# Patient Record
Sex: MALE | Race: BLACK OR AFRICAN AMERICAN | NOT HISPANIC OR LATINO | Employment: OTHER | URBAN - METROPOLITAN AREA
[De-identification: names, ages, dates, MRNs, and addresses within clinical notes are randomized per-mention and may not be internally consistent; named-entity substitution may affect disease eponyms.]

---

## 2019-07-26 ENCOUNTER — HOSPITAL ENCOUNTER (INPATIENT)
Facility: HOSPITAL | Age: 54
LOS: 5 days | Discharge: HOME | DRG: 392 | End: 2019-07-31
Attending: EMERGENCY MEDICINE | Admitting: EMERGENCY MEDICINE

## 2019-07-26 DIAGNOSIS — R60.0 BILATERAL LOWER EXTREMITY EDEMA: ICD-10-CM

## 2019-07-26 DIAGNOSIS — I87.1 VENOUS OBSTRUCTION: ICD-10-CM

## 2019-07-26 DIAGNOSIS — R19.07 GENERALIZED ABDOMINAL MASS: Primary | ICD-10-CM

## 2019-07-26 DIAGNOSIS — R63.5 WEIGHT GAIN: ICD-10-CM

## 2019-07-26 DIAGNOSIS — M79.89 LOCALIZED SWELLING OF LOWER EXTREMITY: ICD-10-CM

## 2019-07-26 DIAGNOSIS — R07.9 CHEST PAIN: ICD-10-CM

## 2019-07-26 DIAGNOSIS — N50.89 SCROTAL EDEMA: ICD-10-CM

## 2019-07-26 DIAGNOSIS — N50.89 SCROTAL SWELLING: ICD-10-CM

## 2019-07-26 LAB
ABO + RH BLD: NORMAL
ALBUMIN SERPL BCP-MCNC: 1.9 G/DL (ref 3.5–5.2)
ALP SERPL-CCNC: 918 U/L (ref 55–135)
ALT SERPL W/O P-5'-P-CCNC: 159 U/L (ref 10–44)
ANION GAP SERPL CALC-SCNC: 9 MMOL/L (ref 8–16)
AST SERPL-CCNC: 180 U/L (ref 10–40)
BASOPHILS # BLD AUTO: 0.02 K/UL (ref 0–0.2)
BASOPHILS NFR BLD: 0.2 % (ref 0–1.9)
BILIRUB SERPL-MCNC: 2.9 MG/DL (ref 0.1–1)
BLD GP AB SCN CELLS X3 SERPL QL: NORMAL
BUN SERPL-MCNC: 9 MG/DL (ref 6–20)
CALCIUM SERPL-MCNC: 8.1 MG/DL (ref 8.7–10.5)
CHLORIDE SERPL-SCNC: 104 MMOL/L (ref 95–110)
CO2 SERPL-SCNC: 28 MMOL/L (ref 23–29)
CREAT SERPL-MCNC: 0.7 MG/DL (ref 0.5–1.4)
DIFFERENTIAL METHOD: ABNORMAL
EOSINOPHIL # BLD AUTO: 0.1 K/UL (ref 0–0.5)
EOSINOPHIL NFR BLD: 1.1 % (ref 0–8)
ERYTHROCYTE [DISTWIDTH] IN BLOOD BY AUTOMATED COUNT: 19.3 % (ref 11.5–14.5)
EST. GFR  (AFRICAN AMERICAN): >60 ML/MIN/1.73 M^2
EST. GFR  (NON AFRICAN AMERICAN): >60 ML/MIN/1.73 M^2
ESTIMATED AVG GLUCOSE: 94 MG/DL (ref 68–131)
GLUCOSE SERPL-MCNC: 77 MG/DL (ref 70–110)
HBA1C MFR BLD HPLC: 4.9 % (ref 4–5.6)
HCT VFR BLD AUTO: 33.6 % (ref 40–54)
HGB BLD-MCNC: 10.8 G/DL (ref 14–18)
IMM GRANULOCYTES # BLD AUTO: 0.03 K/UL (ref 0–0.04)
IMM GRANULOCYTES NFR BLD AUTO: 0.4 % (ref 0–0.5)
INR PPP: 1 (ref 0.8–1.2)
LIPASE SERPL-CCNC: 74 U/L (ref 4–60)
LYMPHOCYTES # BLD AUTO: 1.4 K/UL (ref 1–4.8)
LYMPHOCYTES NFR BLD: 17.1 % (ref 18–48)
MAGNESIUM SERPL-MCNC: 1.9 MG/DL (ref 1.6–2.6)
MCH RBC QN AUTO: 26.9 PG (ref 27–31)
MCHC RBC AUTO-ENTMCNC: 32.1 G/DL (ref 32–36)
MCV RBC AUTO: 84 FL (ref 82–98)
MONOCYTES # BLD AUTO: 1 K/UL (ref 0.3–1)
MONOCYTES NFR BLD: 11.7 % (ref 4–15)
NEUTROPHILS # BLD AUTO: 5.8 K/UL (ref 1.8–7.7)
NEUTROPHILS NFR BLD: 69.5 % (ref 38–73)
NRBC BLD-RTO: 0 /100 WBC
PLATELET # BLD AUTO: 523 K/UL (ref 150–350)
PMV BLD AUTO: 9.8 FL (ref 9.2–12.9)
POTASSIUM SERPL-SCNC: 3.3 MMOL/L (ref 3.5–5.1)
PROT SERPL-MCNC: 6.1 G/DL (ref 6–8.4)
PROTHROMBIN TIME: 10.6 SEC (ref 9–12.5)
RBC # BLD AUTO: 4.01 M/UL (ref 4.6–6.2)
SODIUM SERPL-SCNC: 141 MMOL/L (ref 136–145)
TROPONIN I SERPL DL<=0.01 NG/ML-MCNC: <0.006 NG/ML (ref 0–0.03)
WBC # BLD AUTO: 8.38 K/UL (ref 3.9–12.7)

## 2019-07-26 PROCEDURE — 83735 ASSAY OF MAGNESIUM: CPT

## 2019-07-26 PROCEDURE — 85610 PROTHROMBIN TIME: CPT

## 2019-07-26 PROCEDURE — 99285 EMERGENCY DEPT VISIT HI MDM: CPT | Mod: 25

## 2019-07-26 PROCEDURE — 99223 PR INITIAL HOSPITAL CARE,LEVL III: ICD-10-PCS | Mod: ,,, | Performed by: HOSPITALIST

## 2019-07-26 PROCEDURE — 83036 HEMOGLOBIN GLYCOSYLATED A1C: CPT

## 2019-07-26 PROCEDURE — 84484 ASSAY OF TROPONIN QUANT: CPT

## 2019-07-26 PROCEDURE — 80053 COMPREHEN METABOLIC PANEL: CPT

## 2019-07-26 PROCEDURE — 93010 ELECTROCARDIOGRAM REPORT: CPT | Mod: ,,, | Performed by: INTERNAL MEDICINE

## 2019-07-26 PROCEDURE — 11000001 HC ACUTE MED/SURG PRIVATE ROOM

## 2019-07-26 PROCEDURE — 99285 EMERGENCY DEPT VISIT HI MDM: CPT | Mod: ,,, | Performed by: EMERGENCY MEDICINE

## 2019-07-26 PROCEDURE — 25000003 PHARM REV CODE 250: Performed by: STUDENT IN AN ORGANIZED HEALTH CARE EDUCATION/TRAINING PROGRAM

## 2019-07-26 PROCEDURE — 86901 BLOOD TYPING SEROLOGIC RH(D): CPT

## 2019-07-26 PROCEDURE — 99223 1ST HOSP IP/OBS HIGH 75: CPT | Mod: ,,, | Performed by: HOSPITALIST

## 2019-07-26 PROCEDURE — 93005 ELECTROCARDIOGRAM TRACING: CPT

## 2019-07-26 PROCEDURE — 99285 PR EMERGENCY DEPT VISIT,LEVEL V: ICD-10-PCS | Mod: ,,, | Performed by: EMERGENCY MEDICINE

## 2019-07-26 PROCEDURE — 85025 COMPLETE CBC W/AUTO DIFF WBC: CPT

## 2019-07-26 PROCEDURE — 93010 EKG 12-LEAD: ICD-10-PCS | Mod: ,,, | Performed by: INTERNAL MEDICINE

## 2019-07-26 PROCEDURE — 80074 ACUTE HEPATITIS PANEL: CPT

## 2019-07-26 PROCEDURE — 83690 ASSAY OF LIPASE: CPT

## 2019-07-26 PROCEDURE — 94761 N-INVAS EAR/PLS OXIMETRY MLT: CPT

## 2019-07-26 RX ORDER — IBUPROFEN 200 MG
16 TABLET ORAL
Status: DISCONTINUED | OUTPATIENT
Start: 2019-07-26 | End: 2019-07-31 | Stop reason: HOSPADM

## 2019-07-26 RX ORDER — GLUCAGON 1 MG
1 KIT INJECTION
Status: DISCONTINUED | OUTPATIENT
Start: 2019-07-26 | End: 2019-07-31 | Stop reason: HOSPADM

## 2019-07-26 RX ORDER — IBUPROFEN 200 MG
24 TABLET ORAL
Status: DISCONTINUED | OUTPATIENT
Start: 2019-07-26 | End: 2019-07-31 | Stop reason: HOSPADM

## 2019-07-26 RX ORDER — POLYETHYLENE GLYCOL 3350 17 G/17G
17 POWDER, FOR SOLUTION ORAL 2 TIMES DAILY PRN
Status: DISCONTINUED | OUTPATIENT
Start: 2019-07-26 | End: 2019-07-31 | Stop reason: HOSPADM

## 2019-07-26 RX ORDER — POTASSIUM CHLORIDE 20 MEQ/1
40 TABLET, EXTENDED RELEASE ORAL ONCE
Status: COMPLETED | OUTPATIENT
Start: 2019-07-26 | End: 2019-07-26

## 2019-07-26 RX ORDER — SODIUM CHLORIDE 0.9 % (FLUSH) 0.9 %
10 SYRINGE (ML) INJECTION
Status: DISCONTINUED | OUTPATIENT
Start: 2019-07-26 | End: 2019-07-30

## 2019-07-26 RX ORDER — ACETAMINOPHEN 325 MG/1
650 TABLET ORAL EVERY 4 HOURS PRN
Status: DISCONTINUED | OUTPATIENT
Start: 2019-07-26 | End: 2019-07-31 | Stop reason: HOSPADM

## 2019-07-26 RX ORDER — ACETAMINOPHEN 500 MG
1000 TABLET ORAL EVERY 8 HOURS PRN
Status: DISCONTINUED | OUTPATIENT
Start: 2019-07-26 | End: 2019-07-31 | Stop reason: HOSPADM

## 2019-07-26 RX ORDER — PROMETHAZINE HYDROCHLORIDE 25 MG/1
25 TABLET ORAL EVERY 6 HOURS PRN
Status: DISCONTINUED | OUTPATIENT
Start: 2019-07-26 | End: 2019-07-31 | Stop reason: HOSPADM

## 2019-07-26 RX ADMIN — POTASSIUM CHLORIDE 40 MEQ: 20 TABLET, EXTENDED RELEASE ORAL at 08:07

## 2019-07-26 NOTE — ED PROVIDER NOTES
Encounter Date: 7/26/2019       History     Chief Complaint   Patient presents with    Abnormal Ct Scan    Groin Swelling     size of grapefruit     This is a 52 y/o male who presents to ED with abdominal distension. Patient does not provide a good history as to how long this has been going on, but it sounds like at least several years. He presented to [facility] ED on 7/15/19 and underwent CT scan without contrast showing 26 x 25 x 20 cm intraabdominal cyst of unclear origin. He was given outpatient follow-up with GENERAL SURGERY, but has not seen them yet. Family was concerned because he has been having difficulty ambulating today due to his enlarged scrotum so they brought him to ED. His only complaint is chest discomfort that is worse when laying flat and improved when sitting up. He has significant leg edema - patient and family are unsure how long this has been going on. He denies N/V, abdominal pain. Having normal BMs - no hematochezia/melena. No scleral icterus/jaundice. Was having some urinary incontinence today - no hematuria/dysuria.    Denies any medical problems or previous surgeries. Does not regularly see a PCP.    He works as a  and has been able to work without issue. No smoking, quit ETOH 6 years ago.        Review of patient's allergies indicates:  No Known Allergies  No past medical history on file.  No past surgical history on file.  No family history on file.  Social History     Tobacco Use    Smoking status: Never Smoker    Smokeless tobacco: Never Used   Substance Use Topics    Alcohol use: Not Currently     Comment: quick drinking 6 years ago    Drug use: Never     Review of Systems   Constitutional: Positive for unexpected weight change (weight gain - unsure amt). Negative for chills, fatigue and fever.   HENT: Negative for trouble swallowing.    Respiratory: Negative for cough, shortness of breath and wheezing.    Cardiovascular: Positive for chest pain and leg swelling.  Negative for palpitations.   Gastrointestinal: Positive for abdominal distention. Negative for abdominal pain, constipation, diarrhea, nausea and vomiting.   Genitourinary: Negative for difficulty urinating, dysuria and hematuria.   Musculoskeletal: Negative for back pain and gait problem.   Skin: Negative for rash and wound.   Allergic/Immunologic: Negative for environmental allergies, food allergies and immunocompromised state.   Neurological: Negative for seizures and syncope.       Physical Exam     Initial Vitals [07/26/19 1640]   BP Pulse Resp Temp SpO2   110/70 95 18 98.5 °F (36.9 °C) 100 %      MAP       --         Physical Exam    Constitutional: He appears well-developed. No distress.   Appears thin   HENT:   Head: Normocephalic and atraumatic.   Mouth/Throat: Oropharynx is clear and moist.   Neck: No JVD present.   Cardiovascular: Normal rate and regular rhythm. Exam reveals no gallop and no friction rub.    No murmur heard.  Pulmonary/Chest: Breath sounds normal. No respiratory distress. He has no wheezes. He has no rhonchi. He has no rales.   Abdominal: Bowel sounds are normal. He exhibits distension and mass. There is no tenderness. There is no rebound.   Large firm mass occupying R abdomen, dull to percussion   Genitourinary:   Genitourinary Comments: Significant penile/scrotal edema with skin tearing   Musculoskeletal:   Significant bilateral lower extremity edema - entire legs with some skin tearing  Palpable B femoral pulses  Able to move toes   Skin: Skin is warm and dry.         ED Course   Procedures  Labs Reviewed   CBC W/ AUTO DIFFERENTIAL - Abnormal; Notable for the following components:       Result Value    RBC 4.01 (*)     Hemoglobin 10.8 (*)     Hematocrit 33.6 (*)     Mean Corpuscular Hemoglobin 26.9 (*)     RDW 19.3 (*)     Platelets 523 (*)     Lymph% 17.1 (*)     All other components within normal limits   COMPREHENSIVE METABOLIC PANEL - Abnormal; Notable for the following components:     Potassium 3.3 (*)     Calcium 8.1 (*)     Albumin 1.9 (*)     Total Bilirubin 2.9 (*)     Alkaline Phosphatase 918 (*)      (*)      (*)     All other components within normal limits   MAGNESIUM   PROTIME-INR   TROPONIN I   URINALYSIS, REFLEX TO URINE CULTURE   TYPE & SCREEN          Imaging Results          X-Ray Chest PA And Lateral (Final result)  Result time 07/26/19 17:51:24    Final result by Brandie Jackson MD (07/26/19 17:51:24)                 Impression:      Limited exam.  Lungs appear grossly clear.      Electronically signed by: Brandie Jackson MD  Date:    07/26/2019  Time:    17:51             Narrative:    EXAMINATION:  XR CHEST PA AND LATERAL    TECHNIQUE:  PA and lateral views of the chest were performed.    COMPARISON:  Prior dated 07/15/2019    FINDINGS:  The patient is rotated and partially recumbent, limiting evaluation.  The cardiac silhouette is not enlarged.  There is no evidence of acute pulmonary disease, pleural disease, lymph node enlargement, or cardiac decompensation.  No osseous abnormalities are identified.                                 Medical Decision Making:   History:   Old Medical Records: I decided to obtain old medical records.  Old Records Summarized: records from another hospital.  Initial Assessment:   54 y/o male with no known medical problems who presents to ED with several years of worsening abdominal distension and more recently leg/scrotal edema. Seen at [facility] ED 7/15/19 and had CT scan without contrast showing 26 x 25 x 20 cm intraabdominal cyst of unclear origin. Instructed to follow-up with GENERAL SURGERY. Brought in today for difficulty ambulating 2/2 scrotal edema. Some chest discomfort likely 2/2 pressure from abdominal mass.    On exam, vitals normal. Abdomen massively distended without TTP. Significant scrotal/penile and BLE edema with skin tearing.    Will check labs (CBC, CMP, PT/INR). EKG/troponin to rule out ACS.  Independently  Interpreted Test(s):   I have ordered and independently interpreted EKG Reading(s) - see prior notes       <> Summary of EKG Reading(s): NSR, no ST elevation  Clinical Tests:   Lab Tests: Ordered and Reviewed  The following lab test(s) were unremarkable: CBC, CMP, Troponin and PT  Radiological Study: Ordered and Reviewed  ED Management:  6:50 PM  Patient with large intraabdominal simple cyst of unclear origin (possibly hepatic) with resultant biliary and caval compression. Discussed with GENERAL SURGERY - no acute plans for surgical intervention. Will admit to MEDICINE for possible IR drainage of cyst.  Other:   I have discussed this case with another health care provider.                      Clinical Impression:       ICD-10-CM ICD-9-CM   1. Generalized abdominal mass R19.07 789.37   2. Chest pain R07.9 786.50   3. Localized swelling of lower extremity M79.89 729.81   4. Scrotal swelling N50.89 608.86                                Pratik Ennis MD  Resident  07/26/19 1852       Pratik Ennis MD  Resident  07/26/19 2713

## 2019-07-26 NOTE — ED NOTES
LOC: The patient is awake, alert and aware of environment with an appropriate affect, the patient is oriented x 3 and speaking appropriately.  SKIN: The skin is warm and dry, color consistent with ethnicity with jaundice noted to scelra of eyes  MUSCULOSKELETAL: Patient moving all extremities spontaneously,moderate swelling noted to bilateral lower extremities  RESPIRATORY: Airway is open and patent, respirations are spontaneous, patient has a normal effort and rate, no accessory muscle use noted  ABDOMEN: hard and tender to palpation, moderate distention noted, and moderate swelling noted to groin and penis area  NEUROLOGIC:  facial expression is symmetrical, patient moving all extremities spontaneously, normal sensation in all extremities when touched with a finger.  Follows all commands appropriately.    Patient here for eval after noting that patient has a large mass in his abd, patient states he has noticed over the years that his abd has been growing, patient stopped drinking alcohol about 6 years ago, patient has moderate swelling to bilateral lower extremities, patient is jaundice in the scelra of his eyes, patients has severe swelling to groin and penis, patient has also beginning to urinate on self over the last two days, patient complains of sob when laying flat, patient states he has to lay on his side to sleep he is unable to lay due to pressure in his chest area, vss, cardiac monitoring in progress, will continue to monitor

## 2019-07-26 NOTE — EKG INTERPRETATIONS - EMERGENCY DEPT.
Independently interpreted by MD:  Rate 94, NSR, no stemi, no ectopy, flat T waves in V5-6, long QT

## 2019-07-27 LAB
ALBUMIN SERPL BCP-MCNC: 1.7 G/DL (ref 3.5–5.2)
ALP SERPL-CCNC: 863 U/L (ref 55–135)
ALT SERPL W/O P-5'-P-CCNC: 144 U/L (ref 10–44)
ANION GAP SERPL CALC-SCNC: 5 MMOL/L (ref 8–16)
AST SERPL-CCNC: 156 U/L (ref 10–40)
BASOPHILS # BLD AUTO: 0.04 K/UL (ref 0–0.2)
BASOPHILS NFR BLD: 0.4 % (ref 0–1.9)
BILIRUB SERPL-MCNC: 3.2 MG/DL (ref 0.1–1)
BILIRUB UR QL STRIP: ABNORMAL
BUN SERPL-MCNC: 10 MG/DL (ref 6–20)
CALCIUM SERPL-MCNC: 8.2 MG/DL (ref 8.7–10.5)
CANCER AG125 SERPL-ACNC: 57 U/ML (ref 0–30)
CANCER AG19-9 SERPL-ACNC: <2 U/ML (ref 2–40)
CEA SERPL-MCNC: 6.5 NG/ML (ref 0–5)
CHLORIDE SERPL-SCNC: 103 MMOL/L (ref 95–110)
CLARITY UR REFRACT.AUTO: CLEAR
CO2 SERPL-SCNC: 27 MMOL/L (ref 23–29)
COLOR UR AUTO: ABNORMAL
CREAT SERPL-MCNC: 0.6 MG/DL (ref 0.5–1.4)
DIFFERENTIAL METHOD: ABNORMAL
EOSINOPHIL # BLD AUTO: 0.1 K/UL (ref 0–0.5)
EOSINOPHIL NFR BLD: 1.2 % (ref 0–8)
ERYTHROCYTE [DISTWIDTH] IN BLOOD BY AUTOMATED COUNT: 19.7 % (ref 11.5–14.5)
EST. GFR  (AFRICAN AMERICAN): >60 ML/MIN/1.73 M^2
EST. GFR  (NON AFRICAN AMERICAN): >60 ML/MIN/1.73 M^2
GLUCOSE SERPL-MCNC: 97 MG/DL (ref 70–110)
GLUCOSE UR QL STRIP: NEGATIVE
HCT VFR BLD AUTO: 32 % (ref 40–54)
HGB BLD-MCNC: 10.4 G/DL (ref 14–18)
HGB UR QL STRIP: NEGATIVE
IMM GRANULOCYTES # BLD AUTO: 0.04 K/UL (ref 0–0.04)
IMM GRANULOCYTES NFR BLD AUTO: 0.4 % (ref 0–0.5)
KETONES UR QL STRIP: NEGATIVE
LEUKOCYTE ESTERASE UR QL STRIP: NEGATIVE
LYMPHOCYTES # BLD AUTO: 1.6 K/UL (ref 1–4.8)
LYMPHOCYTES NFR BLD: 16.9 % (ref 18–48)
MAGNESIUM SERPL-MCNC: 1.8 MG/DL (ref 1.6–2.6)
MCH RBC QN AUTO: 26.7 PG (ref 27–31)
MCHC RBC AUTO-ENTMCNC: 32.5 G/DL (ref 32–36)
MCV RBC AUTO: 82 FL (ref 82–98)
MONOCYTES # BLD AUTO: 0.9 K/UL (ref 0.3–1)
MONOCYTES NFR BLD: 9.4 % (ref 4–15)
NEUTROPHILS # BLD AUTO: 6.8 K/UL (ref 1.8–7.7)
NEUTROPHILS NFR BLD: 71.7 % (ref 38–73)
NITRITE UR QL STRIP: NEGATIVE
NRBC BLD-RTO: 0 /100 WBC
PH UR STRIP: 6 [PH] (ref 5–8)
PHOSPHATE SERPL-MCNC: 2.4 MG/DL (ref 2.7–4.5)
PLATELET # BLD AUTO: 477 K/UL (ref 150–350)
PMV BLD AUTO: 10.2 FL (ref 9.2–12.9)
POTASSIUM SERPL-SCNC: 4 MMOL/L (ref 3.5–5.1)
PROT SERPL-MCNC: 5.8 G/DL (ref 6–8.4)
PROT UR QL STRIP: NEGATIVE
RBC # BLD AUTO: 3.9 M/UL (ref 4.6–6.2)
SODIUM SERPL-SCNC: 135 MMOL/L (ref 136–145)
SP GR UR STRIP: >=1.03 (ref 1–1.03)
URN SPEC COLLECT METH UR: ABNORMAL
WBC # BLD AUTO: 9.43 K/UL (ref 3.9–12.7)

## 2019-07-27 PROCEDURE — 25000003 PHARM REV CODE 250: Performed by: STUDENT IN AN ORGANIZED HEALTH CARE EDUCATION/TRAINING PROGRAM

## 2019-07-27 PROCEDURE — 86301 IMMUNOASSAY TUMOR CA 19-9: CPT

## 2019-07-27 PROCEDURE — 83735 ASSAY OF MAGNESIUM: CPT

## 2019-07-27 PROCEDURE — 81003 URINALYSIS AUTO W/O SCOPE: CPT

## 2019-07-27 PROCEDURE — 99233 PR SUBSEQUENT HOSPITAL CARE,LEVL III: ICD-10-PCS | Mod: ,,, | Performed by: HOSPITALIST

## 2019-07-27 PROCEDURE — 85025 COMPLETE CBC W/AUTO DIFF WBC: CPT

## 2019-07-27 PROCEDURE — 86304 IMMUNOASSAY TUMOR CA 125: CPT

## 2019-07-27 PROCEDURE — 11000001 HC ACUTE MED/SURG PRIVATE ROOM

## 2019-07-27 PROCEDURE — 99233 SBSQ HOSP IP/OBS HIGH 50: CPT | Mod: ,,, | Performed by: HOSPITALIST

## 2019-07-27 PROCEDURE — 80053 COMPREHEN METABOLIC PANEL: CPT

## 2019-07-27 PROCEDURE — 82378 CARCINOEMBRYONIC ANTIGEN: CPT

## 2019-07-27 PROCEDURE — 86682 HELMINTH ANTIBODY: CPT

## 2019-07-27 PROCEDURE — 36415 COLL VENOUS BLD VENIPUNCTURE: CPT

## 2019-07-27 PROCEDURE — 84100 ASSAY OF PHOSPHORUS: CPT

## 2019-07-27 RX ORDER — SODIUM CHLORIDE 0.9 % (FLUSH) 0.9 %
10 SYRINGE (ML) INJECTION
Status: DISCONTINUED | OUTPATIENT
Start: 2019-07-27 | End: 2019-07-31 | Stop reason: HOSPADM

## 2019-07-27 RX ADMIN — ACETAMINOPHEN 1000 MG: 500 TABLET ORAL at 12:07

## 2019-07-27 NOTE — ASSESSMENT & PLAN NOTE
-CT ABD/Pelvis showing a large 26 x 25 x 20 cm simple appearing cystic mass which cannot be clearly defined as extrahepatic, compressing common bile duct in displacing all intra-abdominal organs without evidence of GI or  obstruction    -Consult to IR for drainage, med management per medicine team  -NPO Midnight  - and CEA  -Lipase  -Acute Hepatitis Panel  -stool analysis for parasites, ova, cysts  -stool WBC  -FOBT

## 2019-07-27 NOTE — SUBJECTIVE & OBJECTIVE
Review of Systems   Constitutional: Negative for chills and fever.   HENT: Negative for trouble swallowing.    Eyes: Negative for visual disturbance.   Respiratory: Negative for shortness of breath.    Cardiovascular: Positive for chest pain and leg swelling. Negative for palpitations.   Gastrointestinal: Positive for abdominal distention and abdominal pain. Negative for blood in stool, constipation, diarrhea, nausea and vomiting.   Genitourinary: Positive for penile swelling and scrotal swelling.        Urinary incontinence   Musculoskeletal: Negative for back pain.        Bilateral LE swelling   Skin: Negative for rash.   Neurological: Negative for dizziness and light-headedness.   Psychiatric/Behavioral: Negative for confusion.     Objective:     Vital Signs (Most Recent):  Temp: 97.8 °F (36.6 °C) (07/27/19 1119)  Pulse: 62 (07/27/19 0808)  Resp: 13 (07/27/19 0808)  BP: 114/73 (07/27/19 0808)  SpO2: 95 % (07/27/19 0808) Vital Signs (24h Range):  Temp:  [97.4 °F (36.3 °C)-98.6 °F (37 °C)] 97.8 °F (36.6 °C)  Pulse:  [62-95] 62  Resp:  [13-21] 13  SpO2:  [94 %-100 %] 95 %  BP: (106-153)/(70-89) 114/73     Weight: 79.8 kg (175 lb 14.8 oz)  Body mass index is 29.28 kg/m².    Physical Exam   Constitutional: He is oriented to person, place, and time. He appears well-developed and well-nourished. No distress.   HENT:   Head: Normocephalic and atraumatic.   Eyes: EOM are normal. No scleral icterus.   Neck: Normal range of motion. No JVD present.   Cardiovascular: Normal rate and normal heart sounds.   Pulmonary/Chest: Effort normal and breath sounds normal. No respiratory distress.   Abdominal: Bowel sounds are normal. He exhibits distension and mass. There is tenderness. There is no rebound and no guarding.   Enlarged and distended abdomen.    Genitourinary:   Genitourinary Comments: Enlarged scrotum more on the L side than R   Musculoskeletal: Normal range of motion. He exhibits edema.   Bilateral lower extremity edema     Neurological: He is alert and oriented to person, place, and time.   Psychiatric: His behavior is normal.         CRANIAL NERVES     CN III, IV, VI   Extraocular motions are normal.        Significant Labs: All pertinent labs within the past 24 hours have been reviewed.    Significant Imaging: I have reviewed and interpreted all pertinent imaging results/findings within the past 24 hours.

## 2019-07-27 NOTE — ASSESSMENT & PLAN NOTE
-increased scrotal swelling over the last 2 weeks  -endorses urinary incontinence over this period as well  -UA pending

## 2019-07-27 NOTE — ASSESSMENT & PLAN NOTE
-CT ABD/Pelvis showing a large 26 x 25 x 20 cm simple appearing cystic mass which cannot be clearly defined as extrahepatic, compressing common bile duct in displacing all intra-abdominal organs without evidence of GI or  obstruction    -Consult to IR for drainage, med management per medicine team  -, CEA, and Lipase mildy elevated at 57, 6.5, and 74 respectively  -Acute Hepatitis Panel pending  -stool analysis for parasites, ova, cysts pending  -stool WBC pending  -FOBT pending  -Gen Surg consulted on 07/26 and recommended drainage by IR  -IR to drain on Monday, 07/29  - ordered

## 2019-07-27 NOTE — ASSESSMENT & PLAN NOTE
-weight gain over the last few years, unsure time-course or amount gained  -CT ABD with large addominal cystic mass

## 2019-07-27 NOTE — ASSESSMENT & PLAN NOTE
-progressive history of bilateral lower extremity swelling over the last few years with skin tearing present on exam  -likely 2/2 IVC compression via abdominal cystic mass

## 2019-07-27 NOTE — ASSESSMENT & PLAN NOTE
-progressive history of bilateral lower extremity swelling over the last few years with skin tearing present on exam  -likely 2/2 IVC compression via abdominal cystic mass  -IR to drain abdominal mass on Monday 07/29

## 2019-07-27 NOTE — H&P
[facility] Hospital Medicine  History & Physical    Patient Name: Kishan Sánchez  MRN: [id number]  Admission Date: 7/26/2019  Attending Physician: Janie Hampton MD   Primary Care Provider: Primary Doctor [facility] Medicine Team: [facility] 2 Hipolito Velazquez MD     Patient information was obtained from patient, relative(s) and ER records.     Subjective:     Principal Problem:Generalized abdominal mass    Chief Complaint:   Chief Complaint   Patient presents with    Abnormal Ct Scan    Groin Swelling     size of grapefruit        HPI: Patient is a 54 yo M with no PMH, has not seen a doctor in his life, presents with an increasing abdomen and scrotum. It is unknown exactly when this all started as the patient is a poor historian (most of the history was obtained via his sister and niece), however, it seems like this has been ongoing for the last few years. He has had progressive distension of his abdomen for the last few years, without any pain. Two weeks ago he started to have some chest pain so he decided to go to [facility], from which he was discharged with a follow up CT Abd appointment. Today he went to his CT Abd appointment, and when he got home, his niece noticed that he had an enlarged scrotum while he was using the bathroom, so insisted that he come to [facility] ED in [place]. He also has had urinary incontinence starting 2 weeks ago which has progressively gotten worse, now wearing diapers. Also, over these past few years of progressive abdominal distension, he has had progressive leg swelling bilaterally. He endorses weight gain but does not know how much. He denies fevers, chills, chest pain, SOB. He denies any changes in his bowel movements and states that his last one was today which was normal. He denies N/V.    Denies ever seeing a doctor. Denies any PMH and PSH.    He works as a  and lives with his sister. Denies tobacco and IV drug use as well as  drugs not prescribed to him. He denies any history of blood transfusions. Endorses remote history of drinking 2 beers daily, but stopped in 1989. His mother passed away from ovarian cancer and his father from lung cancer. Otherwise, family history is unknown.    CT ABD/Pelvis showing a large 26 x 25 x 20 cm simple appearing cystic mass which cannot be clearly defined as extrahepatic, compressing common bile duct in displacing all intra-abdominal organs without evidence of GI or  obstruction.    In the ED, VSS, WBC 8.4, Alk P 918, , , troponin 0.006.     No past medical history on file.    No past surgical history on file.    Review of patient's allergies indicates:  No Known Allergies    Current Facility-Administered Medications on File Prior to Encounter   Medication    [COMPLETED] iohexol (OMNIPAQUE 350) injection 80 mL    iohexol (OMNIPAQUE) oral solution 30 mL     No current outpatient medications on file prior to encounter.     Family History     None        Tobacco Use    Smoking status: Never Smoker    Smokeless tobacco: Never Used   Substance and Sexual Activity    Alcohol use: Not Currently     Comment: quick drinking 6 years ago    Drug use: Never    Sexual activity: Not Currently     Partners: Female     Review of Systems   Constitutional: Negative for chills and fever.   HENT: Negative for trouble swallowing.    Eyes: Negative for visual disturbance.   Respiratory: Negative for shortness of breath.    Cardiovascular: Positive for chest pain and leg swelling. Negative for palpitations.   Gastrointestinal: Positive for abdominal distention and abdominal pain. Negative for blood in stool, constipation, diarrhea, nausea and vomiting.   Genitourinary: Positive for penile swelling and scrotal swelling.        Urinary incontinence   Musculoskeletal: Negative for back pain.        Bilateral LE swelling   Skin: Negative for rash.   Neurological: Negative for dizziness and light-headedness.    Psychiatric/Behavioral: Negative for confusion.     Objective:     Vital Signs (Most Recent):  Temp: 98.5 °F (36.9 °C) (07/26/19 1640)  Pulse: 79 (07/26/19 1846)  Resp: 18 (07/26/19 1846)  BP: 111/79 (07/26/19 1846)  SpO2: 100 % (07/26/19 1846) Vital Signs (24h Range):  Temp:  [98.5 °F (36.9 °C)] 98.5 °F (36.9 °C)  Pulse:  [79-95] 79  Resp:  [18-20] 18  SpO2:  [100 %] 100 %  BP: (106-131)/(70-89) 111/79     Weight: 79.8 kg (176 lb)  Body mass index is 29.29 kg/m².    Physical Exam   Constitutional: He is oriented to person, place, and time. He appears well-developed and well-nourished. No distress.   HENT:   Head: Normocephalic and atraumatic.   Eyes: EOM are normal. No scleral icterus.   Neck: Normal range of motion. No JVD present.   Cardiovascular: Normal rate and normal heart sounds.   Pulmonary/Chest: Effort normal and breath sounds normal. No respiratory distress.   Abdominal: Bowel sounds are normal. He exhibits distension and mass. There is tenderness. There is no rebound and no guarding.   Enlarged and distended abdomen.    Genitourinary:   Genitourinary Comments: Enlarged scrotum more on the L side than R   Musculoskeletal: Normal range of motion. He exhibits edema.   Bilateral lower extremity edema    Neurological: He is alert and oriented to person, place, and time.   Psychiatric: His behavior is normal.         CRANIAL NERVES     CN III, IV, VI   Extraocular motions are normal.        Significant Labs: All pertinent labs within the past 24 hours have been reviewed.    Significant Imaging: I have reviewed and interpreted all pertinent imaging results/findings within the past 24 hours.    Assessment/Plan:     * Generalized abdominal mass  -CT ABD/Pelvis showing a large 26 x 25 x 20 cm simple appearing cystic mass which cannot be clearly defined as extrahepatic, compressing common bile duct in displacing all intra-abdominal organs without evidence of GI or  obstruction    -Consult to IR for drainage,  med management per medicine team  -NPO Midnight  - and CEA  -Lipase  -Acute Hepatitis Panel  -stool analysis for parasites, ova, cysts  -stool WBC  -FOBT          Scrotal edema  -increased scrotal swelling over the last 2 weeks  -endorses urinary incontinence over this period as well  -UA pending      Bilateral lower extremity edema  -progressive history of bilateral lower extremity swelling over the last few years with skin tearing present on exam  -likely 2/2 IVC compression via abdominal cystic mass      Weight gain  -weight gain over the last few years, unsure time-course or amount gained  -CT ABD with large addominal cystic mass        VTE Risk Mitigation (From admission, onward)        Ordered     Place sequential compression device  Until discontinued      07/26/19 1946     IP VTE LOW RISK PATIENT  Once      07/26/19 1946             Hipolito Velazquez MD  Department of Hospital Medicine   [facility]

## 2019-07-27 NOTE — CONSULTS
Radiology Consult    Kishan Sánchez is a 53 y.o. male with a large cystic fluid collection within the liver, for which IR is consulted to drain.     History reviewed. No pertinent past medical history.  History reviewed. No pertinent surgical history.    Discussed with primary team, Dr. Hampton     Imaging reviewed with Radiology staff, Dr. Guy.     Procedure: Hepatic Fluid Collection Drainage    Scheduled Meds:   Continuous Infusions:   PRN Meds:acetaminophen, acetaminophen, Dextrose 10% Bolus, Dextrose 10% Bolus, glucagon (human recombinant), glucose, glucose, polyethylene glycol, promethazine, sodium chloride 0.9%, sodium chloride 0.9%    Allergies: Review of patient's allergies indicates:  No Known Allergies    Labs:  Recent Labs   Lab 07/26/19  1719   INR 1.0       Recent Labs   Lab 07/27/19  0432   WBC 9.43   HGB 10.4*   HCT 32.0*   MCV 82   *      Recent Labs   Lab 07/27/19  0432   GLU 97   *   K 4.0      CO2 27   BUN 10   CREATININE 0.6   CALCIUM 8.2*   MG 1.8   *   *   ALBUMIN 1.7*   BILITOT 3.2*         Vitals (Most Recent):  Temp: 97.7 °F (36.5 °C) (07/27/19 1454)  Pulse: 62 (07/27/19 0808)  Resp: 13 (07/27/19 0808)  BP: 114/73 (07/27/19 0808)  SpO2: 95 % (07/27/19 0808)    Plan:   1. Recommend obtaining an MRCP for further characterization and rule out other etiologies.     2. Recommend hepatology consult.     3.  Will plan for possible intervention on Monday pending the above findings/recommendations.  Patient would need to be NPO Sunday night and have anticoagulation held the night before.     Please call with any questions.       Meghann Alfaro MD   Department of Radiology  PGY III Resident  Pager: [phone]

## 2019-07-27 NOTE — HPI
Patient is a 54 yo M with no PMH, has not seen a doctor in his life, presents with an increasing abdomen and scrotum. It is unknown exactly when this all started as the patient is a poor historian (most of the history was obtained via his sister and niece), however, it seems like this has been ongoing for the last few years. He has had progressive distension of his abdomen for the last few years, without any pain. Two weeks ago he started to have some chest pain so he decided to go to [facility], from which he was discharged with a follow up CT Abd appointment. Today he went to his CT Abd appointment, and when he got home, his niece noticed that he had an enlarged scrotum while he was using the bathroom, so insisted that he come to [facility] ED in [place]. He also has had urinary incontinence starting 2 weeks ago which has progressively gotten worse, now wearing diapers. Also, over these past few years of progressive abdominal distension, he has had progressive leg swelling bilaterally. He endorses weight gain but does not know how much. He denies fevers, chills, chest pain, SOB. He denies any changes in his bowel movements and states that his last one was today which was normal. He denies N/V.    Denies ever seeing a doctor. Denies any PMH and PSH.    He works as a  and lives with his sister. Denies tobacco and IV drug use as well as drugs not prescribed to him. He denies any history of blood transfusions. Endorses remote history of drinking 2 beers daily, but stopped in 1989. His mother passed away from ovarian cancer and his father from lung cancer. Otherwise, family history is unknown.    CT ABD/Pelvis showing a large 26 x 25 x 20 cm simple appearing cystic mass which cannot be clearly defined as extrahepatic, compressing common bile duct in displacing all intra-abdominal organs without evidence of GI or  obstruction.    In the ED, VSS, WBC 8.4, Alk P 918, , , troponin 0.006.

## 2019-07-27 NOTE — ASSESSMENT & PLAN NOTE
-weight gain over the last few years, unsure time-course or amount gained  -CT ABD with large abdominal cystic mass  -IR to drain on Monday 07/29

## 2019-07-27 NOTE — PROGRESS NOTES
[facility] Hospital Medicine  Progress Note    Patient Name: Kishan Sánchez  MRN: [id number]  Patient Class: IP- Inpatient   Admission Date: 7/26/2019  Length of Stay: 1 days  Attending Physician: Janie Hampton MD  Primary Care Provider: Primary [facility] Medicine Team: [facility] HOSP MED 2 Hipolito Velazquez MD    Subjective:     Principal Problem:Generalized abdominal mass      HPI:  Patient is a 54 yo M with no PMH, has not seen a doctor in his life, presents with an increasing abdomen and scrotum. It is unknown exactly when this all started as the patient is a poor historian (most of the history was obtained via his sister and niece), however, it seems like this has been ongoing for the last few years. He has had progressive distension of his abdomen for the last few years, without any pain. Two weeks ago he started to have some chest pain so he decided to go to [facility], from which he was discharged with a follow up CT Abd appointment. Today he went to his CT Abd appointment, and when he got home, his niece noticed that he had an enlarged scrotum while he was using the bathroom, so insisted that he come to [facility] in [place]. He also has had urinary incontinence starting 2 weeks ago which has progressively gotten worse, now wearing diapers. Also, over these past few years of progressive abdominal distension, he has had progressive leg swelling bilaterally. He endorses weight gain but does not know how much. He denies fevers, chills, chest pain, SOB. He denies any changes in his bowel movements and states that his last one was today which was normal. He denies N/V.    Denies ever seeing a doctor. Denies any PMH and PSH.    He works as a  and lives with his sister. Denies tobacco and IV drug use as well as drugs not prescribed to him. He denies any history of blood transfusions. Endorses remote history of drinking 2 beers daily, but stopped in 1989. His mother  passed away from ovarian cancer and his father from lung cancer. Otherwise, family history is unknown.    CT ABD/Pelvis showing a large 26 x 25 x 20 cm simple appearing cystic mass which cannot be clearly defined as extrahepatic, compressing common bile duct in displacing all intra-abdominal organs without evidence of GI or  obstruction.    In the ED, VSS, WBC 8.4, Alk P 918, , , troponin 0.006.     Overview/Hospital Course:  Patient was admitted to hospital medicine on 07/26 for abdominal distension and pain 2/2 cystic abdominal mass. A CEA was ordered and came back elevated at 6.5 and a  was ordered and was elevated at 57. Lipase was ordered and was mildly elevated at 74. UA showed 2+ bilirubin but was otherwise normal and non-concerning for UTI. On 07/27, General Surgery was consulted and recommended that IR place a drain. IR was consulted who scheduled him for drainage on Monday, 07/29. A Ca 19-9 was ordered. He was given tylenol 1g PRN for pain and placed on a regular diet.    Interval History:  NAEO. Complaints of mild abdominal pain. Denies bowel/bladder issues. Denies fever/chills/N/V.    Review of Systems   Constitutional: Negative for chills and fever.   HENT: Negative for trouble swallowing.    Eyes: Negative for visual disturbance.   Respiratory: Negative for shortness of breath.    Cardiovascular: Positive for chest pain and leg swelling. Negative for palpitations.   Gastrointestinal: Positive for abdominal distention and abdominal pain. Negative for blood in stool, constipation, diarrhea, nausea and vomiting.   Genitourinary: Positive for penile swelling and scrotal swelling.        Urinary incontinence   Musculoskeletal: Negative for back pain.        Bilateral LE swelling   Skin: Negative for rash.   Neurological: Negative for dizziness and light-headedness.   Psychiatric/Behavioral: Negative for confusion.     Objective:     Vital Signs (Most Recent):  Temp: 97.8 °F (36.6 °C)  (07/27/19 1119)  Pulse: 62 (07/27/19 0808)  Resp: 13 (07/27/19 0808)  BP: 114/73 (07/27/19 0808)  SpO2: 95 % (07/27/19 0808) Vital Signs (24h Range):  Temp:  [97.4 °F (36.3 °C)-98.6 °F (37 °C)] 97.8 °F (36.6 °C)  Pulse:  [62-95] 62  Resp:  [13-21] 13  SpO2:  [94 %-100 %] 95 %  BP: (106-153)/(70-89) 114/73     Weight: 79.8 kg (175 lb 14.8 oz)  Body mass index is 29.28 kg/m².    Physical Exam   Constitutional: He is oriented to person, place, and time. He appears well-developed and well-nourished. No distress.   HENT:   Head: Normocephalic and atraumatic.   Eyes: EOM are normal. No scleral icterus.   Neck: Normal range of motion. No JVD present.   Cardiovascular: Normal rate and normal heart sounds.   Pulmonary/Chest: Effort normal and breath sounds normal. No respiratory distress.   Abdominal: Bowel sounds are normal. He exhibits distension and mass. There is tenderness. There is no rebound and no guarding.   Enlarged and distended abdomen.    Genitourinary:   Genitourinary Comments: Enlarged scrotum more on the L side than R   Musculoskeletal: Normal range of motion. He exhibits edema.   Bilateral lower extremity edema    Neurological: He is alert and oriented to person, place, and time.   Psychiatric: His behavior is normal.         CRANIAL NERVES     CN III, IV, VI   Extraocular motions are normal.        Significant Labs: All pertinent labs within the past 24 hours have been reviewed.    Significant Imaging: I have reviewed and interpreted all pertinent imaging results/findings within the past 24 hours.      Assessment/Plan:      * Generalized abdominal mass  -CT ABD/Pelvis showing a large 26 x 25 x 20 cm simple appearing cystic mass which cannot be clearly defined as extrahepatic, compressing common bile duct in displacing all intra-abdominal organs without evidence of GI or  obstruction    -Consult to IR for drainage, med management per medicine team  -, CEA, and Lipase mildy elevated at 57, 6.5, and 74  respectively  -Acute Hepatitis Panel pending  -stool analysis for parasites, ova, cysts pending  -stool WBC pending  -FOBT pending  -Gen Surg consulted on 07/26 and recommended drainage by IR  -IR to drain on Monday, 07/29  - ordered          Scrotal edema  -increased scrotal swelling over the last 2 weeks  -endorses urinary incontinence over this period as well  -UA with 2+ bilirubin, otherwise normal  -likely 2/2 IVC compression by abdominal mass      Bilateral lower extremity edema  -progressive history of bilateral lower extremity swelling over the last few years with skin tearing present on exam  -likely 2/2 IVC compression via abdominal cystic mass  -IR to drain abdominal mass on Monday 07/29      Weight gain  -weight gain over the last few years, unsure time-course or amount gained  -CT ABD with large abdominal cystic mass  -IR to drain on Monday 07/29        VTE Risk Mitigation (From admission, onward)        Ordered     IP VTE LOW RISK PATIENT  Once      07/27/19 1012     Place sequential compression device  Until discontinued      07/26/19 1946                Hipolito Velazquez MD  Department of Hospital Medicine   [facility]

## 2019-07-27 NOTE — NURSING
Pt arrived to floor without report from emergency department. Visi applied. VSS. Will SOS. Will continue to monitor.
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
## 2019-07-27 NOTE — PHARMACY MED REC
"Admission Medication Reconciliation - Pharmacy Consult Note    The home medication history was taken by Traci Ricci, Pharmacy Tech.     You may go to "Admission" then "Reconcile Home Medications" tabs to review and/or act upon these items.      No issues noted with the medication reconciliation.      Yandy Sandra, PharmD, BCPS  [phone]                .    .          "

## 2019-07-27 NOTE — HOSPITAL COURSE
Patient was admitted to hospital medicine on 07/26 for abdominal distension and pain 2/2 cystic abdominal mass. A CEA was ordered and came back elevated at 6.5 and a  was ordered and was elevated at 57. Lipase was ordered and was mildly elevated at 74. UA showed 2+ bilirubin but was otherwise normal and non-concerning for UTI. On 07/27, General Surgery was consulted and recommended that IR place a drain urgently, due to concerns for LE edema. Neurovascular checks were scheduled q4 and IR was consulted who scheduled him for drainage on Monday, 07/29. A Ca 19-9 was ordered which resulted as being low. On 07/28, IR recommended a MRCP and hepatology consult. MRCP was revealing of cystic mass arising from the liver. Hepatology was consulted. He was started on a regular diet, as he was not complaining of any difficulties swallowing or any trouble eating/passing food.     IR drainage of abscess 7/29; 7L fluid removed. Aspirate studies pending.   7/30: Awaiting cytology; discussed with hepatology that he may follow up with results outpatient   7/31:  D/C with no home medications and F/U with hepatology

## 2019-07-28 LAB
AFP SERPL-MCNC: 2.7 NG/ML (ref 0–8.4)
ALBUMIN SERPL BCP-MCNC: 1.7 G/DL (ref 3.5–5.2)
ALP SERPL-CCNC: 918 U/L (ref 55–135)
ALT SERPL W/O P-5'-P-CCNC: 150 U/L (ref 10–44)
ANION GAP SERPL CALC-SCNC: 6 MMOL/L (ref 8–16)
AST SERPL-CCNC: 167 U/L (ref 10–40)
BASOPHILS # BLD AUTO: 0.04 K/UL (ref 0–0.2)
BASOPHILS NFR BLD: 0.5 % (ref 0–1.9)
BILIRUB SERPL-MCNC: 2.8 MG/DL (ref 0.1–1)
BUN SERPL-MCNC: 13 MG/DL (ref 6–20)
CALCIUM SERPL-MCNC: 8.1 MG/DL (ref 8.7–10.5)
CHLORIDE SERPL-SCNC: 104 MMOL/L (ref 95–110)
CO2 SERPL-SCNC: 28 MMOL/L (ref 23–29)
CREAT SERPL-MCNC: 0.6 MG/DL (ref 0.5–1.4)
DIFFERENTIAL METHOD: ABNORMAL
EOSINOPHIL # BLD AUTO: 0.1 K/UL (ref 0–0.5)
EOSINOPHIL NFR BLD: 1.1 % (ref 0–8)
ERYTHROCYTE [DISTWIDTH] IN BLOOD BY AUTOMATED COUNT: 19.6 % (ref 11.5–14.5)
EST. GFR  (AFRICAN AMERICAN): >60 ML/MIN/1.73 M^2
EST. GFR  (NON AFRICAN AMERICAN): >60 ML/MIN/1.73 M^2
GLUCOSE SERPL-MCNC: 91 MG/DL (ref 70–110)
HCT VFR BLD AUTO: 35.4 % (ref 40–54)
HGB BLD-MCNC: 11.3 G/DL (ref 14–18)
IMM GRANULOCYTES # BLD AUTO: 0.04 K/UL (ref 0–0.04)
IMM GRANULOCYTES NFR BLD AUTO: 0.5 % (ref 0–0.5)
LYMPHOCYTES # BLD AUTO: 1.1 K/UL (ref 1–4.8)
LYMPHOCYTES NFR BLD: 12.9 % (ref 18–48)
MAGNESIUM SERPL-MCNC: 1.8 MG/DL (ref 1.6–2.6)
MCH RBC QN AUTO: 26.2 PG (ref 27–31)
MCHC RBC AUTO-ENTMCNC: 31.9 G/DL (ref 32–36)
MCV RBC AUTO: 82 FL (ref 82–98)
MONOCYTES # BLD AUTO: 0.9 K/UL (ref 0.3–1)
MONOCYTES NFR BLD: 10 % (ref 4–15)
NEUTROPHILS # BLD AUTO: 6.5 K/UL (ref 1.8–7.7)
NEUTROPHILS NFR BLD: 75 % (ref 38–73)
NRBC BLD-RTO: 0 /100 WBC
PHOSPHATE SERPL-MCNC: 2.8 MG/DL (ref 2.7–4.5)
PLATELET # BLD AUTO: 482 K/UL (ref 150–350)
PMV BLD AUTO: 10.4 FL (ref 9.2–12.9)
POTASSIUM SERPL-SCNC: 4 MMOL/L (ref 3.5–5.1)
PROT SERPL-MCNC: 6.1 G/DL (ref 6–8.4)
RBC # BLD AUTO: 4.31 M/UL (ref 4.6–6.2)
SODIUM SERPL-SCNC: 138 MMOL/L (ref 136–145)
WBC # BLD AUTO: 8.71 K/UL (ref 3.9–12.7)

## 2019-07-28 PROCEDURE — 82105 ALPHA-FETOPROTEIN SERUM: CPT

## 2019-07-28 PROCEDURE — 80053 COMPREHEN METABOLIC PANEL: CPT

## 2019-07-28 PROCEDURE — 84100 ASSAY OF PHOSPHORUS: CPT

## 2019-07-28 PROCEDURE — 99232 PR SUBSEQUENT HOSPITAL CARE,LEVL II: ICD-10-PCS | Mod: ,,, | Performed by: HOSPITALIST

## 2019-07-28 PROCEDURE — 25500020 PHARM REV CODE 255: Performed by: HOSPITALIST

## 2019-07-28 PROCEDURE — 25000003 PHARM REV CODE 250: Performed by: STUDENT IN AN ORGANIZED HEALTH CARE EDUCATION/TRAINING PROGRAM

## 2019-07-28 PROCEDURE — 83735 ASSAY OF MAGNESIUM: CPT

## 2019-07-28 PROCEDURE — 11000001 HC ACUTE MED/SURG PRIVATE ROOM

## 2019-07-28 PROCEDURE — A9585 GADOBUTROL INJECTION: HCPCS | Performed by: HOSPITALIST

## 2019-07-28 PROCEDURE — 85025 COMPLETE CBC W/AUTO DIFF WBC: CPT

## 2019-07-28 PROCEDURE — 99232 SBSQ HOSP IP/OBS MODERATE 35: CPT | Mod: ,,, | Performed by: HOSPITALIST

## 2019-07-28 PROCEDURE — 36415 COLL VENOUS BLD VENIPUNCTURE: CPT

## 2019-07-28 RX ORDER — IBUPROFEN 200 MG
400 TABLET ORAL ONCE
Status: COMPLETED | OUTPATIENT
Start: 2019-07-28 | End: 2019-07-28

## 2019-07-28 RX ORDER — GADOBUTROL 604.72 MG/ML
10 INJECTION INTRAVENOUS
Status: COMPLETED | OUTPATIENT
Start: 2019-07-28 | End: 2019-07-28

## 2019-07-28 RX ADMIN — IBUPROFEN 400 MG: 200 TABLET, FILM COATED ORAL at 11:07

## 2019-07-28 RX ADMIN — ACETAMINOPHEN 1000 MG: 500 TABLET ORAL at 04:07

## 2019-07-28 RX ADMIN — GADOBUTROL 10 ML: 604.72 INJECTION INTRAVENOUS at 01:07
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
## 2019-07-28 NOTE — ASSESSMENT & PLAN NOTE
-increased scrotal swelling over the last 2 weeks  -endorses urinary incontinence over this period as well  -UA with 2+ bilirubin, otherwise normal  -likely 2/2 IVC compression by abdominal mass

## 2019-07-28 NOTE — ASSESSMENT & PLAN NOTE
-CT ABD/Pelvis showing a large 26 x 25 x 20 cm simple appearing cystic mass which cannot be clearly defined as extrahepatic, compressing common bile duct in displacing all intra-abdominal organs without evidence of GI or  obstruction    -Consult to IR for drainage, med management per medicine team  -, CEA, and Lipase mildy elevated at 57, 6.5, and 74 respectively  - WNL  -Acute Hepatitis Panel pending  -stool analysis for parasites, ova, cysts pending  -stool WBC pending  -FOBT pending  -Gen Surg consulted on 07/26 and recommended urgent drainage by IR with q4 neurovascular checks  -IR to drain on Monday, 07/29  -MRCP performed, cystic mass arising from liver  -hepatology consulted  -AFP ordered, f/u results

## 2019-07-28 NOTE — SUBJECTIVE & OBJECTIVE
Review of Systems   Constitutional: Positive for appetite change and unexpected weight change (weight gain). Negative for chills and fever.   Respiratory: Negative for cough and shortness of breath.    Cardiovascular: Positive for leg swelling. Negative for chest pain.   Gastrointestinal: Positive for abdominal distention. Negative for abdominal pain, blood in stool, constipation, diarrhea, nausea and vomiting.   Genitourinary: Positive for scrotal swelling.   Musculoskeletal: Negative for arthralgias.   Skin: Negative for pallor.   Neurological: Negative for light-headedness and headaches.   Psychiatric/Behavioral: Negative for behavioral problems and confusion.       History reviewed. No pertinent past medical history.    History reviewed. No pertinent surgical history.    Family history of liver disease: No    Review of patient's allergies indicates:  No Known Allergies    Tobacco Use    Smoking status: Never Smoker    Smokeless tobacco: Never Used   Substance and Sexual Activity    Alcohol use: Not Currently     Comment: quick drinking 6 years ago    Drug use: Never    Sexual activity: Not Currently     Partners: Female       No medications prior to admission.       Objective:     Vital Signs (Most Recent):  Temp: 98.3 °F (36.8 °C) (07/28/19 0833)  Pulse: 81 (07/28/19 0833)  Resp: 14 (07/28/19 0833)  BP: 125/84 (07/28/19 0833)  SpO2: 100 % (07/28/19 0833) Vital Signs (24h Range):  Temp:  [97.4 °F (36.3 °C)-98.3 °F (36.8 °C)] 98.3 °F (36.8 °C)  Pulse:  [64-81] 81  Resp:  [11-14] 14  SpO2:  [99 %-100 %] 100 %  BP: (124-125)/(84-89) 125/84     Weight: 79.8 kg (175 lb 14.8 oz) (07/26/19 2138)  Body mass index is 29.28 kg/m².    Physical Exam   Constitutional: He appears ill. No distress.   Eyes: No scleral icterus.   Neck: Neck supple.   Cardiovascular: Regular rhythm and normal heart sounds.   No murmur heard.  2+ b/l LE edema   Pulmonary/Chest: Effort normal and breath sounds normal. No respiratory distress.    Abdominal: Soft. Bowel sounds are normal. He exhibits distension. There is no tenderness.   Lymphadenopathy:     He has no cervical adenopathy.   Neurological: He is alert.   Skin: Skin is warm and dry.   Psychiatric: He has a normal mood and affect. His behavior is normal.   Nursing note and vitals reviewed.      MELD-Na score: 10 at 7/28/2019  3:31 AM  MELD score: 10 at 7/28/2019  3:31 AM  Calculated from:  Serum Creatinine: 0.6 mg/dL (Rounded to 1 mg/dL) at 7/28/2019  3:31 AM  Serum Sodium: 138 mmol/L (Rounded to 137 mmol/L) at 7/28/2019  3:31 AM  Total Bilirubin: 2.8 mg/dL at 7/28/2019  3:31 AM  INR(ratio): 1.0 at 7/26/2019  5:19 PM  Age: 53 years    Significant Labs:  Labs within the past month have been reviewed.    Significant Imaging:  Reviewed

## 2019-07-28 NOTE — SUBJECTIVE & OBJECTIVE
Review of Systems   Constitutional: Negative for chills and fever.   HENT: Negative for trouble swallowing.    Eyes: Negative for visual disturbance.   Respiratory: Negative for shortness of breath.    Cardiovascular: Positive for chest pain and leg swelling. Negative for palpitations.   Gastrointestinal: Positive for abdominal distention and abdominal pain. Negative for blood in stool, constipation, diarrhea, nausea and vomiting.   Genitourinary: Positive for penile swelling and scrotal swelling.        Urinary incontinence   Musculoskeletal: Negative for back pain.        Bilateral LE swelling   Skin: Negative for rash.   Neurological: Negative for dizziness and light-headedness.   Psychiatric/Behavioral: Negative for confusion.     Objective:     Vital Signs (Most Recent):  Temp: 98.3 °F (36.8 °C) (07/28/19 0833)  Pulse: 81 (07/28/19 0833)  Resp: 14 (07/28/19 0833)  BP: 125/84 (07/28/19 0833)  SpO2: 100 % (07/28/19 0833) Vital Signs (24h Range):  Temp:  [97.4 °F (36.3 °C)-98.3 °F (36.8 °C)] 98.3 °F (36.8 °C)  Pulse:  [64-81] 81  Resp:  [11-14] 14  SpO2:  [99 %-100 %] 100 %  BP: (124-125)/(84-89) 125/84     Weight: 79.8 kg (175 lb 14.8 oz)  Body mass index is 29.28 kg/m².    Physical Exam   Constitutional: He is oriented to person, place, and time. He appears well-developed and well-nourished. No distress.   HENT:   Head: Normocephalic and atraumatic.   Eyes: EOM are normal. No scleral icterus.   Neck: Normal range of motion. No JVD present.   Cardiovascular: Normal rate and normal heart sounds.   Pulmonary/Chest: Effort normal and breath sounds normal. No respiratory distress.   Abdominal: Bowel sounds are normal. He exhibits distension and mass. There is tenderness. There is no rebound and no guarding.   Enlarged and distended abdomen.    Genitourinary:   Genitourinary Comments: Enlarged scrotum more on the L side than R   Musculoskeletal: Normal range of motion. He exhibits edema.   Bilateral lower extremity  edema    Neurological: He is alert and oriented to person, place, and time.   Psychiatric: His behavior is normal.         CRANIAL NERVES     CN III, IV, VI   Extraocular motions are normal.        Significant Labs: All pertinent labs within the past 24 hours have been reviewed.    Significant Imaging: I have reviewed and interpreted all pertinent imaging results/findings within the past 24 hours.

## 2019-07-28 NOTE — ASSESSMENT & PLAN NOTE
-progressive history of bilateral lower extremity swelling over the last few years with skin tearing present on exam  -likely 2/2 IVC compression via abdominal cystic mass  -IR to drain abdominal mass on Monday 07/29  -baseline doppler with q4 neurovascular checks

## 2019-07-28 NOTE — PROGRESS NOTES
[facility] Hospital Medicine  Progress Note    Patient Name: Kishan Sánchez  MRN: [id number]  Patient Class: IP- Inpatient   Admission Date: 7/26/2019  Length of Stay: 2 days  Attending Physician: Janie Hampton MD  Primary Care Provider: Primary Doctor [facility] Medicine Team: [facility]OSP MED 2 Hipolito Velazquez MD    Subjective:     Principal Problem:Generalized abdominal mass      HPI:  Patient is a 52 yo M with no PMH, has not seen a doctor in his life, presents with an increasing abdomen and scrotum. It is unknown exactly when this all started as the patient is a poor historian (most of the history was obtained via his sister and niece), however, it seems like this has been ongoing for the last few years. He has had progressive distension of his abdomen for the last few years, without any pain. Two weeks ago he started to have some chest pain so he decided to go to [facility], from which he was discharged with a follow up CT Abd appointment. Today he went to his CT Abd appointment, and when he got home, his niece noticed that he had an enlarged scrotum while he was using the bathroom, so insisted that he come to [facility] in [place]. He also has had urinary incontinence starting 2 weeks ago which has progressively gotten worse, now wearing diapers. Also, over these past few years of progressive abdominal distension, he has had progressive leg swelling bilaterally. He endorses weight gain but does not know how much. He denies fevers, chills, chest pain, SOB. He denies any changes in his bowel movements and states that his last one was today which was normal. He denies N/V.    Denies ever seeing a doctor. Denies any PMH and PSH.    He works as a  and lives with his sister. Denies tobacco and IV drug use as well as drugs not prescribed to him. He denies any history of blood transfusions. Endorses remote history of drinking 2 beers daily, but stopped in 1989. His mother  passed away from ovarian cancer and his father from lung cancer. Otherwise, family history is unknown.    CT ABD/Pelvis showing a large 26 x 25 x 20 cm simple appearing cystic mass which cannot be clearly defined as extrahepatic, compressing common bile duct in displacing all intra-abdominal organs without evidence of GI or  obstruction.    In the ED, VSS, WBC 8.4, Alk P 918, , , troponin 0.006.     Overview/Hospital Course:  Patient was admitted to hospital medicine on 07/26 for abdominal distension and pain 2/2 cystic abdominal mass. A CEA was ordered and came back elevated at 6.5 and a  was ordered and was elevated at 57. Lipase was ordered and was mildly elevated at 74. UA showed 2+ bilirubin but was otherwise normal and non-concerning for UTI. On 07/27, General Surgery was consulted and recommended that IR place a drain urgently, due to concerns for LE edema. Neurovascular checks were scheduled q4 and IR was consulted who scheduled him for drainage on Monday, 07/29. A Ca 19-9 was ordered which resulted as being low. On 07/28, IR recommended a MRCP and hepatology consult. MRCP was revealing of cystic mass arising from the liver. Hepatology was consulted. He was placed NPO after midnight and started on a regular diet, as he was not complaining of any difficulties swallowing or any trouble eating/passing food.     Interval History:  NAEO. Complaints of mild abdominal pain. Stating that he is hungry. Denies trouble swallowing or any difficulty eating. Denies bowel/bladder issues. Denies fever/chills/N/V.    Review of Systems   Constitutional: Negative for chills and fever.   HENT: Negative for trouble swallowing.    Eyes: Negative for visual disturbance.   Respiratory: Negative for shortness of breath.    Cardiovascular: Positive for chest pain and leg swelling. Negative for palpitations.   Gastrointestinal: Positive for abdominal distention and abdominal pain. Negative for blood in stool,  constipation, diarrhea, nausea and vomiting.   Genitourinary: Positive for penile swelling and scrotal swelling.        Urinary incontinence   Musculoskeletal: Negative for back pain.        Bilateral LE swelling   Skin: Negative for rash.   Neurological: Negative for dizziness and light-headedness.   Psychiatric/Behavioral: Negative for confusion.     Objective:     Vital Signs (Most Recent):  Temp: 98.3 °F (36.8 °C) (07/28/19 0833)  Pulse: 81 (07/28/19 0833)  Resp: 14 (07/28/19 0833)  BP: 125/84 (07/28/19 0833)  SpO2: 100 % (07/28/19 0833) Vital Signs (24h Range):  Temp:  [97.4 °F (36.3 °C)-98.3 °F (36.8 °C)] 98.3 °F (36.8 °C)  Pulse:  [64-81] 81  Resp:  [11-14] 14  SpO2:  [99 %-100 %] 100 %  BP: (124-125)/(84-89) 125/84     Weight: 79.8 kg (175 lb 14.8 oz)  Body mass index is 29.28 kg/m².    Physical Exam   Constitutional: He is oriented to person, place, and time. He appears well-developed and well-nourished. No distress.   HENT:   Head: Normocephalic and atraumatic.   Eyes: EOM are normal. No scleral icterus.   Neck: Normal range of motion. No JVD present.   Cardiovascular: Normal rate and normal heart sounds.   Pulmonary/Chest: Effort normal and breath sounds normal. No respiratory distress.   Abdominal: Bowel sounds are normal. He exhibits distension and mass. There is tenderness. There is no rebound and no guarding.   Enlarged and distended abdomen.    Genitourinary:   Genitourinary Comments: Enlarged scrotum more on the L side than R   Musculoskeletal: Normal range of motion. He exhibits edema.   Bilateral lower extremity edema    Neurological: He is alert and oriented to person, place, and time.   Psychiatric: His behavior is normal.         CRANIAL NERVES     CN III, IV, VI   Extraocular motions are normal.        Significant Labs: All pertinent labs within the past 24 hours have been reviewed.    Significant Imaging: I have reviewed and interpreted all pertinent imaging results/findings within the past  24 hours.      Assessment/Plan:      * Generalized abdominal mass  -CT ABD/Pelvis showing a large 26 x 25 x 20 cm simple appearing cystic mass which cannot be clearly defined as extrahepatic, compressing common bile duct in displacing all intra-abdominal organs without evidence of GI or  obstruction    -Consult to IR for drainage, med management per medicine team  -, CEA, and Lipase mildy elevated at 57, 6.5, and 74 respectively  - WNL  -Acute Hepatitis Panel pending  -stool analysis for parasites, ova, cysts pending  -stool WBC pending  -FOBT pending  -Gen Surg consulted on 07/26 and recommended urgent drainage by IR with q4 neurovascular checks  -IR to drain on Monday, 07/29  -MRCP performed, cystic mass arising from liver  -hepatology consulted  -AFP ordered, f/u results            Scrotal edema  -increased scrotal swelling over the last 2 weeks  -endorses urinary incontinence over this period as well  -UA with 2+ bilirubin, otherwise normal  -likely 2/2 IVC compression by abdominal mass      Bilateral lower extremity edema  -progressive history of bilateral lower extremity swelling over the last few years with skin tearing present on exam  -likely 2/2 IVC compression via abdominal cystic mass  -IR to drain abdominal mass on Monday 07/29  -baseline doppler with q4 neurovascular checks      Weight gain  -weight gain over the last few years, unsure time-course or amount gained  -CT ABD with large abdominal cystic mass  -IR to drain on Monday 07/29        VTE Risk Mitigation (From admission, onward)        Ordered     IP VTE LOW RISK PATIENT  Once      07/27/19 1012     Place sequential compression device  Until discontinued      07/26/19 1946                Hipolito Velazquez MD  Department of Hospital Medicine   [facility]

## 2019-07-28 NOTE — ASSESSMENT & PLAN NOTE
Mildly elevated AST / /150 and Tbili 2.8.  No clear evidence of underlying liver disease. Mild elevations in liver enzymes and bilirubin due to extrinsic compression from cyst.    - Agree with IR drainage of cyst  - Fluid studies: please send cell count, cultures, cytology, bilirubin, CEA, amoeba serologies / PCR

## 2019-07-29 PROBLEM — R23.9 ALTERATION IN SKIN INTEGRITY: Status: ACTIVE | Noted: 2019-07-29

## 2019-07-29 LAB
ACID FAST MOD KINY STN SPEC: NORMAL
ALBUMIN FLD-MCNC: 1.2 G/DL
ALBUMIN SERPL BCP-MCNC: 1.7 G/DL (ref 3.5–5.2)
ALP SERPL-CCNC: 885 U/L (ref 55–135)
ALT SERPL W/O P-5'-P-CCNC: 148 U/L (ref 10–44)
AMYLASE, BODY FLUID: 57 U/L
ANION GAP SERPL CALC-SCNC: 7 MMOL/L (ref 8–16)
AST SERPL-CCNC: 164 U/L (ref 10–40)
BASOPHILS # BLD AUTO: 0.02 K/UL (ref 0–0.2)
BASOPHILS NFR BLD: 0.2 % (ref 0–1.9)
BILIRUB SERPL-MCNC: 3.1 MG/DL (ref 0.1–1)
BODY FLUID SOURCE AMYLASE: NORMAL
BODY FLUID SOURCE, LDH: NORMAL
BUN SERPL-MCNC: 12 MG/DL (ref 6–20)
CALCIUM SERPL-MCNC: 8.1 MG/DL (ref 8.7–10.5)
CHLORIDE SERPL-SCNC: 103 MMOL/L (ref 95–110)
CO2 SERPL-SCNC: 25 MMOL/L (ref 23–29)
CREAT SERPL-MCNC: 0.6 MG/DL (ref 0.5–1.4)
DIFFERENTIAL METHOD: ABNORMAL
EOSINOPHIL # BLD AUTO: 0.1 K/UL (ref 0–0.5)
EOSINOPHIL NFR BLD: 0.9 % (ref 0–8)
ERYTHROCYTE [DISTWIDTH] IN BLOOD BY AUTOMATED COUNT: 19.8 % (ref 11.5–14.5)
EST. GFR  (AFRICAN AMERICAN): >60 ML/MIN/1.73 M^2
EST. GFR  (NON AFRICAN AMERICAN): >60 ML/MIN/1.73 M^2
GLUCOSE FLD-MCNC: 38 MG/DL
GLUCOSE SERPL-MCNC: 101 MG/DL (ref 70–110)
HAV IGM SERPL QL IA: NEGATIVE
HBV CORE IGM SERPL QL IA: NEGATIVE
HBV SURFACE AG SERPL QL IA: NEGATIVE
HCT VFR BLD AUTO: 35.4 % (ref 40–54)
HCV AB SERPL QL IA: NEGATIVE
HGB BLD-MCNC: 11.1 G/DL (ref 14–18)
IMM GRANULOCYTES # BLD AUTO: 0.04 K/UL (ref 0–0.04)
IMM GRANULOCYTES NFR BLD AUTO: 0.5 % (ref 0–0.5)
KOH PREP SPEC: NORMAL
LDH FLD L TO P-CCNC: 954 U/L
LYMPHOCYTES # BLD AUTO: 1.3 K/UL (ref 1–4.8)
LYMPHOCYTES NFR BLD: 15.5 % (ref 18–48)
MAGNESIUM SERPL-MCNC: 1.8 MG/DL (ref 1.6–2.6)
MCH RBC QN AUTO: 26.2 PG (ref 27–31)
MCHC RBC AUTO-ENTMCNC: 31.4 G/DL (ref 32–36)
MCV RBC AUTO: 84 FL (ref 82–98)
MONOCYTES # BLD AUTO: 0.7 K/UL (ref 0.3–1)
MONOCYTES NFR BLD: 8.7 % (ref 4–15)
NEUTROPHILS # BLD AUTO: 6.3 K/UL (ref 1.8–7.7)
NEUTROPHILS NFR BLD: 74.2 % (ref 38–73)
NRBC BLD-RTO: 0 /100 WBC
PHOSPHATE SERPL-MCNC: 3 MG/DL (ref 2.7–4.5)
PLATELET # BLD AUTO: 447 K/UL (ref 150–350)
PMV BLD AUTO: 10.2 FL (ref 9.2–12.9)
POTASSIUM SERPL-SCNC: 4.5 MMOL/L (ref 3.5–5.1)
PROT FLD-MCNC: 4.1 G/DL
PROT SERPL-MCNC: 6 G/DL (ref 6–8.4)
RBC # BLD AUTO: 4.24 M/UL (ref 4.6–6.2)
SODIUM SERPL-SCNC: 135 MMOL/L (ref 136–145)
SPECIMEN SOURCE: NORMAL
WBC # BLD AUTO: 8.46 K/UL (ref 3.9–12.7)

## 2019-07-29 PROCEDURE — 88112 CYTOPATH CELL ENHANCE TECH: CPT | Mod: 26,,, | Performed by: PATHOLOGY

## 2019-07-29 PROCEDURE — 99000 SPECIMEN HANDLING OFFICE-LAB: CPT

## 2019-07-29 PROCEDURE — 36415 COLL VENOUS BLD VENIPUNCTURE: CPT

## 2019-07-29 PROCEDURE — 99233 SBSQ HOSP IP/OBS HIGH 50: CPT | Mod: ,,, | Performed by: HOSPITALIST

## 2019-07-29 PROCEDURE — 88305 CYTOLOGY SPECIMEN- MEDICAL CYTOLOGY (FLUID/WASH/BRUSH): ICD-10-PCS | Mod: 26,,, | Performed by: PATHOLOGY

## 2019-07-29 PROCEDURE — 99233 PR SUBSEQUENT HOSPITAL CARE,LEVL III: ICD-10-PCS | Mod: ,,, | Performed by: HOSPITALIST

## 2019-07-29 PROCEDURE — 83690 ASSAY OF LIPASE: CPT

## 2019-07-29 PROCEDURE — 87210 SMEAR WET MOUNT SALINE/INK: CPT

## 2019-07-29 PROCEDURE — 99233 PR SUBSEQUENT HOSPITAL CARE,LEVL III: ICD-10-PCS | Mod: ,,, | Performed by: INTERNAL MEDICINE

## 2019-07-29 PROCEDURE — 84157 ASSAY OF PROTEIN OTHER: CPT

## 2019-07-29 PROCEDURE — 88305 TISSUE EXAM BY PATHOLOGIST: CPT | Mod: 26,,, | Performed by: PATHOLOGY

## 2019-07-29 PROCEDURE — 87206 SMEAR FLUORESCENT/ACID STAI: CPT

## 2019-07-29 PROCEDURE — 88305 TISSUE EXAM BY PATHOLOGIST: CPT | Performed by: PATHOLOGY

## 2019-07-29 PROCEDURE — 85025 COMPLETE CBC W/AUTO DIFF WBC: CPT

## 2019-07-29 PROCEDURE — 80053 COMPREHEN METABOLIC PANEL: CPT

## 2019-07-29 PROCEDURE — 11000001 HC ACUTE MED/SURG PRIVATE ROOM

## 2019-07-29 PROCEDURE — 83735 ASSAY OF MAGNESIUM: CPT

## 2019-07-29 PROCEDURE — 82945 GLUCOSE OTHER FLUID: CPT

## 2019-07-29 PROCEDURE — 99233 SBSQ HOSP IP/OBS HIGH 50: CPT | Mod: ,,, | Performed by: INTERNAL MEDICINE

## 2019-07-29 PROCEDURE — 84100 ASSAY OF PHOSPHORUS: CPT

## 2019-07-29 PROCEDURE — 82042 OTHER SOURCE ALBUMIN QUAN EA: CPT

## 2019-07-29 PROCEDURE — 63600175 PHARM REV CODE 636 W HCPCS: Performed by: RADIOLOGY

## 2019-07-29 PROCEDURE — 83615 LACTATE (LD) (LDH) ENZYME: CPT

## 2019-07-29 PROCEDURE — 88112 CYTOLOGY SPECIMEN- MEDICAL CYTOLOGY (FLUID/WASH/BRUSH): ICD-10-PCS | Mod: 26,,, | Performed by: PATHOLOGY

## 2019-07-29 PROCEDURE — 82150 ASSAY OF AMYLASE: CPT

## 2019-07-29 PROCEDURE — 86753 PROTOZOA ANTIBODY NOS: CPT

## 2019-07-29 RX ORDER — FENTANYL CITRATE 50 UG/ML
INJECTION, SOLUTION INTRAMUSCULAR; INTRAVENOUS CODE/TRAUMA/SEDATION MEDICATION
Status: COMPLETED | OUTPATIENT
Start: 2019-07-29 | End: 2019-07-29

## 2019-07-29 RX ADMIN — FENTANYL CITRATE 50 MCG: 50 INJECTION, SOLUTION INTRAMUSCULAR; INTRAVENOUS at 10:07

## 2019-07-29 NOTE — PLAN OF CARE
Plan of care reviewed with patient, patient verbalizes understanding. Pt arrived to ctfor abscess drain Pt oriented to unit and staff. Comfort measures utilized. Pt safely transferred from stretcher to procedural table. Fall risk reviewed with patient, fall risk interventions maintained. Safety strap applied, positioner pillows utilized to minimize pressure points. Blankets applied. Pt prepped and draped utilizing standard sterile technique. Patient placed on continuous monitoring, as required by sedation policy. Timeouts completed utilizing standard universal time-out, per department and facility policy. RN to remain at bedside, continuous monitoring maintained. Pt resting comfortably. Denies pain/discomfort. Will continue to monitor. See flow sheets for monitoring, medication administration, and updates.

## 2019-07-29 NOTE — SUBJECTIVE & OBJECTIVE
Interval History: IR drainage of abscess today; 7L fluid removed. Aspirate studies pending.     Review of Systems   Constitutional: Positive for appetite change and unexpected weight change (weight gain). Negative for chills and fever.   Respiratory: Negative for cough and shortness of breath.    Cardiovascular: Positive for leg swelling. Negative for chest pain.   Gastrointestinal: Positive for abdominal distention. Negative for abdominal pain, blood in stool, constipation, diarrhea, nausea and vomiting.   Genitourinary: Positive for scrotal swelling.   Musculoskeletal: Negative for arthralgias.   Skin: Negative for pallor.   Neurological: Negative for light-headedness and headaches.   Psychiatric/Behavioral: Negative for behavioral problems and confusion.     Objective:     Vital Signs (Most Recent):  Temp: 97.5 °F (36.4 °C) (07/29/19 1149)  Pulse: 74 (07/29/19 1149)  Resp: 13 (07/29/19 1149)  BP: 101/69 (07/29/19 1149)  SpO2: 99 % (07/29/19 1149) Vital Signs (24h Range):  Temp:  [97.5 °F (36.4 °C)-98 °F (36.7 °C)] 97.5 °F (36.4 °C)  Pulse:  [71-86] 74  Resp:  [13-20] 13  SpO2:  [97 %-100 %] 99 %  BP: (101-150)/() 101/69     Weight: 79.8 kg (175 lb 14.8 oz)  Body mass index is 29.28 kg/m².    Intake/Output Summary (Last 24 hours) at 7/29/2019 1516  Last data filed at 7/29/2019 1102  Gross per 24 hour   Intake --   Output 7200 ml   Net -7200 ml      Physical Exam   Constitutional: He is oriented to person, place, and time. He appears well-developed and well-nourished. No distress.   HENT:   Head: Normocephalic and atraumatic.   Eyes: EOM are normal. No scleral icterus.   Neck: Normal range of motion. No JVD present.   Cardiovascular: Normal rate and normal heart sounds.   Pulmonary/Chest: Effort normal and breath sounds normal. No respiratory distress.   Abdominal: Bowel sounds are normal. He exhibits distension and mass. There is tenderness. There is no rebound and no guarding.   Enlarged and distended  abdomen.    Genitourinary:   Genitourinary Comments: Enlarged scrotum more on the L side than R   Musculoskeletal: Normal range of motion. He exhibits edema.   Bilateral lower extremity edema    Neurological: He is alert and oriented to person, place, and time.   Psychiatric: His behavior is normal.     Significant Labs:   CBC:   Recent Labs   Lab 07/28/19 0331 07/29/19 0615   WBC 8.71 8.46   HGB 11.3* 11.1*   HCT 35.4* 35.4*   * 447*     CMP:   Recent Labs   Lab 07/28/19 0331 07/29/19 0615    135*   K 4.0 4.5    103   CO2 28 25   GLU 91 101   BUN 13 12   CREATININE 0.6 0.6   CALCIUM 8.1* 8.1*   PROT 6.1 6.0   ALBUMIN 1.7* 1.7*   BILITOT 2.8* 3.1*   ALKPHOS 918* 885*   * 164*   * 148*   ANIONGAP 6* 7*   EGFRNONAA >60.0 >60.0       Significant Imaging: I have reviewed all pertinent imaging results/findings within the past 24 hours.

## 2019-07-29 NOTE — PLAN OF CARE
Problem: Adult Inpatient Plan of Care  Goal: Plan of Care Review  Outcome: Ongoing (interventions implemented as appropriate)  Pt turns and repositions with assist of staff x1. No skin breakdown noted. Pt pain and safety monitored q 1-2 hrs this shift.  Dressing changed to right medial thigh per wound care nurse today. Drain to right flank in place, draining red exudate. Bed locked and in lowest position. Rails elevated x 3. Brakes on. Call light and personal belongings in reach. Will continue to monitor.   07/29/19 1848   Plan of Care Review   Plan of Care Reviewed With patient   Progress improving       Problem: Fall Injury Risk  Goal: Absence of Fall and Fall-Related Injury  Outcome: Ongoing (interventions implemented as appropriate)  Intervention: Identify and Manage Contributors to Fall Injury Risk     07/29/19 1848   Manage Acute Allergic Reaction   Medication Review/Management medications reviewed   Identify and Manage Contributors to Fall Injury Risk   Self-Care Promotion independence encouraged;BADL personal objects within reach;BADL personal routines maintained;safe use of adaptive equipment encouraged     Intervention: Promote Injury-Free Environment     07/29/19 1848   Optimize [place] and Functional Mobility   Environmental Safety Modification assistive device/personal items within reach;clutter free environment maintained;lighting adjusted;room organization consistent   Optimize Balance and Safe Activity   Safety Promotion/Fall Prevention assistive device/personal item within reach;commode/urinal/bedpan at bedside;Fall Risk reviewed with patient/family;Fall Risk signage in place;lighting adjusted;muscle strengthening facilitated;nonskid shoes/socks when out of bed;side rails raised x 2;instructed to call staff for mobility         Problem: Skin Injury Risk Increased  Goal: Skin Health and Integrity  Outcome: Ongoing (interventions implemented as appropriate)  Intervention: Optimize Skin  Protection     07/29/19 1848   Prevent Additional Skin Injury   Head of Bed (HOB) HOB at 60-90 degrees   Pressure Reduction Devices positioning supports utilized;pressure-redistributing mattress utilized   Pressure Reduction Techniques frequent weight shift encouraged     Intervention: Promote and Optimize Oral Intake     07/29/19 1848   Monitor and Manage Anemia   Oral Nutrition Promotion safe use of adaptive equipment encouraged

## 2019-07-29 NOTE — H&P
Inpatient Radiology Pre-procedure Note    History of Present Illness:  Kishan Sánchez is a 53 y.o. male who presents for drainage of cystic lesion adjacent to the liver on a b/g of increased abdominal swelling. No significant past medical history given lack of established care.  Admission H&P reviewed.  History reviewed. No pertinent past medical history.  History reviewed. No pertinent surgical history.    Review of Systems:   As documented in primary team H&P    Home Meds:   Prior to Admission medications    Not on File     Scheduled Meds:   Continuous Infusions:   PRN Meds:acetaminophen, acetaminophen, Dextrose 10% Bolus, Dextrose 10% Bolus, glucagon (human recombinant), glucose, glucose, polyethylene glycol, promethazine, sodium chloride 0.9%, sodium chloride 0.9%  Anticoagulants/Antiplatelets: no anticoagulation    Allergies: Review of patient's allergies indicates:  No Known Allergies  Sedation Hx: have not been any systemic reactions    Labs:  Recent Labs   Lab 07/26/19 1719   INR 1.0       Recent Labs   Lab 07/29/19 0615   WBC 8.46   HGB 11.1*   HCT 35.4*   MCV 84   *      Recent Labs   Lab 07/29/19 0615      *   K 4.5      CO2 25   BUN 12   CREATININE 0.6   CALCIUM 8.1*   MG 1.8   *   *   ALBUMIN 1.7*   BILITOT 3.1*         Vitals:  Temp: 97.9 °F (36.6 °C) (07/29/19 0603)  Pulse: 71 (07/29/19 0603)  Resp: 16 (07/29/19 0603)  BP: 111/79 (07/29/19 0603)  SpO2: 100 % (07/29/19 0603)     Physical Exam:  ASA: 2  Mallampati: 2    General: no acute distress  Mental Status: alert and oriented to person, place and time  HEENT: normocephalic, atraumatic  Chest: unlabored breathing  Heart: regular heart rate  Abdomen: distended  Extremity: dependent edema, moves all extremities    Plan: abdominal cystic lesion drainage  Sedation Plan: up to moderate    Renato Daniels MD  Resident  Department of Radiology  Pager: [phone]

## 2019-07-29 NOTE — PROGRESS NOTES
Procedure complete. Patient tolerated well. 8fr drain intact to right abdominal area connected to thi drain Dark colored fluid noted .Dressing clean dry and intact. Patient to transfer back to room via stretcher per transport. Report called.to nurseTracy.

## 2019-07-29 NOTE — MEDICAL/APP STUDENT
[facility]  Hepatology  Consult Note    Patient Name: Kishan Sánchez  MRN: [id number]  Admission Date: 2019  Hospital Length of Stay: 3 days  Attending Provider: Janie Hampton MD   Primary Care Physician: Primary Doctor No  Principal Problem:Generalized abdominal mass    Consults  Subjective:     Transplant status: No    HPI: Mr. Sánchez is a 52 yo man with unknown PMHx here for evaluation of a cystic liver lesion. Pt has not seen a physician during his adult life. He cannot definitely state when he first noticed the mass but believes that it has been there for years. He initially presented to [facility] for chest pain, cardiac workup was unremarkable, however he was found to have    Tbili 2.4 and US Abdomen revealed a 27 x 23 x 19 cm cystic lesion with intrahepatic biliary dilation. Since his initial presentation he has developed groin swelling, pedal edema and urinary incontinence x 2 weeks which prompted him to come to [facility]. He has intermittent abdominal pain, weight gain, and acid reflux associated with the mass. Denies any nasuea, vomiting, fevers, chills, diarrhea, constipation or dysphagia. He has no travel history, no exposure to animals, no recent swimming, no consumption of raw meat.     Patient underwent MRCP during this admission which was significant for the mass compressing the bowel, stomach, portal vein, and IVC and showed several subcentimeter hepatic cysts. He is scheduled to undergo IR drainage today.     FHx - No liver disease. Mother passed away from ovarian cancer. Father had lung cancer. Sister  from gastric cancer.     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 He used to drink 2-3 beers daily but quit 6 years ago. Denies any drug use.       Review of Systems   Constitutional: Positive for appetite change and unexpected weight change. Negative for chills and fever.   Eyes: Negative for pain and visual disturbance.    Respiratory: Negative for cough, chest tightness and shortness of breath.    Cardiovascular: Positive for chest pain and leg swelling. Negative for palpitations.   Gastrointestinal: Positive for abdominal distention and abdominal pain. Negative for blood in stool, constipation, diarrhea, nausea and vomiting.   Genitourinary: Positive for frequency and scrotal swelling. Negative for hematuria.   Musculoskeletal: Negative for arthralgias and joint swelling.   Skin: Negative for color change and rash.   Neurological: Negative for syncope, weakness and light-headedness.       History reviewed. No pertinent past medical history.    History reviewed. No pertinent surgical history.    Review of patient's allergies indicates:  No Known Allergies    Tobacco Use    Smoking status: Never Smoker    Smokeless tobacco: Never Used   Substance and Sexual Activity    Alcohol use: Not Currently     Comment: quick drinking 6 years ago    Drug use: Never    Sexual activity: Not Currently     Partners: Female       No medications prior to admission.       Objective:     Vital Signs (Most Recent):  Temp: 97.9 °F (36.6 °C) (07/29/19 0603)  Pulse: 71 (07/29/19 0603)  Resp: 16 (07/29/19 0603)  BP: 111/79 (07/29/19 0603)  SpO2: 100 % (07/29/19 0603)  O2 Device (Oxygen Therapy): room air (07/26/19 2052) Vital Signs (24h Range):  Temp:  [97.9 °F (36.6 °C)-98.3 °F (36.8 °C)] 97.9 °F (36.6 °C)  Pulse:  [71-84] 71  Resp:  [13-16] 16  SpO2:  [97 %-100 %] 100 %  BP: (107-150)/() 111/79     Weight: 79.8 kg (175 lb 14.8 oz) (07/26/19 2138)  Body mass index is 29.28 kg/m².    Physical Exam   Constitutional: He is oriented to person, place, and time. Vital signs are normal. He appears cachectic. He is cooperative.   HENT:   Mouth/Throat: Oropharynx is clear and moist and mucous membranes are normal. Abnormal dentition. Dental caries present.   Eyes: Pupils are equal, round, and reactive to light. Conjunctivae, EOM and lids are normal. No  scleral icterus.   Cardiovascular: Normal rate, regular rhythm and normal heart sounds.   No murmur heard.  Pulmonary/Chest: Effort normal and breath sounds normal. No respiratory distress.   Abdominal: He exhibits distension and mass. He exhibits no ascites. Bowel sounds are decreased. There is no tenderness. There is no rebound and no guarding.   Musculoskeletal: He exhibits edema. He exhibits no tenderness.   Neurological: He is alert and oriented to person, place, and time.   Skin: Skin is warm and dry. No rash noted.       MELD-Na score: 10 at 7/28/2019  3:31 AM  MELD score: 10 at 7/28/2019  3:31 AM  Calculated from:  Serum Creatinine: 0.6 mg/dL (Rounded to 1 mg/dL) at 7/28/2019  3:31 AM  Serum Sodium: 138 mmol/L (Rounded to 137 mmol/L) at 7/28/2019  3:31 AM  Total Bilirubin: 2.8 mg/dL at 7/28/2019  3:31 AM  INR(ratio): 1.0 at 7/26/2019  5:19 PM  Age: 53 years    Significant Labs:  CBC:   Recent Labs   Lab 07/29/19  0615   WBC 8.46   RBC 4.24*   HGB 11.1*   HCT 35.4*   *     CMP:   Recent Labs   Lab 07/28/19  0331   GLU 91   CALCIUM 8.1*   ALBUMIN 1.7*   PROT 6.1      K 4.0   CO2 28      BUN 13   CREATININE 0.6   ALKPHOS 918*   *   *   BILITOT 2.8*     Coagulation:   Recent Labs   Lab 07/26/19  1719   INR 1.0       Significant Imaging:  EXAMINATION:  US ABDOMEN LIMITED    CLINICAL HISTORY:  Eval for biliary obstruction/Choledocholithiasis;    TECHNIQUE:  Limited ultrasound of the abdomen was performed.    COMPARISON:  None.    FINDINGS:  The pancreas is not well visualized secondary to a large cystic abdominal mass.    A large complex cystic mass containing debris is noted in the peritoneal cavity extending from the sternum to the pelvis measuring at least 27 x 23 x 19 cm.    The liver is normal in size measuring 16.4 cm in length.  No focal hepatic lesions are identified.    The main portal vein is patent with antegrade flow.  The inferior vena cava appears compressed.    Mild  intrahepatic biliary ductal dilatation is detected.  The suspected common bile duct is within normal limits at 0.4 cm although this area is difficult to assess secondary to compression from the large cystic abdominal mass.  The gallbladder is not definitely identified.    The right kidney is normal in size measuring 13.4 cm with no evidence of hydronephrosis.    The spleen is unremarkable.    The urinary bladder contains mobile debris and appears separate from the cystic mass described above.      Impression       A large complex cystic mass containing debris is noted in the peritoneal cavity extending from the sternum to the pelvis measuring at least 27 x 23 x 19 cm.  This structure appears to be compressing and displacing organs within the abdominal cavity.  Further assessment with a CT scan is recommended.    The gallbladder is not clearly visualized sonographically.  The structure suspected to be the common bile duct is normal caliber.  However, intrahepatic biliary ductal dilatation is detected.    Debris in the urinary bladder.     EXAMINATION:  CT CHEST ABDOMEN PELVIS W oral contrast only.    CLINICAL HISTORY:  abdominal mass;Left upper quadrant abdominal swelling, mass and lump    TECHNIQUE:  5 mm contiguous axial images are performed through the chest abdomen and pelvis following administration of IV and oral contrast .    COMPARISON:  None    FINDINGS:  CT scan chest:    Lung windows:    The central airways are widely patent.  No worrisome pulmonary nodules, masses or significant airspace disease is seen.  Left basilar pleuroparenchymal scarring and regions of focal bronchiectasis are noted.    Mediastinum:    Mediastinal windows show no mediastinal or hilar lymphadenopathy.  The heart size is normal.  The esophagus is distended with fluid, air-fluid level seen throughout consistent with likely functional gastric outlet obstruction by large abdominopelvic mass compressing all upper abdominal organs.       Impression     Impression:1.  No significant pulmonary abnormality identified; air-fluid levels throughout esophagus consistent with probable active reflux in patient with large upper abdominal mass compressing stomach and adjacent organs    CT scan abdomen:    Scans through the upper abdomen show a large 25 x 20 x 26 cm simple appearing bilobed cyst filling the entire right abdomen and upper pelvis, displacing intestines, stomach, and compressing extrahepatic bile ducts with associated small amount of free ascitic fluid.    The stomach is not distended.  The large and small bowel remain normal in caliber.  There is mild intrahepatic biliary dilatation likely from external compression by large cystic mass    The spleen, pancreas, adrenals and both kidneys remain normal in size and shape.  There is symmetric contrast enhancement of both kidneys without evidence of hydronephrosis.  Extensive subcutaneous edema seen throughout the chest and abdomen likely from venous compression of inferior vena cava by large mass.    CT scan pelvis:    Delayed scans through the entirety of abdomen and pelvis show both ureters to be symmetric and normal in size.  The bladder is normal in size.  A trace amount of free pelvic fluid is seen.  Bone windows appear normal for age.    Impression: 1.  Large 26 x 25 x 20 cm simple appearing cystic mass which cannot be clearly defined as extrahepatic, compressing common bile duct in displacing all intra-abdominal organs without evidence of GI or  obstruction.     EXAMINATION:  MRI ABDOMEN WITH AND WO_INC MRCP    CLINICAL HISTORY:  Abnormal liver function tests (LFTs);large intraabdominal cyst; unclear origin or involvement with organs;    TECHNIQUE:  Multiplanar multisequence images of the abdomen before and after administration of 10 mL Gadavist intravenous contrast. Additional thick slab fluid sensitive MRCP sequences were obtained with maximum intensity projection images for evaluation of  the biliary system.    COMPARISON:  CT chest abdomen pelvis 07/26/2019.  Ultrasound abdomen 07/16/2019.    FINDINGS:  Inferior Thorax: Trace bilateral pleural effusions with associated compressive atelectasis.  Heart size is normal.  No pericardial effusion.    Peritoneal Space: No ascites.    Liver: Partially visualized large cystic mass occupying most of the right abdomen and extending to the pelvis favored to be arising from the liver.  No associated abnormal enhancement.  No definite associates in with the bile ducts.  Associated localized mass effect displacing the bowel, stomach, pancreas and gallbladder, measuring at least 24.3 x 21.4 x 28.1 cm.  Multiple additional smaller cystic lesions scattered throughout the liver.  No abnormal enhancement.  The right hepatic vein is patent.  The remaining hepatic veins are not visualized.  The portal, splenic and superior mesenteric veins are patent.  The cystic mass results in mass effect on the portal vein and IVC with significant narrowing.    Gallbladder: Not seen.    Bile Ducts: Limited evaluation secondary to the aforementioned mass obscuring most of the bile ducts.  Mild diffuse intrahepatic biliary ductal dilatation likely due to mass effect at the level of the zulma hepatis.  The common bile duct where seen is normal in caliber.  No intraductal filling defect.    Pancreas: Partially visualized.  Mass effect on the head, neck and body by the large cystic mass in the right abdomen.  No mass or peripancreatic fat stranding.    Spleen: Normal.    Adrenals: Unremarkable.    Bowel/Mesentery: Small sized hiatal hernia.  No evidence of bowel obstruction or inflammation.    Urinary Tract: No mass or evidence of obstructive uropathy.    Retroperitoneum:  No significant adenopathy.    Abdominal wall:  Diffuse subcutaneous edema.    Vasculature: No abdominal aortic aneurysm.    Bones: Normal marrow.      Impression       Partially visualized nonenhancing large cystic mass  favored to be arising from the liver, occupying most of the right abdomen and extending to the pelvis with associated mass effect displacing the bowel, stomach, and pancreas.  Additional mass effect with resultant narrowing of the main portal vein and IVC.  Mass effect on the common duct results in mild diffuse intrahepatic biliary ductal dilatation.    Several subcentimeter hepatic cysts.    Small hiatal hernia.         Assessment/Plan:     Active Diagnoses:    Diagnosis Date Noted POA    PRINCIPAL PROBLEM:  Generalized abdominal mass [R19.07] 07/26/2019 Yes    Weight gain [R63.5] 07/26/2019 Unknown    Scrotal edema [N50.89] 07/26/2019 Unknown    Bilateral lower extremity edema [R60.0] 07/26/2019 Unknown      Problems Resolved During this Admission:     Hepatic Cyst    52 yo man here for cystic mass of the liver. DDx include simple cyst, hepatic cystadenoma, echinococcal cyst, polycystic liver disease, biloma. Pt does not have synthetic liver dysfunction    - Please send cystic fluid aspirate for cytology, culture, CEA, amylase, bilirubin  - F/u hepatitis panel, and trend liver enzymes   - Stool exam for ova, cysts, parasites  - Send entameoba serologies   - obtain UA        Thank you for your consult. I will follow-up with patient. Please contact us if you have any additional questions.    Dean Burk  Hepatology  [facility]

## 2019-07-29 NOTE — ASSESSMENT & PLAN NOTE
-CT ABD/Pelvis showing a large 26 x 25 x 20 cm simple appearing cystic mass which cannot be clearly defined as extrahepatic, compressing common bile duct in displacing all intra-abdominal organs without evidence of GI or  obstruction  - , CEA, and Lipase mildy elevated at 57, 6.5, and 74 respectively  -  WNL  -Gen Surg consulted on 07/26 and recommended urgent drainage by IR with q4 neurovascular checks  -MRCP performed, cystic mass arising from liver    - IR drainage of abscess 7/29; 7L volume removed  - f/u aspirate studies  - hepatology consulted
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
## 2019-07-29 NOTE — CONSULTS
"[facility]  Hepatology  Consult Note    Patient Name: Kishan Sánchez  MRN: [id number]  Admission Date: 7/26/2019  Hospital Length of Stay: 3 days  Attending Provider: Janie Hampton MD   Primary Care Physician: Primary Doctor No  Principal Problem:Generalized abdominal mass    Inpatient consult to Hepatology  Consult performed by: Armen Paige MD  Consult ordered by: Hipolito Velazquez MD        Subjective:     Transplant status: No    HPI:  Mr. Sánchez is a 52yo M w/no PMH (has not seen a doctor) who presents with scrotal swelling, leg swelling and abdominal distention. Patient is a poor historian as he cannot provide details on the timeline of his symptoms. He states his abdomen has been large for "years," but that in the last few weeks he noticed it was difficult to walk, difficult to get on his bike, due to leg swelling. Patient denies chest pain, but per chart review, he went to ED in [facility] two weeks ago for chest pain. CT a/p was ordered due to distended abdomen, which he completed 7/26 revealing a 26cm simply cystic abdominal mass compressing the CBD. Patient's niece noticed he had an enlarged scrotum while using the bathroom so she insisted he come into the ED. He otherwise denies abdominal pain, chest pain, SOB, weight loss (actually gain), fever,chills, nausea, vomiting, diarrhea, constipation. States he last drank alcohol in 1989, but per ED in [facility], last drink 6 years ago. Denies tobacco use or IVDU. Lives with his sister and works as a  at a restaurant. States mother and sister had "stomach cancer."    U/S 7/16 with complex cystic mass in peritoneum [id number] with intrahepatic biliary dilatation.  CT a/p with [id number] simple cystic mass compressing the CBD.  MRCP 7/28 with cystic mass arising from the liver with mass effect on portal vein and IVC.     General surgery consulted with recommendation for IR consult. IR recommending Hepatology evaluation.    Review of " Systems   Constitutional: Positive for appetite change and unexpected weight change (weight gain). Negative for chills and fever.   Respiratory: Negative for cough and shortness of breath.    Cardiovascular: Positive for leg swelling. Negative for chest pain.   Gastrointestinal: Positive for abdominal distention. Negative for abdominal pain, blood in stool, constipation, diarrhea, nausea and vomiting.   Genitourinary: Positive for scrotal swelling.   Musculoskeletal: Negative for arthralgias.   Skin: Negative for pallor.   Neurological: Negative for light-headedness and headaches.   Psychiatric/Behavioral: Negative for behavioral problems and confusion.       History reviewed. No pertinent past medical history.    History reviewed. No pertinent surgical history.    Family history of liver disease: No    Review of patient's allergies indicates:  No Known Allergies    Tobacco Use    Smoking status: Never Smoker    Smokeless tobacco: Never Used   Substance and Sexual Activity    Alcohol use: Not Currently     Comment: quick drinking 6 years ago    Drug use: Never    Sexual activity: Not Currently     Partners: Female       No medications prior to admission.       Objective:     Vital Signs (Most Recent):  Temp: 98.3 °F (36.8 °C) (07/28/19 0833)  Pulse: 81 (07/28/19 0833)  Resp: 14 (07/28/19 0833)  BP: 125/84 (07/28/19 0833)  SpO2: 100 % (07/28/19 0833) Vital Signs (24h Range):  Temp:  [97.4 °F (36.3 °C)-98.3 °F (36.8 °C)] 98.3 °F (36.8 °C)  Pulse:  [64-81] 81  Resp:  [11-14] 14  SpO2:  [99 %-100 %] 100 %  BP: (124-125)/(84-89) 125/84     Weight: 79.8 kg (175 lb 14.8 oz) (07/26/19 2138)  Body mass index is 29.28 kg/m².    Physical Exam   Constitutional: He appears ill. No distress.   Eyes: No scleral icterus.   Neck: Neck supple.   Cardiovascular: Regular rhythm and normal heart sounds.   No murmur heard.  2+ b/l LE edema   Pulmonary/Chest: Effort normal and breath sounds normal. No respiratory distress.    Abdominal: Soft. Bowel sounds are normal. He exhibits distension. There is no tenderness.   Lymphadenopathy:     He has no cervical adenopathy.   Neurological: He is alert.   Skin: Skin is warm and dry.   Psychiatric: He has a normal mood and affect. His behavior is normal.   Nursing note and vitals reviewed.      MELD-Na score: 10 at 7/28/2019  3:31 AM  MELD score: 10 at 7/28/2019  3:31 AM  Calculated from:  Serum Creatinine: 0.6 mg/dL (Rounded to 1 mg/dL) at 7/28/2019  3:31 AM  Serum Sodium: 138 mmol/L (Rounded to 137 mmol/L) at 7/28/2019  3:31 AM  Total Bilirubin: 2.8 mg/dL at 7/28/2019  3:31 AM  INR(ratio): 1.0 at 7/26/2019  5:19 PM  Age: 53 years    Significant Labs:  Labs within the past month have been reviewed.    Significant Imaging:  Reviewed    Assessment/Plan:     * Generalized abdominal mass  Mildly elevated AST / /150 and Tbili 2.8.  No clear evidence of underlying liver disease. Mild elevations in liver enzymes and bilirubin due to extrinsic compression from cyst.    - Agree with IR drainage of cyst  - Fluid studies: please send cell count, cultures, cytology, bilirubin, CEA, amoeba serologies / PCR      Thank you for your consult. I will follow-up with patient. Please contact us if you have any additional questions.    Armen Paige MD  Hepatology  [facility]

## 2019-07-29 NOTE — PROCEDURES
Radiology Post-Procedure Note    Pre Op Diagnosis: right hepatic/abdominal cystic lesion    Post Op Diagnosis:  right hepatic/abdominal cystic lesion    Procedure: right hepatic/abdominal cystic lesion drainage    Procedure performed by: Renato Daniels MD; Joe Castañeda MD    Written Informed Consent Obtained: Yes    Specimen Removed: YES, 7 liters dark brown/black fluid    Estimated Blood Loss: Minimal    Findings: US was used for localization of abnormal fluid collection. A needle was inserted into the fluid collection and thin dark brown/black fluid was aspirated.  A wire was inserted into the collection and the tract was dilated.  A 8.0 [place] all-purpose drainage catheter was inserted and a pigtail loop of the distal end was formed.  The catheter was sutured into place and approximately  7 L fluid was removed.     A specimen was sent to the lab for further analysis and culture.    The patient tolerated procedure well and there were no complications. Please see procedure report under Imaging for further details.    Renato Daniels MD  Resident  Department of Radiology  Pager: [phone]

## 2019-07-29 NOTE — PLAN OF CARE
07/29/19 1405   Discharge Assessment   Assessment Type Discharge Planning Assessment   Confirmed/corrected address and phone number on facesheet? Yes   Assessment information obtained from? Patient   Prior to hospitilization cognitive status: Alert/Oriented   Prior to hospitalization functional status: Independent   Current cognitive status: Alert/Oriented   Current Functional Status: Independent   Lives With sibling(s);child(dion), adult   Able to Return to Prior Arrangements yes   Is patient able to care for self after discharge? Yes   Patient's perception of discharge disposition home or selfcare   Equipment Currently Used at Home none   Does the patient have transportation home? Yes   Transportation Anticipated family or friend will provide   Dialysis Name and Scheduled days n/a   Does the patient receive services at the Coumadin Clinic? No   Discharge Plan A Home   Discharge Plan B Home with family   DME Needed Upon Discharge  none   SW met with pt at bedside. He reported he lives with family in a one story house in [place]. His niece will be able to provide transportation home. Pt does not have any insurance. He has never had home health in the past or use DME. Pt also reported he does not use a pharmacy.     Payor: MEDICAID / Plan: PENDING MEDICAID / Product Type: Quantified Skin /       [id number] - [place], [place] - [address] [facility] [address]  [address]  [place] [address]  Phone: [phone] Fax: [phone]      Michela Ordonez LMSW  [facility]  [id number]

## 2019-07-30 LAB
ALBUMIN SERPL BCP-MCNC: 1.3 G/DL (ref 3.5–5.2)
ALP SERPL-CCNC: 673 U/L (ref 55–135)
ALT SERPL W/O P-5'-P-CCNC: 98 U/L (ref 10–44)
ANION GAP SERPL CALC-SCNC: 7 MMOL/L (ref 8–16)
AST SERPL-CCNC: 102 U/L (ref 10–40)
BASOPHILS # BLD AUTO: 0.02 K/UL (ref 0–0.2)
BASOPHILS NFR BLD: 0.2 % (ref 0–1.9)
BILIRUB SERPL-MCNC: 1.4 MG/DL (ref 0.1–1)
BUN SERPL-MCNC: 11 MG/DL (ref 6–20)
CALCIUM SERPL-MCNC: 7.5 MG/DL (ref 8.7–10.5)
CHLORIDE SERPL-SCNC: 105 MMOL/L (ref 95–110)
CO2 SERPL-SCNC: 24 MMOL/L (ref 23–29)
CREAT SERPL-MCNC: 0.6 MG/DL (ref 0.5–1.4)
DIFFERENTIAL METHOD: ABNORMAL
ECHINOCOCCUS AB SER QL IA: NEGATIVE
EOSINOPHIL # BLD AUTO: 0.1 K/UL (ref 0–0.5)
EOSINOPHIL NFR BLD: 1.2 % (ref 0–8)
ERYTHROCYTE [DISTWIDTH] IN BLOOD BY AUTOMATED COUNT: 19.5 % (ref 11.5–14.5)
EST. GFR  (AFRICAN AMERICAN): >60 ML/MIN/1.73 M^2
EST. GFR  (NON AFRICAN AMERICAN): >60 ML/MIN/1.73 M^2
GLUCOSE SERPL-MCNC: 98 MG/DL (ref 70–110)
HCT VFR BLD AUTO: 32.1 % (ref 40–54)
HGB BLD-MCNC: 10 G/DL (ref 14–18)
IMM GRANULOCYTES # BLD AUTO: 0.04 K/UL (ref 0–0.04)
IMM GRANULOCYTES NFR BLD AUTO: 0.5 % (ref 0–0.5)
LIPASE FLD-CCNC: 6 U/L
LYMPHOCYTES # BLD AUTO: 1.5 K/UL (ref 1–4.8)
LYMPHOCYTES NFR BLD: 18.2 % (ref 18–48)
MAGNESIUM SERPL-MCNC: 1.6 MG/DL (ref 1.6–2.6)
MCH RBC QN AUTO: 26.3 PG (ref 27–31)
MCHC RBC AUTO-ENTMCNC: 31.2 G/DL (ref 32–36)
MCV RBC AUTO: 85 FL (ref 82–98)
MONOCYTES # BLD AUTO: 0.5 K/UL (ref 0.3–1)
MONOCYTES NFR BLD: 5.7 % (ref 4–15)
NEUTROPHILS # BLD AUTO: 5.9 K/UL (ref 1.8–7.7)
NEUTROPHILS NFR BLD: 74.2 % (ref 38–73)
NRBC BLD-RTO: 0 /100 WBC
PHOSPHATE SERPL-MCNC: 3.1 MG/DL (ref 2.7–4.5)
PLATELET # BLD AUTO: 408 K/UL (ref 150–350)
PMV BLD AUTO: 11 FL (ref 9.2–12.9)
POTASSIUM SERPL-SCNC: 4.8 MMOL/L (ref 3.5–5.1)
PROT SERPL-MCNC: 5 G/DL (ref 6–8.4)
RBC # BLD AUTO: 3.8 M/UL (ref 4.6–6.2)
SODIUM SERPL-SCNC: 136 MMOL/L (ref 136–145)
SPECIMEN SOURCE: NORMAL
WBC # BLD AUTO: 8.01 K/UL (ref 3.9–12.7)

## 2019-07-30 PROCEDURE — 99233 SBSQ HOSP IP/OBS HIGH 50: CPT | Mod: ,,, | Performed by: HOSPITALIST

## 2019-07-30 PROCEDURE — 99233 PR SUBSEQUENT HOSPITAL CARE,LEVL III: ICD-10-PCS | Mod: ,,, | Performed by: HOSPITALIST

## 2019-07-30 PROCEDURE — 36415 COLL VENOUS BLD VENIPUNCTURE: CPT

## 2019-07-30 PROCEDURE — 80053 COMPREHEN METABOLIC PANEL: CPT

## 2019-07-30 PROCEDURE — 84100 ASSAY OF PHOSPHORUS: CPT

## 2019-07-30 PROCEDURE — 11000001 HC ACUTE MED/SURG PRIVATE ROOM

## 2019-07-30 PROCEDURE — 85025 COMPLETE CBC W/AUTO DIFF WBC: CPT

## 2019-07-30 PROCEDURE — 83735 ASSAY OF MAGNESIUM: CPT

## 2019-07-30 NOTE — ASSESSMENT & PLAN NOTE
-weight gain over the last few years, unsure time-course or amount gained  -CT ABD with large abdominal cystic mass  -IR drained 7L from abd mass.

## 2019-07-30 NOTE — ASSESSMENT & PLAN NOTE
-progressive history of bilateral lower extremity swelling over the last few years with skin tearing present on exam  -likely 2/2 IVC compression via abdominal cystic mass  -baseline doppler with q4 neurovascular checks

## 2019-07-30 NOTE — ASSESSMENT & PLAN NOTE
-increased scrotal swelling over the last 2 weeks  -endorses urinary incontinence over this period as well  -UA with 2+ bilirubin, otherwise normal  -likely 2/2 IVC compression by abdominal mas  -Mass reduced to the point he is able to urinate without issue.

## 2019-07-30 NOTE — ASSESSMENT & PLAN NOTE
-CT ABD/Pelvis showing a large 26 x 25 x 20 cm simple appearing cystic mass which cannot be clearly defined as extrahepatic, compressing common bile duct in displacing all intra-abdominal organs without evidence of GI or  obstruction  - , CEA, and Lipase mildy elevated at 57, 6.5, and 74 respectively  -  WNL  -Gen Surg consulted on 07/26 and recommended urgent drainage by IR with q4 neurovascular checks  -MRCP performed, cystic mass arising from liver    - IR drainage of abscess 7/29; 7L volume removed  - f/u aspirate studies  - hepatology consulted recommend patient f/u outpatient as long as he is medically cleared otherwise   - Agree that elevated liver tests related to mass and liver function is otherwise normal.    - Edema and hypotension also related to mass effect from mass on IVC  - Drain in place for fluid collection

## 2019-07-30 NOTE — PLAN OF CARE
Problem: Adult Inpatient Plan of Care  Goal: Plan of Care Review  Outcome: Ongoing (interventions implemented as appropriate)  Pt. offered no c/o. Skin/Oral care completed. VSS.   Edema is improving. Dressing on Abd is CDI. Drain intact-brown color drainage about 260 ml for this shift.   Safety and pt. care rounds maintained. Wctm.

## 2019-07-30 NOTE — PLAN OF CARE
Problem: Fall Injury Risk  Goal: Absence of Fall and Fall-Related Injury  Outcome: Ongoing (interventions implemented as appropriate)  Pt free from falls and injuries this shift. Pt instructed to call for assistance when ambulating. Bed in lowest position. Bed wheels locked. Side rails up x 2. Call bell within reach.

## 2019-07-30 NOTE — SUBJECTIVE & OBJECTIVE
Interval History: Patient resting comfortably in bed. He is now urinating without issue and his testicles have reduced in swelling. He denies any dizziness or lightheadedness.     Review of Systems   Constitutional: Positive for appetite change and unexpected weight change (weight gain). Negative for chills and fever.   HENT: Negative for nosebleeds and sore throat.    Respiratory: Negative for cough and shortness of breath.    Cardiovascular: Positive for leg swelling. Negative for chest pain.   Gastrointestinal: Positive for abdominal distention. Negative for abdominal pain, blood in stool, constipation, diarrhea, nausea and vomiting.   Genitourinary: Positive for scrotal swelling.   Musculoskeletal: Negative for arthralgias.   Skin: Negative for pallor.   Neurological: Negative for light-headedness and headaches.   Psychiatric/Behavioral: Negative for behavioral problems and confusion.     Objective:     Vital Signs (Most Recent):  Temp: 98.5 °F (36.9 °C) (07/30/19 1524)  Pulse: 79 (07/30/19 1148)  Resp: 17 (07/30/19 1148)  BP: 114/82 (07/30/19 1148)  SpO2: 100 % (07/30/19 1148) Vital Signs (24h Range):  Temp:  [97 °F (36.1 °C)-98.5 °F (36.9 °C)] 98.5 °F (36.9 °C)  Pulse:  [67-79] 79  Resp:  [16-17] 17  SpO2:  [100 %] 100 %  BP: ()/(56-82) 114/82     Weight: 79.8 kg (175 lb 14.8 oz)  Body mass index is 29.28 kg/m².    Intake/Output Summary (Last 24 hours) at 7/30/2019 1552  Last data filed at 7/30/2019 1500  Gross per 24 hour   Intake 530 ml   Output 2600 ml   Net -2070 ml      Physical Exam   Constitutional: He is oriented to person, place, and time. He appears well-developed and well-nourished. No distress.   HENT:   Head: Normocephalic and atraumatic.   Eyes: EOM are normal. No scleral icterus.   Neck: Normal range of motion.   Cardiovascular: Normal rate and normal heart sounds.   Pulmonary/Chest: Effort normal and breath sounds normal. No respiratory distress.   Abdominal: Bowel sounds are normal. He  exhibits distension and mass. There is tenderness. There is no rebound and no guarding.   Enlarged and distended abdomen.    Genitourinary:   Genitourinary Comments: Enlarged scrotum more on the L side than R   Musculoskeletal: Normal range of motion.   Bilateral lower extremity edema    Neurological: He is alert and oriented to person, place, and time.   Skin: Skin is warm and dry.   Psychiatric: He has a normal mood and affect. His behavior is normal.       Significant Labs:   CBC:   Recent Labs   Lab 07/29/19  0615 07/30/19  0305   WBC 8.46 8.01   HGB 11.1* 10.0*   HCT 35.4* 32.1*   * 408*     CMP:   Recent Labs   Lab 07/29/19 0615 07/30/19  0305   * 136   K 4.5 4.8    105   CO2 25 24    98   BUN 12 11   CREATININE 0.6 0.6   CALCIUM 8.1* 7.5*   PROT 6.0 5.0*   ALBUMIN 1.7* 1.3*   BILITOT 3.1* 1.4*   ALKPHOS 885* 673*   * 102*   * 98*   ANIONGAP 7* 7*   EGFRNONAA >60.0 >60.0       Significant Imaging: I have reviewed all pertinent imaging results/findings within the past 24 hours.

## 2019-07-30 NOTE — PROGRESS NOTES
[facility] Hospital Medicine  Progress Note    Patient Name: Kishan Sánchez  MRN: [id number]  Patient Class: IP- Inpatient   Admission Date: 7/26/2019  Length of Stay: 4 days  Attending Physician: Janie Hampton MD  Primary Care Provider: Primary Doctor [facility] Medicine Team: [facility] HOSP MED 2 Tito Bob MD    Subjective:     Principal Problem:Generalized abdominal mass      HPI:  Patient is a 54 yo M with no PMH, has not seen a doctor in his life, presents with an increasing abdomen and scrotum. It is unknown exactly when this all started as the patient is a poor historian (most of the history was obtained via his sister and niece), however, it seems like this has been ongoing for the last few years. He has had progressive distension of his abdomen for the last few years, without any pain. Two weeks ago he started to have some chest pain so he decided to go to [facility], from which he was discharged with a follow up CT Abd appointment. Today he went to his CT Abd appointment, and when he got home, his niece noticed that he had an enlarged scrotum while he was using the bathroom, so insisted that he come to [facility] in [place]. He also has had urinary incontinence starting 2 weeks ago which has progressively gotten worse, now wearing diapers. Also, over these past few years of progressive abdominal distension, he has had progressive leg swelling bilaterally. He endorses weight gain but does not know how much. He denies fevers, chills, chest pain, SOB. He denies any changes in his bowel movements and states that his last one was today which was normal. He denies N/V.    Denies ever seeing a doctor. Denies any PMH and PSH.    He works as a  and lives with his sister. Denies tobacco and IV drug use as well as drugs not prescribed to him. He denies any history of blood transfusions. Endorses remote history of drinking 2 beers daily, but stopped in 1989. His mother  passed away from ovarian cancer and his father from lung cancer. Otherwise, family history is unknown.    CT ABD/Pelvis showing a large 26 x 25 x 20 cm simple appearing cystic mass which cannot be clearly defined as extrahepatic, compressing common bile duct in displacing all intra-abdominal organs without evidence of GI or  obstruction.    In the ED, VSS, WBC 8.4, Alk P 918, , , troponin 0.006.     Overview/Hospital Course:  Patient was admitted to hospital medicine on 07/26 for abdominal distension and pain 2/2 cystic abdominal mass. A CEA was ordered and came back elevated at 6.5 and a  was ordered and was elevated at 57. Lipase was ordered and was mildly elevated at 74. UA showed 2+ bilirubin but was otherwise normal and non-concerning for UTI. On 07/27, General Surgery was consulted and recommended that IR place a drain urgently, due to concerns for LE edema. Neurovascular checks were scheduled q4 and IR was consulted who scheduled him for drainage on Monday, 07/29. A Ca 19-9 was ordered which resulted as being low. On 07/28, IR recommended a MRCP and hepatology consult. MRCP was revealing of cystic mass arising from the liver. Hepatology was consulted. He was started on a regular diet, as he was not complaining of any difficulties swallowing or any trouble eating/passing food.     IR drainage of abscess 7/29; 7L fluid removed. Aspirate studies pending.   7/30: Awaiting cytology; discussed with hepatology that he may follow up with results outpatient     Interval History: Patient resting comfortably in bed. He is now urinating without issue and his testicles have reduced in swelling. He denies any dizziness or lightheadedness.     Review of Systems   Constitutional: Positive for appetite change and unexpected weight change (weight gain). Negative for chills and fever.   HENT: Negative for nosebleeds and sore throat.    Respiratory: Negative for cough and shortness of breath.     Cardiovascular: Positive for leg swelling. Negative for chest pain.   Gastrointestinal: Positive for abdominal distention. Negative for abdominal pain, blood in stool, constipation, diarrhea, nausea and vomiting.   Genitourinary: Positive for scrotal swelling.   Musculoskeletal: Negative for arthralgias.   Skin: Negative for pallor.   Neurological: Negative for light-headedness and headaches.   Psychiatric/Behavioral: Negative for behavioral problems and confusion.     Objective:     Vital Signs (Most Recent):  Temp: 98.5 °F (36.9 °C) (07/30/19 1524)  Pulse: 79 (07/30/19 1148)  Resp: 17 (07/30/19 1148)  BP: 114/82 (07/30/19 1148)  SpO2: 100 % (07/30/19 1148) Vital Signs (24h Range):  Temp:  [97 °F (36.1 °C)-98.5 °F (36.9 °C)] 98.5 °F (36.9 °C)  Pulse:  [67-79] 79  Resp:  [16-17] 17  SpO2:  [100 %] 100 %  BP: ()/(56-82) 114/82     Weight: 79.8 kg (175 lb 14.8 oz)  Body mass index is 29.28 kg/m².    Intake/Output Summary (Last 24 hours) at 7/30/2019 1552  Last data filed at 7/30/2019 1500  Gross per 24 hour   Intake 530 ml   Output 2600 ml   Net -2070 ml      Physical Exam   Constitutional: He is oriented to person, place, and time. He appears well-developed and well-nourished. No distress.   HENT:   Head: Normocephalic and atraumatic.   Eyes: EOM are normal. No scleral icterus.   Neck: Normal range of motion.   Cardiovascular: Normal rate and normal heart sounds.   Pulmonary/Chest: Effort normal and breath sounds normal. No respiratory distress.   Abdominal: Bowel sounds are normal. He exhibits distension and mass. There is tenderness. There is no rebound and no guarding.   Enlarged and distended abdomen.    Genitourinary:   Genitourinary Comments: Enlarged scrotum more on the L side than R   Musculoskeletal: Normal range of motion.   Bilateral lower extremity edema    Neurological: He is alert and oriented to person, place, and time.   Skin: Skin is warm and dry.   Psychiatric: He has a normal mood and  affect. His behavior is normal.       Significant Labs:   CBC:   Recent Labs   Lab 07/29/19  0615 07/30/19  0305   WBC 8.46 8.01   HGB 11.1* 10.0*   HCT 35.4* 32.1*   * 408*     CMP:   Recent Labs   Lab 07/29/19  0615 07/30/19  0305   * 136   K 4.5 4.8    105   CO2 25 24    98   BUN 12 11   CREATININE 0.6 0.6   CALCIUM 8.1* 7.5*   PROT 6.0 5.0*   ALBUMIN 1.7* 1.3*   BILITOT 3.1* 1.4*   ALKPHOS 885* 673*   * 102*   * 98*   ANIONGAP 7* 7*   EGFRNONAA >60.0 >60.0       Significant Imaging: I have reviewed all pertinent imaging results/findings within the past 24 hours.      Assessment/Plan:      * Generalized abdominal mass  -CT ABD/Pelvis showing a large 26 x 25 x 20 cm simple appearing cystic mass which cannot be clearly defined as extrahepatic, compressing common bile duct in displacing all intra-abdominal organs without evidence of GI or  obstruction  - , CEA, and Lipase mildy elevated at 57, 6.5, and 74 respectively  -  WNL  -Gen Surg consulted on 07/26 and recommended urgent drainage by IR with q4 neurovascular checks  -MRCP performed, cystic mass arising from liver    - IR drainage of abscess 7/29; 7L volume removed  - f/u aspirate studies  - hepatology consulted recommend patient f/u outpatient as long as he is medically cleared otherwise   - Agree that elevated liver tests related to mass and liver function is otherwise normal.    - Edema and hypotension also related to mass effect from mass on IVC  - Drain in place for fluid collection     Bilateral lower extremity edema  -progressive history of bilateral lower extremity swelling over the last few years with skin tearing present on exam  -likely 2/2 IVC compression via abdominal cystic mass  -baseline doppler with q4 neurovascular checks    Scrotal edema  -increased scrotal swelling over the last 2 weeks  -endorses urinary incontinence over this period as well  -UA with 2+ bilirubin, otherwise  normal  -likely 2/2 IVC compression by abdominal mas  -Mass reduced to the point he is able to urinate without issue.     Weight gain  -weight gain over the last few years, unsure time-course or amount gained  -CT ABD with large abdominal cystic mass  -IR drained 7L from abd mass.    VTE Risk Mitigation (From admission, onward)        Ordered     IP VTE LOW RISK PATIENT  Once      07/27/19 1012     Place sequential compression device  Until discontinued      07/26/19 1946                Tito Bob MD  Department of Hospital Medicine   [facility]

## 2019-07-31 VITALS
TEMPERATURE: 99 F | HEART RATE: 82 BPM | WEIGHT: 175.94 LBS | OXYGEN SATURATION: 100 % | RESPIRATION RATE: 16 BRPM | SYSTOLIC BLOOD PRESSURE: 92 MMHG | DIASTOLIC BLOOD PRESSURE: 57 MMHG | HEIGHT: 65 IN | BODY MASS INDEX: 29.31 KG/M2

## 2019-07-31 LAB
ALBUMIN SERPL BCP-MCNC: 1.3 G/DL (ref 3.5–5.2)
ALP SERPL-CCNC: 562 U/L (ref 55–135)
ALT SERPL W/O P-5'-P-CCNC: 70 U/L (ref 10–44)
ANION GAP SERPL CALC-SCNC: 6 MMOL/L (ref 8–16)
AST SERPL-CCNC: 55 U/L (ref 10–40)
BASOPHILS # BLD AUTO: 0.03 K/UL (ref 0–0.2)
BASOPHILS NFR BLD: 0.4 % (ref 0–1.9)
BILIRUB FLD-MCNC: 0.5 MG/DL
BILIRUB SERPL-MCNC: 1.1 MG/DL (ref 0.1–1)
BODY FLUID SOURCE, BILIRUBIN: NORMAL
BUN SERPL-MCNC: 10 MG/DL (ref 6–20)
CALCIUM SERPL-MCNC: 7.4 MG/DL (ref 8.7–10.5)
CHLORIDE SERPL-SCNC: 107 MMOL/L (ref 95–110)
CO2 SERPL-SCNC: 25 MMOL/L (ref 23–29)
CREAT SERPL-MCNC: 0.5 MG/DL (ref 0.5–1.4)
DIFFERENTIAL METHOD: ABNORMAL
EOSINOPHIL # BLD AUTO: 0.1 K/UL (ref 0–0.5)
EOSINOPHIL NFR BLD: 1.1 % (ref 0–8)
ERYTHROCYTE [DISTWIDTH] IN BLOOD BY AUTOMATED COUNT: 19.3 % (ref 11.5–14.5)
EST. GFR  (AFRICAN AMERICAN): >60 ML/MIN/1.73 M^2
EST. GFR  (NON AFRICAN AMERICAN): >60 ML/MIN/1.73 M^2
GLUCOSE SERPL-MCNC: 127 MG/DL (ref 70–110)
HCT VFR BLD AUTO: 31.2 % (ref 40–54)
HGB BLD-MCNC: 9.9 G/DL (ref 14–18)
IMM GRANULOCYTES # BLD AUTO: 0.05 K/UL (ref 0–0.04)
IMM GRANULOCYTES NFR BLD AUTO: 0.7 % (ref 0–0.5)
LYMPHOCYTES # BLD AUTO: 1.2 K/UL (ref 1–4.8)
LYMPHOCYTES NFR BLD: 15.4 % (ref 18–48)
MAGNESIUM SERPL-MCNC: 1.4 MG/DL (ref 1.6–2.6)
MCH RBC QN AUTO: 26.7 PG (ref 27–31)
MCHC RBC AUTO-ENTMCNC: 31.7 G/DL (ref 32–36)
MCV RBC AUTO: 84 FL (ref 82–98)
MONOCYTES # BLD AUTO: 0.5 K/UL (ref 0.3–1)
MONOCYTES NFR BLD: 6.9 % (ref 4–15)
NEUTROPHILS # BLD AUTO: 5.7 K/UL (ref 1.8–7.7)
NEUTROPHILS NFR BLD: 75.5 % (ref 38–73)
NRBC BLD-RTO: 0 /100 WBC
PHOSPHATE SERPL-MCNC: 2.7 MG/DL (ref 2.7–4.5)
PLATELET # BLD AUTO: 360 K/UL (ref 150–350)
PMV BLD AUTO: 10.4 FL (ref 9.2–12.9)
POTASSIUM SERPL-SCNC: 4.1 MMOL/L (ref 3.5–5.1)
PROT SERPL-MCNC: 4.6 G/DL (ref 6–8.4)
RBC # BLD AUTO: 3.71 M/UL (ref 4.6–6.2)
SODIUM SERPL-SCNC: 138 MMOL/L (ref 136–145)
WBC # BLD AUTO: 7.59 K/UL (ref 3.9–12.7)

## 2019-07-31 PROCEDURE — 85025 COMPLETE CBC W/AUTO DIFF WBC: CPT

## 2019-07-31 PROCEDURE — 99239 HOSP IP/OBS DSCHRG MGMT >30: CPT | Mod: ,,, | Performed by: HOSPITALIST

## 2019-07-31 PROCEDURE — 82247 BILIRUBIN TOTAL: CPT

## 2019-07-31 PROCEDURE — 99239 PR HOSPITAL DISCHARGE DAY,>30 MIN: ICD-10-PCS | Mod: ,,, | Performed by: HOSPITALIST

## 2019-07-31 PROCEDURE — 87070 CULTURE OTHR SPECIMN AEROBIC: CPT

## 2019-07-31 PROCEDURE — 83735 ASSAY OF MAGNESIUM: CPT

## 2019-07-31 PROCEDURE — 80053 COMPREHEN METABOLIC PANEL: CPT

## 2019-07-31 PROCEDURE — 87075 CULTR BACTERIA EXCEPT BLOOD: CPT

## 2019-07-31 PROCEDURE — 82378 CARCINOEMBRYONIC ANTIGEN: CPT

## 2019-07-31 PROCEDURE — 36415 COLL VENOUS BLD VENIPUNCTURE: CPT

## 2019-07-31 PROCEDURE — 84100 ASSAY OF PHOSPHORUS: CPT

## 2019-07-31 NOTE — PLAN OF CARE
Problem: Adult Inpatient Plan of Care  Goal: Plan of Care Review  Outcome: Ongoing (interventions implemented as appropriate)  Pt VSS. Free from falls and injuries this shift. No complaints of pain. Edema noted to lower extremities.Will continue to monitor.

## 2019-07-31 NOTE — DISCHARGE SUMMARY
[facility] Hospital Medicine  Discharge Summary      Patient Name: Kishan Sánchez  MRN: [id number]  Admission Date: 7/26/2019  Hospital Length of Stay: 5 days  Discharge Date and Time:  07/31/2019 11:10 AM  Attending Physician: Janie Hampton MD   Discharging Provider: Tito Bob MD  Primary Care Provider: Primary Doctor [facility] Medicine Team: [facility] MED 2 Tito Bob MD    HPI:   Patient is a 52 yo M with no PMH, has not seen a doctor in his life, presents with an increasing abdomen and scrotum. It is unknown exactly when this all started as the patient is a poor historian (most of the history was obtained via his sister and niece), however, it seems like this has been ongoing for the last few years. He has had progressive distension of his abdomen for the last few years, without any pain. Two weeks ago he started to have some chest pain so he decided to go to [facility], from which he was discharged with a follow up CT Abd appointment. Today he went to his CT Abd appointment, and when he got home, his niece noticed that he had an enlarged scrotum while he was using the bathroom, so insisted that he come to [facility] in [place]. He also has had urinary incontinence starting 2 weeks ago which has progressively gotten worse, now wearing diapers. Also, over these past few years of progressive abdominal distension, he has had progressive leg swelling bilaterally. He endorses weight gain but does not know how much. He denies fevers, chills, chest pain, SOB. He denies any changes in his bowel movements and states that his last one was today which was normal. He denies N/V.    Denies ever seeing a doctor. Denies any PMH and PSH.    He works as a  and lives with his sister. Denies tobacco and IV drug use as well as drugs not prescribed to him. He denies any history of blood transfusions. Endorses remote history of drinking 2 beers daily, but stopped in 1989. His  mother passed away from ovarian cancer and his father from lung cancer. Otherwise, family history is unknown.    CT ABD/Pelvis showing a large 26 x 25 x 20 cm simple appearing cystic mass which cannot be clearly defined as extrahepatic, compressing common bile duct in displacing all intra-abdominal organs without evidence of GI or  obstruction.    In the ED, VSS, WBC 8.4, Alk P 918, , , troponin 0.006.     * No surgery found *      Hospital Course:   Patient was admitted to hospital medicine on 07/26 for abdominal distension and pain 2/2 cystic abdominal mass. A CEA was ordered and came back elevated at 6.5 and a  was ordered and was elevated at 57. Lipase was ordered and was mildly elevated at 74. UA showed 2+ bilirubin but was otherwise normal and non-concerning for UTI. On 07/27, General Surgery was consulted and recommended that IR place a drain urgently, due to concerns for LE edema. Neurovascular checks were scheduled q4 and IR was consulted who scheduled him for drainage on Monday, 07/29. A Ca 19-9 was ordered which resulted as being low. On 07/28, IR recommended a MRCP and hepatology consult. MRCP was revealing of cystic mass arising from the liver. Hepatology was consulted. He was started on a regular diet, as he was not complaining of any difficulties swallowing or any trouble eating/passing food.     IR drainage of abscess 7/29; 7L fluid removed. Aspirate studies pending.   7/30: Awaiting cytology; discussed with hepatology that he may follow up with results outpatient   7/31:  D/C with no home medications and F/U with hepatology      Review of Systems   Constitutional: Negative for chills and fever.   HENT: Negative for ear pain, hearing loss and tinnitus.    Eyes: Negative for blurred vision and photophobia.   Respiratory: Negative for cough, hemoptysis and shortness of breath.    Cardiovascular: Positive for leg swelling. Negative for chest pain and palpitations.    Gastrointestinal: Negative for nausea and vomiting.   Genitourinary: Negative for dysuria, frequency and urgency.   Neurological: Negative for dizziness and headaches.     Physical Exam   Constitutional: He is oriented to person, place, and time. He appears well-developed and well-nourished. No distress.   HENT:   Head: Normocephalic and atraumatic.   Eyes: Pupils are equal, round, and reactive to light. EOM are normal. No scleral icterus.   Cardiovascular: Normal rate, regular rhythm and normal heart sounds.   No murmur heard.  Pulmonary/Chest: Effort normal and breath sounds normal. No respiratory distress.   Abdominal: Bowel sounds are normal.   Mass noted along the right flank.   Genitourinary:   Genitourinary Comments: B/L scrotal swelling   Neurological: He is alert and oriented to person, place, and time.   Skin: Skin is warm and dry. He is not diaphoretic.   B/L LE pitting edema        Consults:   Consults (From admission, onward)        Status Ordering Provider     Inpatient consult to General Surgery  Once     Provider:  (Not yet assigned)    Completed MICKEY KHAN     Inpatient consult to Hepatology  Once     Provider:  (Not yet assigned)    Completed MICKEY KHAN     Inpatient consult to Interventional Radiology  Once     Provider:  (Not yet assigned)    Completed MICKEY KHAN          No new Assessment & Plan notes have been filed under this hospital service since the last note was generated.  Service: Hospital Medicine    Final Active Diagnoses:    Diagnosis Date Noted POA    PRINCIPAL PROBLEM:  Generalized abdominal mass [R19.07] 07/26/2019 Yes    Alteration in skin integrity [R23.9] 07/29/2019 Yes    Weight gain [R63.5] 07/26/2019 Unknown    Scrotal edema [N50.89] 07/26/2019 Unknown    Bilateral lower extremity edema [R60.0] 07/26/2019 Unknown      Problems Resolved During this Admission:       Discharged Condition: stable    Disposition: Home or Self Care    Follow Up:  Follow-up  Information     Schedule an appointment as soon as possible for a visit with Bird Caldera - Hepatology.    Specialty:  Hepatology  Contact information:  [address]  [facility] [address] [phone]  Additional information:  1st Floor - [facility], located by clinic tower elevators               Patient Instructions:      Ambulatory Referral to Hepatology   Referral Priority: Routine Referral Type: Consultation   Referral Reason: Specialty Services Required   Requested Specialty: Hepatology   Number of Visits Requested: 1     Diet Adult Regular     Notify your health care provider if you experience any of the following:  temperature >100.4     Notify your health care provider if you experience any of the following:  persistent nausea and vomiting or diarrhea     Notify your health care provider if you experience any of the following:  severe uncontrolled pain     Notify your health care provider if you experience any of the following:  redness, tenderness, or signs of infection (pain, swelling, redness, odor or green/yellow discharge around incision site)     Notify your health care provider if you experience any of the following:  persistent dizziness, light-headedness, or visual disturbances     Activity as tolerated       Significant Diagnostic Studies: Microbiology: Cytology Results from drain will need to be evaluated outpatient     Pending Diagnostic Studies:     Procedure Component Value Units Date/Time    Anti-Ent. Histolytica Ab [[id number]] Collected:  07/29/19 1319    Order Status:  Sent Lab Status:  In process Updated:  07/29/19 1345    Specimen:  Blood     Narrative:       Collection has been rescheduled by TS2 at 07/29/2019 13:06 Reason:   unable to collect . spoke to john Golden    Bilirubin, Peritoneal, Pleural Fluid or SHELBI Drainage, In-House Peritoneal Fluid [[id number]] Collected:  07/31/19 0950    Order Status:  Sent Lab Status:  No result     Specimen:  Body  Fluid     CEA, Pancreatic Cyst Fluid [[id number]] Collected:  07/31/19 0945    Order Status:  Sent Lab Status:  In process Updated:  07/31/19 0947    Specimen:  Body Fluid from Pancreatic Fluid     Freeze and Hold,  [[id number]] Collected:  07/29/19 1043    Order Status:  Sent Lab Status:  No result     Specimen:  Body Fluid from Peritoneal Fluid          Medications:  Reconciled Home Medications:      Medication List      You have not been prescribed any medications.         Indwelling Lines/Drains at time of discharge:   Lines/Drains/Airways     Drain                 Closed/Suction Drain 07/29/19 1037 Right Abdomen Bulb 8 Fr. 2 days            Patient is instructed on how to drain at home    Time spent on the discharge of patient: 45 minutes  Patient was seen and examined on the date of discharge and determined to be suitable for discharge.         Tito Bob MD  Department of Hospital Medicine  [facility]

## 2019-07-31 NOTE — NURSING
DC was pending due to f/u apt.   MD informed that OK to DC pt. as f/u apt. is confirmed.   MD made aware of drain o/p (about 1300 ml so far) and BP.   Pt. is asymptotic. OK to DC.   AVS reviewed. Drain teaching/emptying completed.   Pt. demonstrated the drain care.   Pts. sister notified by this RN about the DC and for a ride home. She is coming from [place]-will come around 7 pm. Luiz.

## 2019-08-01 LAB — E HISTOLYT AB SER IA-ACNC: NEGATIVE

## 2019-08-02 ENCOUNTER — PATIENT OUTREACH (OUTPATIENT)
Dept: ADMINISTRATIVE | Facility: CLINIC | Age: 54
End: 2019-08-02

## 2019-08-02 LAB
BACTERIA SPEC AEROBE CULT: NORMAL
MAYO MISCELLANEOUS RESULT (REF): NORMAL

## 2019-08-02 NOTE — PATIENT INSTRUCTIONS
Abscess Drainage  An abscess is a pocket of pus that forms around an infection. Pus is a fluid made up of germs (bacteria), white blood cells, and other matter. Draining pus from an infected area or organ inside the body may be needed. This helps heal the infection. The procedure is usually done by a specially trained doctor called an interventional radiologist.  How do I get ready for abscess drainage?  Follow any instructions you are given on how to prepare, including:  Don't eat or drink anything for 6 hours before the procedure.  Tell the technologist if you are, or could be, pregnant or if you are breastfeeding.  Tell your healthcare provider and the technologist if you are allergic to X-ray dye (contrast medium) or other medicines.  Be sure your healthcare provider knows about any health conditions you have and all medicines you take. You may be told to stop taking some or all of them before the test. This includes:  All prescription medicines  Over-the-counter medicines that dont need a prescription  Any street drugs you may use   Herbs, vitamins, kelp, seaweed, cough syrups, and any other supplements  What happens during abscess drainage?  You will change into a hospital gown and lie on an X-ray table. You may lie on your back, front, or side, depending on the site of the abscess.  An IV line is put into your vein to give you fluids and medicines. You may be given medicine through the IV to help you relax.  The skin over the abscess is cleaned. A local anesthetic is applied to numb the skin.  Using CT scan, X-ray, or ultrasound images as a guide, the radiologist puts a needle through the skin and guides it to the abscess. The needle is then replaced with a thin, flexible tube (catheter).  Pus drains from the abscess through the catheter. A bag or suction bulb will be attached to the catheter to hold the pus as it drains.  The drainage catheter may be temporarily sutured or taped to your skin to help secure  "it and prevent it from moving.  The entire procedure may take 30 minutes or longer, depending on the location of the abscess.  What happens after abscess drainage?  A slight fever is normal for the first 24 hours after the procedure.  The catheter and drainage bag will likely remain in place for several days. Follow any instructions you are given for caring for the catheter and drainage site.  See your healthcare provider for a follow-up appointment to check the infection and to have the catheter removed.  When to call the healthcare provider  Call your healthcare provider if you have:  Bleeding  Fever of 100.4°F (38°C) or higher, or as directed by your healthcare provider  New or worsening pain  Fluid stops draining from the tube, the drainage changes color, or the tube moves or comes out   © 0212-8967 [employer]. [address] [place] [address], [place] [address]. All rights reserved. This information is not intended as a substitute for professional medical care. Always follow your healthcare professional's instructions.   Caring for a Closed Suction Drainage Tube  A drainage tube removes fluid from around an incision. This helps prevent infection and promotes healing. The collection bulb at the end of the tube is squeezed and plugged to create suction. The bulb should be emptied and reset when half full to maintain adequate suction. You need to empty the bulb and clean the skin around the drain as often as your healthcare provider tells you to. Follow the steps below.     What youll need  Have the following items ready:   Disposable gloves   Measuring cup   Record sheet   Gauze or paper towel   Sterile cotton swabs or 4" x 4" gauze pads   Sterile saline or soap and water       Step 1. Empty the bulb   Wash your hands and put on a new pair of disposable gloves.   Point the top of the bulb away from you and remove the stopper.   Turn the bulb upside down over a measuring cup. Squeeze the fluid into " "the cup. Make sure the bulb is totally empty.   Put the cup to one side. You can record the volume of liquid in the cup after you clean and reconnect the bulb in step 2.    Step 2. Clean and reconnect the bulb   Clean the top of the bulb with clean gauze or a paper towel, if needed.   Squeeze the bulb tight, and put the stopper back on the top.   Record the amount of fluid in the cup. Then, empty the cup as directed.    Step 3. Clean the site   Remove your disposable gloves and wash your hands before cleaning the site.   Put on a new pair of disposable gloves.   Wet a sterile cotton swab or 4" x 4" gauze pad with sterile saline or soap and water.   Gently clean the skin around the drain. Always wipe away from the incision.   Apply an antibacterial ointment if directed.   When to call your healthcare provider  Call your healthcare provider if you notice any of these changes:   The amount of fluid increases or decreases suddenly   Large amount of blood or a clot in drainage   Color, odor, or thickness of the fluid changes   Tube falls out or the incision opens   Skin around the drain is red, swollen, painful, or seeping pus   You have a fever of 100.4°F (38°C) or higher, or as directed by your healthcare provider     If the tube isn't draining  Here are tips to drain the tube:   Uncurl any kinks in the tube.   With one hand, firmly hold the base of the tube between your thumb and index finger. Do not touch the incision.   Put the thumb and index finger of your other hand on the tube, next to the first hand. Pinch your fingers together. Then pull them along the tube toward the bag. This will help push any clogged fluid through the tube. This is called "stripping the tube." You may find it helpful to hold an alcohol swab between your fingers and the tube to lubricate the tubing.   If the tube still does not drain, call your healthcare provider.   © 7065-7993 [employer]. [address] [place] [address] " [place] [address]. All rights reserved. This information is not intended as a substitute for professional medical care. Always follow your healthcare professional's instructions.

## 2019-08-05 LAB — BACTERIA SPEC ANAEROBE CULT: NORMAL

## 2019-08-09 ENCOUNTER — TELEPHONE (OUTPATIENT)
Dept: HEPATOLOGY | Facility: CLINIC | Age: 54
End: 2019-08-09

## 2019-08-09 ENCOUNTER — DOCUMENTATION ONLY (OUTPATIENT)
Dept: TRANSPLANT | Facility: CLINIC | Age: 54
End: 2019-08-09

## 2019-08-09 NOTE — TELEPHONE ENCOUNTER
----- Message from Brittnee Brenner LPN sent at 8/9/2019  8:50 AM CDT -----  Patient needs post discharge appoinrtment   Pt will be referred to Hepatology.  Mildly elevated AST / /150 and Tbili 2.8.  No clear evidence of underlying liver disease. Mild elevations in liver enzymes and bilirubin due to extrinsic compression from cyst  Initial referral received  from the workque.   Referring Provider/diagnosis  JOSE PATEL

## 2019-08-09 NOTE — TELEPHONE ENCOUNTER
Ma CALLED patient spoke to patient sister schedule patient initial visit to see liver specialist she accepted 9/9/19 mailed appt reminder to patient.

## 2019-08-09 NOTE — LETTER
August 9, 2019    Kishan Sánchez  [address] [facility] [address]      Dear Kishan Sánchez:    Your doctor has referred you to the [facility]. We are sending this letter to remind you to make an appointment with us to complete the referral process.     Please call us at [phone] to schedule an appointment. We look forward to seeing you soon.     If you received a call and have been scheduled, please disregard this letter.       Sincerely,        [facility]   [address] [place] [id number]  [phone]

## 2019-08-09 NOTE — NURSING
Pt records reviewed.   Pt will be referred to Hepatology.  Mildly elevated AST / /150 and Tbili 2.8.  No clear evidence of underlying liver disease. Mild elevations in liver enzymes and bilirubin due to extrinsic compression from cyst  Initial referral received  from the workque.   Referring Provider/diagnosis  JOSE PATEL      Referral letter sent to patient.

## 2019-08-12 PROBLEM — Z09 HOSPITAL DISCHARGE FOLLOW-UP: Status: ACTIVE | Noted: 2019-08-12

## 2019-08-19 ENCOUNTER — OFFICE VISIT (OUTPATIENT)
Dept: SURGERY | Facility: CLINIC | Age: 54
End: 2019-08-19
Payer: MEDICAID

## 2019-08-19 VITALS
HEIGHT: 65 IN | WEIGHT: 133.69 LBS | HEART RATE: 117 BPM | DIASTOLIC BLOOD PRESSURE: 71 MMHG | BODY MASS INDEX: 22.27 KG/M2 | SYSTOLIC BLOOD PRESSURE: 106 MMHG

## 2019-08-19 DIAGNOSIS — K65.1 INTRA-ABDOMINAL ABSCESS: Primary | ICD-10-CM

## 2019-08-19 PROCEDURE — 99999 PR PBB SHADOW E&M-EST. PATIENT-LVL III: CPT | Mod: PBBFAC,,, | Performed by: SURGERY

## 2019-08-19 PROCEDURE — 99214 PR OFFICE/OUTPT VISIT, EST, LEVL IV, 30-39 MIN: ICD-10-PCS | Mod: S$PBB,,, | Performed by: SURGERY

## 2019-08-19 PROCEDURE — 99214 OFFICE O/P EST MOD 30 MIN: CPT | Mod: S$PBB,,, | Performed by: SURGERY

## 2019-08-19 PROCEDURE — 99999 PR PBB SHADOW E&M-EST. PATIENT-LVL III: ICD-10-PCS | Mod: PBBFAC,,, | Performed by: SURGERY

## 2019-08-19 PROCEDURE — 99213 OFFICE O/P EST LOW 20 MIN: CPT | Mod: PBBFAC | Performed by: SURGERY

## 2019-08-19 NOTE — LETTER
August 26, 2019      Ayana Julian MD  [address] [facility] [address]           [facility]  [address]  [facility] [address]  Phone: [phone]          Patient: Kishan Sánchez   MR Number: [id number]   YOB: 1965   Date of Visit: 8/19/2019       Dear Dr. Ayana Julian:    Thank you for referring Kishan Sánchez to me for evaluation. Attached you will find relevant portions of my assessment and plan of care.    If you have questions, please do not hesitate to call me. I look forward to following Kishan Sánchez along with you.    Sincerely,    Byron Morley MD    Enclosure  CC:  No Recipients    If you would like to receive this communication electronically, please contact [email or web address] or [phone] to request more information on [employer] access.    For providers and/or their staff who would like to refer a patient to [facility], please contact us through our one-stop-shop provider referral line, [facility] , at [phone].    If you feel you have received this communication in error or would no longer like to receive these types of communications, please e-mail [email or web address]

## 2019-08-19 NOTE — MEDICAL/APP STUDENT
LOS: 0    HPI: Kishan Sánchez is a 54 y.o. male with no stated PMHx but complains of multiple year history of abdo and b/l leg swelling presents to clinic today for a surgical consult.  Pt originally presented to [facility] ED early July complaining of chest pain.  He was given an outpatient CT appointment and discharged.  He underwent the CT, and upon arriving home that day, noticed significant scrotal swelling and went to [facility] ED.  He was admitted from the ED for paracentesis and monitoring.  Discharged from hospital 7/31 with drain placed by IR.  Drains 50mL (?) daily, pt is poor historian.      CT abdo/pelvis shows a large 26 x 25 x 20 cm simple appearing cystic mass which cannot be clearly defined as extrahepatic, compressing common bile duct in displacing all intra-abdominal organs without evidence of GI or  obstruction.    No past medical history on file.     No family history on file.     Social History     Socioeconomic History    Marital status: Single     Spouse name: Not on file    Number of children: Not on file    Years of education: Not on file    Highest education level: Not on file   Occupational History    Not on file   Social Needs    Financial resource strain: Not on file    Food insecurity:     Worry: Not on file     Inability: Not on file    Transportation needs:     Medical: Not on file     Non-medical: Not on file   Tobacco Use    Smoking status: Never Smoker    Smokeless tobacco: Never Used   Substance and Sexual Activity    Alcohol use: Not Currently     Comment: quick drinking 6 years ago    Drug use: Never    Sexual activity: Not Currently     Partners: Female   Lifestyle    Physical activity:     Days per week: Not on file     Minutes per session: Not on file    Stress: Not on file   Relationships    Social connections:     Talks on phone: Not on file     Gets together: Not on file     Attends Congregation service: Not on file     Active member of club or  "organization: Not on file     Attends meetings of clubs or organizations: Not on file     Relationship status: Not on file   Other Topics Concern    Not on file   Social History Narrative    Not on file        Review of Systems   Constitutional: Positive for weight loss ("from fluid"). Negative for chills, fever and malaise/fatigue.   HENT: Negative.    Eyes: Negative.    Respiratory: Negative.    Cardiovascular: Negative.    Gastrointestinal: Negative for abdominal pain, nausea and vomiting.   Genitourinary: Negative for dysuria, frequency and urgency.   Musculoskeletal: Negative.    Skin: Negative.    Neurological: Negative.    Endo/Heme/Allergies: Negative.    Psychiatric/Behavioral: Negative.        Physical Exam   Constitutional: He is oriented to person, place, and time.       HENT:   Head: Normocephalic and atraumatic.   Eyes: Pupils are equal, round, and reactive to light. Conjunctivae and EOM are normal.   Pulmonary/Chest: Effort normal.   Abdominal: He exhibits distension. There is no tenderness. There is no rebound.   Neurological: He is alert and oriented to person, place, and time.   Skin: Skin is warm and dry.            ASSESSMENT/PLAN: Kishan Snáchez is a 54 y.o. male with abdo distention and b/l leg swelling with findings of large cystic mass found on CT (prior to IR drain placement).     Will order another CT, F/U in clinic with results.     "

## 2019-08-20 DIAGNOSIS — K76.89 LIVER CYST: Primary | ICD-10-CM

## 2019-08-27 PROBLEM — K76.89 HEPATIC CYST: Status: ACTIVE | Noted: 2019-08-27

## 2019-08-28 ENCOUNTER — HOSPITAL ENCOUNTER (INPATIENT)
Facility: HOSPITAL | Age: 54
LOS: 8 days | Discharge: HOME HEALTH | DRG: 907 | End: 2019-09-05
Attending: SURGERY | Admitting: SURGERY
Payer: MEDICAID

## 2019-08-28 DIAGNOSIS — K76.89 LIVER CYST: Primary | ICD-10-CM

## 2019-08-28 DIAGNOSIS — K65.1 INTRA-ABDOMINAL ABSCESS: ICD-10-CM

## 2019-08-28 PROBLEM — R23.9 ALTERATION IN SKIN INTEGRITY DUE TO MOISTURE: Status: ACTIVE | Noted: 2019-08-28

## 2019-08-28 LAB
ALBUMIN SERPL BCP-MCNC: 1.3 G/DL (ref 3.5–5.2)
ALP SERPL-CCNC: 296 U/L (ref 55–135)
ALT SERPL W/O P-5'-P-CCNC: 65 U/L (ref 10–44)
ANION GAP SERPL CALC-SCNC: 9 MMOL/L (ref 8–16)
ANISOCYTOSIS BLD QL SMEAR: SLIGHT
AST SERPL-CCNC: 127 U/L (ref 10–40)
BASOPHILS # BLD AUTO: 0.01 K/UL (ref 0–0.2)
BASOPHILS NFR BLD: 0.2 % (ref 0–1.9)
BILIRUB SERPL-MCNC: 0.8 MG/DL (ref 0.1–1)
BUN SERPL-MCNC: 52 MG/DL (ref 6–20)
CALCIUM SERPL-MCNC: 8.3 MG/DL (ref 8.7–10.5)
CHLORIDE SERPL-SCNC: 103 MMOL/L (ref 95–110)
CO2 SERPL-SCNC: 24 MMOL/L (ref 23–29)
CREAT SERPL-MCNC: 0.7 MG/DL (ref 0.5–1.4)
DIFFERENTIAL METHOD: ABNORMAL
EOSINOPHIL # BLD AUTO: 0.1 K/UL (ref 0–0.5)
EOSINOPHIL NFR BLD: 0.9 % (ref 0–8)
ERYTHROCYTE [DISTWIDTH] IN BLOOD BY AUTOMATED COUNT: 18.2 % (ref 11.5–14.5)
EST. GFR  (AFRICAN AMERICAN): >60 ML/MIN/1.73 M^2
EST. GFR  (NON AFRICAN AMERICAN): >60 ML/MIN/1.73 M^2
GIANT PLATELETS BLD QL SMEAR: PRESENT
GLUCOSE SERPL-MCNC: 76 MG/DL (ref 70–110)
HCT VFR BLD AUTO: 27 % (ref 40–54)
HGB BLD-MCNC: 8.7 G/DL (ref 14–18)
HYPOCHROMIA BLD QL SMEAR: ABNORMAL
IMM GRANULOCYTES # BLD AUTO: 0.1 K/UL (ref 0–0.04)
IMM GRANULOCYTES NFR BLD AUTO: 1.5 % (ref 0–0.5)
INR PPP: 1.4 (ref 0.8–1.2)
LYMPHOCYTES # BLD AUTO: 0.9 K/UL (ref 1–4.8)
LYMPHOCYTES NFR BLD: 14.3 % (ref 18–48)
MAGNESIUM SERPL-MCNC: 2.5 MG/DL (ref 1.6–2.6)
MCH RBC QN AUTO: 24.8 PG (ref 27–31)
MCHC RBC AUTO-ENTMCNC: 32.2 G/DL (ref 32–36)
MCV RBC AUTO: 77 FL (ref 82–98)
MONOCYTES # BLD AUTO: 1 K/UL (ref 0.3–1)
MONOCYTES NFR BLD: 15.5 % (ref 4–15)
NEUTROPHILS # BLD AUTO: 4.4 K/UL (ref 1.8–7.7)
NEUTROPHILS NFR BLD: 67.6 % (ref 38–73)
NRBC BLD-RTO: 0 /100 WBC
OVALOCYTES BLD QL SMEAR: ABNORMAL
PHOSPHATE SERPL-MCNC: 3.4 MG/DL (ref 2.7–4.5)
PLATELET # BLD AUTO: 551 K/UL (ref 150–350)
PLATELET BLD QL SMEAR: ABNORMAL
PMV BLD AUTO: 10.1 FL (ref 9.2–12.9)
POIKILOCYTOSIS BLD QL SMEAR: SLIGHT
POLYCHROMASIA BLD QL SMEAR: ABNORMAL
POTASSIUM SERPL-SCNC: 3.5 MMOL/L (ref 3.5–5.1)
PROT SERPL-MCNC: 6.2 G/DL (ref 6–8.4)
PROTHROMBIN TIME: 13.6 SEC (ref 9–12.5)
RBC # BLD AUTO: 3.51 M/UL (ref 4.6–6.2)
SODIUM SERPL-SCNC: 136 MMOL/L (ref 136–145)
WBC # BLD AUTO: 6.56 K/UL (ref 3.9–12.7)

## 2019-08-28 PROCEDURE — 83735 ASSAY OF MAGNESIUM: CPT

## 2019-08-28 PROCEDURE — 36415 COLL VENOUS BLD VENIPUNCTURE: CPT

## 2019-08-28 PROCEDURE — 87186 SC STD MICRODIL/AGAR DIL: CPT | Mod: 59

## 2019-08-28 PROCEDURE — 20600001 HC STEP DOWN PRIVATE ROOM

## 2019-08-28 PROCEDURE — 99221 PR INITIAL HOSPITAL CARE,LEVL I: ICD-10-PCS | Mod: ,,, | Performed by: SURGERY

## 2019-08-28 PROCEDURE — 87077 CULTURE AEROBIC IDENTIFY: CPT

## 2019-08-28 PROCEDURE — 25500020 PHARM REV CODE 255: Performed by: SURGERY

## 2019-08-28 PROCEDURE — 63600175 PHARM REV CODE 636 W HCPCS: Performed by: STUDENT IN AN ORGANIZED HEALTH CARE EDUCATION/TRAINING PROGRAM

## 2019-08-28 PROCEDURE — 87076 CULTURE ANAEROBE IDENT EACH: CPT

## 2019-08-28 PROCEDURE — 99221 1ST HOSP IP/OBS SF/LOW 40: CPT | Mod: ,,, | Performed by: SURGERY

## 2019-08-28 PROCEDURE — 87075 CULTR BACTERIA EXCEPT BLOOD: CPT

## 2019-08-28 PROCEDURE — 85025 COMPLETE CBC W/AUTO DIFF WBC: CPT

## 2019-08-28 PROCEDURE — 85610 PROTHROMBIN TIME: CPT

## 2019-08-28 PROCEDURE — 84100 ASSAY OF PHOSPHORUS: CPT

## 2019-08-28 PROCEDURE — 80053 COMPREHEN METABOLIC PANEL: CPT

## 2019-08-28 PROCEDURE — 63600175 PHARM REV CODE 636 W HCPCS: Performed by: RADIOLOGY

## 2019-08-28 PROCEDURE — 87070 CULTURE OTHR SPECIMN AEROBIC: CPT

## 2019-08-28 PROCEDURE — 25000003 PHARM REV CODE 250: Performed by: STUDENT IN AN ORGANIZED HEALTH CARE EDUCATION/TRAINING PROGRAM

## 2019-08-28 RX ORDER — SODIUM CHLORIDE 9 MG/ML
INJECTION, SOLUTION INTRAVENOUS
Status: DISCONTINUED | OUTPATIENT
Start: 2019-08-28 | End: 2019-09-05 | Stop reason: HOSPADM

## 2019-08-28 RX ORDER — SODIUM CHLORIDE 0.9 % (FLUSH) 0.9 %
10 SYRINGE (ML) INJECTION
Status: DISCONTINUED | OUTPATIENT
Start: 2019-08-28 | End: 2019-09-05 | Stop reason: HOSPADM

## 2019-08-28 RX ORDER — LIDOCAINE HYDROCHLORIDE 10 MG/ML
1 INJECTION, SOLUTION EPIDURAL; INFILTRATION; INTRACAUDAL; PERINEURAL ONCE
Status: DISCONTINUED | OUTPATIENT
Start: 2019-08-28 | End: 2019-08-28

## 2019-08-28 RX ORDER — FENTANYL CITRATE 50 UG/ML
INJECTION, SOLUTION INTRAMUSCULAR; INTRAVENOUS CODE/TRAUMA/SEDATION MEDICATION
Status: DISCONTINUED | OUTPATIENT
Start: 2019-08-28 | End: 2019-09-05

## 2019-08-28 RX ORDER — DEXTROSE MONOHYDRATE, SODIUM CHLORIDE, AND POTASSIUM CHLORIDE 50; 1.49; 4.5 G/1000ML; G/1000ML; G/1000ML
INJECTION, SOLUTION INTRAVENOUS CONTINUOUS
Status: DISCONTINUED | OUTPATIENT
Start: 2019-08-28 | End: 2019-09-02

## 2019-08-28 RX ADMIN — IOHEXOL 15 ML: 300 INJECTION, SOLUTION INTRAVENOUS at 05:08

## 2019-08-28 RX ADMIN — FENTANYL CITRATE 50 MCG: 50 INJECTION, SOLUTION INTRAMUSCULAR; INTRAVENOUS at 05:08

## 2019-08-28 RX ADMIN — SODIUM CHLORIDE 50 ML/HR: 0.9 INJECTION, SOLUTION INTRAVENOUS at 05:08

## 2019-08-28 RX ADMIN — SODIUM CHLORIDE, SODIUM LACTATE, POTASSIUM CHLORIDE, AND CALCIUM CHLORIDE 1000 ML: .6; .31; .03; .02 INJECTION, SOLUTION INTRAVENOUS at 11:08

## 2019-08-28 RX ADMIN — DEXTROSE MONOHYDRATE, SODIUM CHLORIDE, AND POTASSIUM CHLORIDE: 50; 4.5; 1.49 INJECTION, SOLUTION INTRAVENOUS at 11:08

## 2019-08-28 NOTE — NURSING TRANSFER
Nursing Transfer Note      8/28/2019     Transfer To: IR    Transfer via stretcher    Transfer with IV pole    Transported by transporter    Medicines sent: iv fluids infusing    Chart send with patient: Yes    Notified: n/a

## 2019-08-28 NOTE — PLAN OF CARE
Patient reported he lives with family in a one story house in [place]. His niece will be able to provide transportation home. Currently no needs determined.     Pt does not have any insurance (Pending Medicaid) nor has PCP.     [facility] given to patient after informed about it;patient verbalized their understanding.        08/28/19 1420   Discharge Assessment   Assessment Type Discharge Planning Assessment   Confirmed/corrected address and phone number on facesheet? Yes   Assessment information obtained from? Patient;Medical Record   Expected Length of Stay (days)   (TBD/5+)   Communicated expected length of stay with patient/caregiver no  (Per MD)   Prior to hospitilization cognitive status: Alert/Oriented;No Deficits   Prior to hospitalization functional status: Independent   Current cognitive status: Alert/Oriented;No Deficits   Current Functional Status: Independent   Facility Arrived From:   ([facility])   Lives With sibling(s);child(dion), adult   Able to Return to Prior Arrangements yes   Is patient able to care for self after discharge? Yes   Who are your caregiver(s) and their phone number(s)?   (Daughter: Sarita LAZO [phone])   Patient's perception of discharge disposition home or selfcare   Readmission Within the Last 30 Days other (see comments)  (7/26-7/31/19 [facility])   Patient currently being followed by outpatient case management? No   Patient currently receives any other outside agency services? No   Equipment Currently Used at Home none   Do you have any problems affording any of your prescribed medications? TBD   Is the patient taking medications as prescribed?   (N/A not on any medications)   Does the patient have transportation home? Yes   Transportation Anticipated family or friend will provide   Dialysis Name and Scheduled days   (N/A)   Does the patient receive services at the [facility]mad[facility] Clinic? No   Discharge Plan A Home with family    Discharge Plan B Home with family   DME Needed Upon Discharge  none   Patient/Family in Agreement with Plan yes

## 2019-08-28 NOTE — ASSESSMENT & PLAN NOTE
54 y.o. male with a history of cystic hepatic mass s/p IR drain placement, presented to OSH due to leakage around drain and transferred to [facility] for IR assessment; currently with 8F in place     -NPO  -to IR today for drain upsize to 14F  -current drain to low continuous wall suction

## 2019-08-28 NOTE — CONSULTS
Wound care consulted for drain placement site and sacral area.   PMH: cystic hepatic mass, IR placement of perc cholecystostomy tube on 7/27/19  Assessment:  The IR drain is leaking around the tubing very slowly- tan/purulent/foul smelling liquid,   The sacral area and scrotum have MASD partial thickness skin loss. The sacral area also has a stage 2 pressure injury.    Discussed wound care with patient/verbalized understanding.   Treatment:  The IR drain site was cleansed and barrier film applied to skin to protect from moisture.  Dressing changed, tubing secured to abdomen.   The scrotal area has barrier paste noted, sacral wound has a foam dressing. Recommend using barrier paste BID to scrotum and sacral area.   Nursing to continue wound care, wound care to follow-up prn.  B. Zainab Abel RN, [facility]  [id number]     08/28/19 1000        Wound 08/28/19 0318 Other (comment) medial Sacral Spine   Date First Assessed/Time First Assessed: 08/28/19 0318   Pre-existing: Yes  Primary Wound Type: Other (comment)  Orientation: medial  Location: (c) Sacral Spine   Wound Image     Wound WDL ex   Dressing Appearance Open to air;No dressing   Drainage Amount None   Appearance Pink;Red;Moist   Tissue loss description Partial thickness   Red (%), Wound Tissue Color 100 %   Periwound Area Intact;Dry   Wound Edges Open   Wound Length (cm) 6 cm   Wound Width (cm) 7 cm   Wound Depth (cm) 0.2 cm   Wound Volume (cm^3) 8.4 cm^3   Wound Surface Area (cm^2) 42 cm^2   Care Cleansed with:;Sterile normal saline        Closed/Suction Drain 07/29/19 1037 Right Abdomen Bulb 8 Fr.   Placement Date/Time: 07/29/19 1037   Present Prior to Hospital Arrival?: No  Inserted by: MD  Tube Number: 1  Orientation: Right  Location: Abdomen  Drain Tube Type: Bulb  Size (Fr.): 8 Fr.  Drain Reservoir Size (mL): 100 mL   Site Description Leaking at site   Dressing Type Gauze   Dressing Status New drainage;Intact   Dressing Intervention Dressing changed   Drainage  Brown;Purulent;Green   Status To bulb suction  (to low wall suction)

## 2019-08-28 NOTE — HPI
Kishan Sánchez is a 53 y.o. 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 This onset two days ago and has been getting progressively worse. It is associated with a foul odor. Workup in the ED demonstrates slight decrease in size of the fluid collection via CT, though a large collection is still present. He was transferred to [facility] [place] for IR assessment and management. On arrival to [facility] [place], patient denies CP, F/C, N/V, diarrhea, abdominal pain, urinary changes, hematochezia, hematemesis. Patient is AF, VSS.

## 2019-08-28 NOTE — SUBJECTIVE & OBJECTIVE
Interval History:   Patient seen and examined, no acute events overnight  Denies abdominal pain  NPO, denies N/V  IR drain put out 440mL  Afebrile/VSS      Medications:  Continuous Infusions:  Scheduled Meds:   lidocaine (PF) 10 mg/ml (1%)  1 mL Other Once     PRN Meds:sodium chloride 0.9%     Review of patient's allergies indicates:  No Known Allergies  Objective:     Vital Signs (Most Recent):  Temp: 97.4 °F (36.3 °C) (08/28/19 0534)  Pulse: 70 (08/28/19 0534)  Resp: 16 (08/28/19 0534)  BP: 112/70 (08/28/19 0534)  SpO2: 100 % (08/28/19 0534) Vital Signs (24h Range):  Temp:  [97.3 °F (36.3 °C)-98.4 °F (36.9 °C)] 97.4 °F (36.3 °C)  Pulse:  [] 70  Resp:  [10-24] 16  SpO2:  [99 %-100 %] 100 %  BP: ()/(67-92) 112/70        There is no height or weight on file to calculate BMI.    Intake/Output - Last 3 Shifts       08/26 0700 - 08/27 0659 08/27 0700 - 08/28 0659 08/28 0700 - 08/29 0659    Urine  500     Other  440     Stool  0     Total Output  940     Net  -940            Stool Occurrence  0 x           Physical Exam   Constitutional: He appears well-developed and well-nourished. No distress.   HENT:   Head: Normocephalic and atraumatic.   Cardiovascular: Normal rate and regular rhythm.   Pulmonary/Chest: Effort normal. No respiratory distress.   Abdominal:   Soft, NTND  IR drain in place with purulent output       Significant Labs:  CBC:   Recent Labs   Lab 08/27/19  1527   WBC 7.22   RBC 3.86*   HGB 9.7*   HCT 30.2*   *   MCV 78*   MCH 25.1*   MCHC 32.1     BMP:   Recent Labs   Lab 08/28/19  0600   GLU 76      K 3.5      CO2 24   BUN 52*   CREATININE 0.7   CALCIUM 8.3*   MG 2.5     CMP:   Recent Labs   Lab 08/28/19  0600   GLU 76   CALCIUM 8.3*   ALBUMIN 1.3*   PROT 6.2      K 3.5   CO2 24      BUN 52*   CREATININE 0.7   ALKPHOS 296*   ALT 65*   *   BILITOT 0.8     LFTs:   Recent Labs   Lab 08/28/19  0600   ALT 65*   *   ALKPHOS 296*   BILITOT 0.8   PROT 6.2    ALBUMIN 1.3*     Coagulation:   Recent Labs   Lab 08/28/19  0736   LABPROT 13.6*   INR 1.4*

## 2019-08-28 NOTE — CONSULTS
Radiology Consult    Kishan Sánchez is a 54 y.o. male with a history of cystic hepatic mass s/p IR drain placement.  Pt presented to [facility] due to leakage around drain and transferred to [facility] for IR assessment.  IR consulted for drain upsize.  8 Portuguese drain currently in place.    History reviewed. No pertinent past medical history.  History reviewed. No pertinent surgical history.    Imaging reviewed with Radiology staff, Dr. Guy.     Procedure: Abdominal drain upsize    Scheduled Meds:    lidocaine (PF) 10 mg/ml (1%)  1 mL Other Once     Continuous Infusions:   PRN Meds:sodium chloride 0.9%    Allergies: Review of patient's allergies indicates:  No Known Allergies    Labs:  No results for input(s): INR in the last 168 hours.    Invalid input(s):  PT,  PTT    Recent Labs   Lab 08/27/19  1527   WBC 7.22   HGB 9.7*   HCT 30.2*   MCV 78*   *      Recent Labs   Lab 08/28/19  0600   GLU 76      K 3.5      CO2 24   BUN 52*   CREATININE 0.7   CALCIUM 8.3*   MG 2.5   ALT 65*   *   ALBUMIN 1.3*   BILITOT 0.8         Vitals (Most Recent):  Temp: 97.4 °F (36.3 °C) (08/28/19 0534)  Pulse: 70 (08/28/19 0534)  Resp: 16 (08/28/19 0534)  BP: 112/70 (08/28/19 0534)  SpO2: 100 % (08/28/19 0534)    Plan:   1. NPO.  2. Hold anticoagulants.  3. Drain upsize to 14 F planned for today (pending availability).      Jess Martinez MD  Resident  Department of Radiology  Pager: [phone]

## 2019-08-28 NOTE — SUBJECTIVE & OBJECTIVE
Current Facility-Administered Medications on File Prior to Encounter   Medication    [COMPLETED] iohexol (OMNIPAQUE 350) injection 80 mL    [COMPLETED] piperacillin-tazobactam 4.5 g in dextrose 5 % 100 mL IVPB (ready to mix system)    [COMPLETED] sodium chloride 0.9% bolus 1,000 mL     No current outpatient medications on file prior to encounter.       Review of patient's allergies indicates:  No Known Allergies    History reviewed. No pertinent past medical history.  History reviewed. No pertinent surgical history.  Family History     None        Tobacco Use    Smoking status: Never Smoker    Smokeless tobacco: Never Used   Substance and Sexual Activity    Alcohol use: Not Currently     Comment: quick drinking 6 years ago    Drug use: Never    Sexual activity: Not Currently     Partners: Female     Review of Systems   Constitutional: Negative for chills and fever.   Cardiovascular: Negative for chest pain.   Gastrointestinal: Positive for abdominal distention. Negative for abdominal pain.        Leakage around abdominal SHELBI drain   All other systems reviewed and are negative.    Objective:     Vital Signs (Most Recent):  Temp: 97.3 °F (36.3 °C) (08/28/19 0240)  Pulse: 85 (08/28/19 0240)  Resp: 18 (08/28/19 0240)  BP: 108/69 (08/28/19 0240)  SpO2: 100 % (08/28/19 0240) Vital Signs (24h Range):  Temp:  [97.3 °F (36.3 °C)-98.4 °F (36.9 °C)] 97.3 °F (36.3 °C)  Pulse:  [] 85  Resp:  [10-24] 18  SpO2:  [99 %-100 %] 100 %  BP: ()/(67-92) 108/69        There is no height or weight on file to calculate BMI.    Physical Exam   Constitutional: He is oriented to person, place, and time. He appears well-developed and well-nourished.   HENT:   Head: Normocephalic and atraumatic.   Cardiovascular: Normal rate and regular rhythm.   Pulmonary/Chest: Effort normal. No respiratory distress.   Abdominal: He exhibits distension. There is no tenderness.   SHELBI drain in RUQ, biliary drainage from the tube site    Neurological: He is alert and oriented to person, place, and time.   Skin: Skin is warm and dry.   Sacral skin breakdown   Psychiatric: He has a normal mood and affect. His behavior is normal.   Nursing note and vitals reviewed.      Significant Labs:  Recent Labs   Lab 08/27/19  1527   WBC 7.22   RBC 3.86*   HGB 9.7*   HCT 30.2*   *   MCV 78*   MCH 25.1*   MCHC 32.1     CMP  Sodium   Date Value Ref Range Status   08/27/2019 133 (L) 136 - 145 mmol/L Final     Potassium   Date Value Ref Range Status   08/27/2019 3.6 3.5 - 5.1 mmol/L Final     Chloride   Date Value Ref Range Status   08/27/2019 97 95 - 110 mmol/L Final     CO2   Date Value Ref Range Status   08/27/2019 24 23 - 29 mmol/L Final     Glucose   Date Value Ref Range Status   08/27/2019 97 70 - 110 mg/dL Final     BUN, Bld   Date Value Ref Range Status   08/27/2019 74 (H) 6 - 20 mg/dL Final     Creatinine   Date Value Ref Range Status   08/27/2019 1.1 0.5 - 1.4 mg/dL Final     Calcium   Date Value Ref Range Status   08/27/2019 8.6 (L) 8.7 - 10.5 mg/dL Final     Total Protein   Date Value Ref Range Status   08/27/2019 7.4 6.0 - 8.4 g/dL Final     Albumin   Date Value Ref Range Status   08/27/2019 1.6 (L) 3.5 - 5.2 g/dL Final     Total Bilirubin   Date Value Ref Range Status   08/27/2019 0.8 0.1 - 1.0 mg/dL Final     Comment:     For infants and newborns, interpretation of results should be based  on gestational age, weight and in agreement with clinical  observations.  Premature Infant recommended reference ranges:  Up to 24 hours.............<8.0 mg/dL  Up to 48 hours............<12.0 mg/dL  3-5 days..................<15.0 mg/dL  6-29 days.................<15.0 mg/dL       Alkaline Phosphatase   Date Value Ref Range Status   08/27/2019 316 (H) 55 - 135 U/L Final     AST   Date Value Ref Range Status   08/27/2019 176 (H) 10 - 40 U/L Final     ALT   Date Value Ref Range Status   08/27/2019 85 (H) 10 - 44 U/L Final     Anion Gap   Date Value Ref Range  Status   08/27/2019 12 8 - 16 mmol/L Final     eGFR if    Date Value Ref Range Status   08/27/2019 >60.0 >60 mL/min/1.73 m^2 Final     eGFR if non    Date Value Ref Range Status   08/27/2019 >60.0 >60 mL/min/1.73 m^2 Final     Comment:     Calculation used to obtain the estimated glomerular filtration  rate (eGFR) is the CKD-EPI equation.        Significant Diagnostics:  I have reviewed all pertinent imaging results/findings within the past 24 hours.

## 2019-08-28 NOTE — PLAN OF CARE
Procedure complete. VSS. Fluid sent for micro. Pt. Moved to stretcher. Drowsy, responding appropriately. No pain or discomfort verbalized. Transferred to room and report called to floor.

## 2019-08-28 NOTE — PROGRESS NOTES
Pt arrived to unit via stretcher. RLQ drain in place, copious amounts of drainage noted from bulb and entry site of drain. Foul odor noted. Pt with breakdown to sacrum, wound care consult placed for drain site and sacrum. Applied foam dressing to sacral wound. Notified MD Spenser of pt's arrival to floor. VSS, pt in no acute distress. See flowsheet for full assessment. Will continue to monitor patient

## 2019-08-28 NOTE — PROCEDURES
Radiology Post-Procedure Note    Pre Op Diagnosis: Perihepatic abscess  Post Op Diagnosis: Same    Procedure: Abscessogram and drain exchange    Procedure performed by: Joe Castañeda MD    Written Informed Consent Obtained: Yes  Specimen Removed: YES 400mL purulent fluid  Estimated Blood Loss: Minimal    Findings:   Abscessogram performed followed by drain exchange and upsize.  New 14F tube in place.  Tube connected to suction and 400mL purulent fluid removed.  No complications.    Patient tolerated procedure well.    Joe Castañeda MD  Diagnostic and Interventional Radiologist  Department of Radiology  Pager: [phone]

## 2019-08-28 NOTE — PROGRESS NOTES
Drain placed to low continuous wall suction, so far 450 ml out. RN spoke with THAO Streeter about starting pt on IV fluids. PA stated she would speak with team.     Wound care consult placed, possible stage II on sacrum. Foam dressing replaced, pt turned on pillow. Waffle overlay ordered.

## 2019-08-28 NOTE — PLAN OF CARE
Pt to IR. Order,consent and labs reviewed. Pt. Moved self to procedure table. Monitored. Safe and comfortable on table.

## 2019-08-28 NOTE — H&P
Inpatient Radiology Pre-procedure Note    History of Present Illness:  Kishan Sánchez is a 54 y.o. male who presents for abdominal drain upsize.  Of note, pt reports difficulty laying flat due to abdominal pain. Pt laying comfortably at 15 degrees upon evaluation.  He is willing to attempt laying flat if needed.    Admission H&P reviewed.  History reviewed. No pertinent past medical history.  History reviewed. No pertinent surgical history.    Review of Systems:   As documented in primary team H&P    Home Meds:   Prior to Admission medications    Not on File     Scheduled Meds:   Continuous Infusions:   PRN Meds:sodium chloride 0.9%  Anticoagulants/Antiplatelets: no anticoagulation    Allergies: Review of patient's allergies indicates:  No Known Allergies  Sedation Hx: have not been any systemic reactions    Labs:  Recent Labs   Lab 08/28/19 0736   INR 1.4*       Recent Labs   Lab 08/28/19 0600   WBC 6.56   HGB 8.7*   HCT 27.0*   MCV 77*   *      Recent Labs   Lab 08/28/19 0600   GLU 76      K 3.5      CO2 24   BUN 52*   CREATININE 0.7   CALCIUM 8.3*   MG 2.5   ALT 65*   *   ALBUMIN 1.3*   BILITOT 0.8         Vitals:  Temp: 97.4 °F (36.3 °C) (08/28/19 0534)  Pulse: 70 (08/28/19 0534)  Resp: 16 (08/28/19 0534)  BP: 112/70 (08/28/19 0534)  SpO2: 100 % (08/28/19 0534)     Physical Exam:  ASA: 2  Mallampati: 2    General: no acute distress  Mental Status: alert and oriented to person, place and time  HEENT: normocephalic, atraumatic  Chest: unlabored breathing  Abdomen: +distended  Extremity: moves all extremities    Plan: Abscess tube upsize  Sedation Plan: Moderate    Jess Martinez MD  Resident  Department of Radiology  Pager: [phone]

## 2019-08-28 NOTE — H&P
[facility]  General Surgery  History & Physical    Patient Name: Kishan Sánchez  MRN: [id number]  Admission Date: 8/28/2019  Attending Physician: Mark Altamirano MD   Primary Care Provider: Primary Doctor No    Patient information was obtained from patient, past medical records and ER records.     Subjective:     Chief Complaint/Reason for Admission: leakage around RUQ abdominal drain    History of Present Illness: Kishan Sánchez is a 53 y.o. 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 This onset two days ago and has been getting progressively worse. It is associated with a foul odor. Workup in the ED demonstrates slight decrease in size of the fluid collection via CT, though a large collection is still present. He was transferred to [facility] for IR assessment and management. On arrival to [facility], patient denies CP, F/C, N/V, diarrhea, abdominal pain, urinary changes, hematochezia, hematemesis. Patient is AF, VSS.      Current Facility-Administered Medications on File Prior to Encounter   Medication    [COMPLETED] iohexol (OMNIPAQUE 350) injection 80 mL    [COMPLETED] piperacillin-tazobactam 4.5 g in dextrose 5 % 100 mL IVPB (ready to mix system)    [COMPLETED] sodium chloride 0.9% bolus 1,000 mL     No current outpatient medications on file prior to encounter.       Review of patient's allergies indicates:  No Known Allergies    History reviewed. No pertinent past medical history.  History reviewed. No pertinent surgical history.  Family History     None        Tobacco Use    Smoking status: Never Smoker    Smokeless tobacco: Never Used   Substance and Sexual Activity    Alcohol use: Not Currently     Comment: quick drinking 6 years ago    Drug use: Never    Sexual activity: Not Currently     Partners: Female     Review of Systems   Constitutional:  Negative for chills and fever.   Cardiovascular: Negative for chest pain.   Gastrointestinal: Positive for abdominal distention. Negative for abdominal pain.        Leakage around abdominal SHELBI drain   All other systems reviewed and are negative.    Objective:     Vital Signs (Most Recent):  Temp: 97.3 °F (36.3 °C) (08/28/19 0240)  Pulse: 85 (08/28/19 0240)  Resp: 18 (08/28/19 0240)  BP: 108/69 (08/28/19 0240)  SpO2: 100 % (08/28/19 0240) Vital Signs (24h Range):  Temp:  [97.3 °F (36.3 °C)-98.4 °F (36.9 °C)] 97.3 °F (36.3 °C)  Pulse:  [] 85  Resp:  [10-24] 18  SpO2:  [99 %-100 %] 100 %  BP: ()/(67-92) 108/69        There is no height or weight on file to calculate BMI.    Physical Exam   Constitutional: He is oriented to person, place, and time. He appears well-developed and well-nourished.   HENT:   Head: Normocephalic and atraumatic.   Cardiovascular: Normal rate and regular rhythm.   Pulmonary/Chest: Effort normal. No respiratory distress.   Abdominal: He exhibits distension. There is no tenderness.   SHELBI drain in RUQ, biliary drainage from the tube site   Neurological: He is alert and oriented to person, place, and time.   Skin: Skin is warm and dry.   Sacral skin breakdown   Psychiatric: He has a normal mood and affect. His behavior is normal.   Nursing note and vitals reviewed.      Significant Labs:  Recent Labs   Lab 08/27/19  1527   WBC 7.22   RBC 3.86*   HGB 9.7*   HCT 30.2*   *   MCV 78*   MCH 25.1*   MCHC 32.1     CMP  Sodium   Date Value Ref Range Status   08/27/2019 133 (L) 136 - 145 mmol/L Final     Potassium   Date Value Ref Range Status   08/27/2019 3.6 3.5 - 5.1 mmol/L Final     Chloride   Date Value Ref Range Status   08/27/2019 97 95 - 110 mmol/L Final     CO2   Date Value Ref Range Status   08/27/2019 24 23 - 29 mmol/L Final     Glucose   Date Value Ref Range Status   08/27/2019 97 70 - 110 mg/dL Final     BUN, Bld   Date Value Ref Range Status   08/27/2019 74 (H) 6 - 20 mg/dL  Final     Creatinine   Date Value Ref Range Status   08/27/2019 1.1 0.5 - 1.4 mg/dL Final     Calcium   Date Value Ref Range Status   08/27/2019 8.6 (L) 8.7 - 10.5 mg/dL Final     Total Protein   Date Value Ref Range Status   08/27/2019 7.4 6.0 - 8.4 g/dL Final     Albumin   Date Value Ref Range Status   08/27/2019 1.6 (L) 3.5 - 5.2 g/dL Final     Total Bilirubin   Date Value Ref Range Status   08/27/2019 0.8 0.1 - 1.0 mg/dL Final     Comment:     For infants and newborns, interpretation of results should be based  on gestational age, weight and in agreement with clinical  observations.  Premature Infant recommended reference ranges:  Up to 24 hours.............<8.0 mg/dL  Up to 48 hours............<12.0 mg/dL  3-5 days..................<15.0 mg/dL  6-29 days.................<15.0 mg/dL       Alkaline Phosphatase   Date Value Ref Range Status   08/27/2019 316 (H) 55 - 135 U/L Final     AST   Date Value Ref Range Status   08/27/2019 176 (H) 10 - 40 U/L Final     ALT   Date Value Ref Range Status   08/27/2019 85 (H) 10 - 44 U/L Final     Anion Gap   Date Value Ref Range Status   08/27/2019 12 8 - 16 mmol/L Final     eGFR if    Date Value Ref Range Status   08/27/2019 >60.0 >60 mL/min/1.73 m^2 Final     eGFR if non    Date Value Ref Range Status   08/27/2019 >60.0 >60 mL/min/1.73 m^2 Final     Comment:     Calculation used to obtain the estimated glomerular filtration  rate (eGFR) is the CKD-EPI equation.        Significant Diagnostics:  I have reviewed all pertinent imaging results/findings within the past 24 hours.    Assessment/Plan:     * Liver cyst  -Admit to IP  -CBC, CMP, Mg, Phos  -NPO  -Monitor vital signs  -Consult IR for drain assessment      VTE Risk Mitigation (From admission, onward)        Ordered     Place BRENDON hose  Until discontinued      08/28/19 0340     IP VTE LOW RISK PATIENT  Once      08/28/19 0340          Daria Dueñas MD  General Surgery  [facility]

## 2019-08-28 NOTE — PROGRESS NOTES
[facility]  General Surgery  Progress Note    Subjective:     History of Present Illness:  Kishan Sánchez is a 53 y.o. 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 This onset two days ago and has been getting progressively worse. It is associated with a foul odor. Workup in the ED demonstrates slight decrease in size of the fluid collection via CT, though a large collection is still present. He was transferred to [facility] for IR assessment and management. On arrival to [facility], patient denies CP, F/C, N/V, diarrhea, abdominal pain, urinary changes, hematochezia, hematemesis. Patient is AF, VSS.      Post-Op Info:  * No surgery found *         Interval History:   Patient seen and examined, no acute events overnight  Denies abdominal pain  NPO, denies N/V  IR drain put out 440mL  Afebrile/VSS      Medications:  Continuous Infusions:  Scheduled Meds:   lidocaine (PF) 10 mg/ml (1%)  1 mL Other Once     PRN Meds:sodium chloride 0.9%     Review of patient's allergies indicates:  No Known Allergies  Objective:     Vital Signs (Most Recent):  Temp: 97.4 °F (36.3 °C) (08/28/19 0534)  Pulse: 70 (08/28/19 0534)  Resp: 16 (08/28/19 0534)  BP: 112/70 (08/28/19 0534)  SpO2: 100 % (08/28/19 0534) Vital Signs (24h Range):  Temp:  [97.3 °F (36.3 °C)-98.4 °F (36.9 °C)] 97.4 °F (36.3 °C)  Pulse:  [] 70  Resp:  [10-24] 16  SpO2:  [99 %-100 %] 100 %  BP: ()/(67-92) 112/70        There is no height or weight on file to calculate BMI.    Intake/Output - Last 3 Shifts       08/26 0700 - 08/27 0659 08/27 0700 - 08/28 0659 08/28 0700 - 08/29 0659    Urine  500     Other  440     Stool  0     Total Output  940     Net  -940            Stool Occurrence  0 x           Physical Exam   Constitutional: He appears well-developed and well-nourished. No distress.   HENT:   Head:  Normocephalic and atraumatic.   Cardiovascular: Normal rate and regular rhythm.   Pulmonary/Chest: Effort normal. No respiratory distress.   Abdominal:   Soft, NTND  IR drain in place with purulent output       Significant Labs:  CBC:   Recent Labs   Lab 08/27/19  1527   WBC 7.22   RBC 3.86*   HGB 9.7*   HCT 30.2*   *   MCV 78*   MCH 25.1*   MCHC 32.1     BMP:   Recent Labs   Lab 08/28/19  0600   GLU 76      K 3.5      CO2 24   BUN 52*   CREATININE 0.7   CALCIUM 8.3*   MG 2.5     CMP:   Recent Labs   Lab 08/28/19  0600   GLU 76   CALCIUM 8.3*   ALBUMIN 1.3*   PROT 6.2      K 3.5   CO2 24      BUN 52*   CREATININE 0.7   ALKPHOS 296*   ALT 65*   *   BILITOT 0.8     LFTs:   Recent Labs   Lab 08/28/19  0600   ALT 65*   *   ALKPHOS 296*   BILITOT 0.8   PROT 6.2   ALBUMIN 1.3*     Coagulation:   Recent Labs   Lab 08/28/19  0736   LABPROT 13.6*   INR 1.4*     Assessment/Plan:     * Liver cyst  54 y.o. male with a history of cystic hepatic mass s/p IR drain placement, presented to OSH due to leakage around drain and transferred to [facility] for IR assessment; currently with 8F in place     -NPO  -to IR today for drain upsize to 14F  -current drain to low continuous wall suction            Syl Lares PA-C   [id number]  General Surgery  [facility]

## 2019-08-29 LAB
ALBUMIN SERPL BCP-MCNC: 1.2 G/DL (ref 3.5–5.2)
ALP SERPL-CCNC: 294 U/L (ref 55–135)
ALT SERPL W/O P-5'-P-CCNC: 55 U/L (ref 10–44)
ANION GAP SERPL CALC-SCNC: 7 MMOL/L (ref 8–16)
ANISOCYTOSIS BLD QL SMEAR: SLIGHT
AST SERPL-CCNC: 91 U/L (ref 10–40)
BASOPHILS # BLD AUTO: 0.01 K/UL (ref 0–0.2)
BASOPHILS NFR BLD: 0.1 % (ref 0–1.9)
BILIRUB SERPL-MCNC: 0.6 MG/DL (ref 0.1–1)
BUN SERPL-MCNC: 26 MG/DL (ref 6–20)
CALCIUM SERPL-MCNC: 8 MG/DL (ref 8.7–10.5)
CHLORIDE SERPL-SCNC: 106 MMOL/L (ref 95–110)
CO2 SERPL-SCNC: 25 MMOL/L (ref 23–29)
CREAT SERPL-MCNC: 0.6 MG/DL (ref 0.5–1.4)
DACRYOCYTES BLD QL SMEAR: ABNORMAL
DIFFERENTIAL METHOD: ABNORMAL
EOSINOPHIL # BLD AUTO: 0.1 K/UL (ref 0–0.5)
EOSINOPHIL NFR BLD: 1.3 % (ref 0–8)
ERYTHROCYTE [DISTWIDTH] IN BLOOD BY AUTOMATED COUNT: 18.3 % (ref 11.5–14.5)
EST. GFR  (AFRICAN AMERICAN): >60 ML/MIN/1.73 M^2
EST. GFR  (NON AFRICAN AMERICAN): >60 ML/MIN/1.73 M^2
GIANT PLATELETS BLD QL SMEAR: PRESENT
GLUCOSE SERPL-MCNC: 115 MG/DL (ref 70–110)
HCT VFR BLD AUTO: 27.1 % (ref 40–54)
HGB BLD-MCNC: 8.5 G/DL (ref 14–18)
HYPOCHROMIA BLD QL SMEAR: ABNORMAL
IMM GRANULOCYTES # BLD AUTO: 0.11 K/UL (ref 0–0.04)
IMM GRANULOCYTES NFR BLD AUTO: 1.5 % (ref 0–0.5)
LYMPHOCYTES # BLD AUTO: 1.2 K/UL (ref 1–4.8)
LYMPHOCYTES NFR BLD: 16.3 % (ref 18–48)
MAGNESIUM SERPL-MCNC: 2.2 MG/DL (ref 1.6–2.6)
MCH RBC QN AUTO: 24.3 PG (ref 27–31)
MCHC RBC AUTO-ENTMCNC: 31.4 G/DL (ref 32–36)
MCV RBC AUTO: 77 FL (ref 82–98)
MONOCYTES # BLD AUTO: 1.2 K/UL (ref 0.3–1)
MONOCYTES NFR BLD: 16.3 % (ref 4–15)
NEUTROPHILS # BLD AUTO: 4.7 K/UL (ref 1.8–7.7)
NEUTROPHILS NFR BLD: 64.5 % (ref 38–73)
NRBC BLD-RTO: 0 /100 WBC
OVALOCYTES BLD QL SMEAR: ABNORMAL
PHOSPHATE SERPL-MCNC: 2.2 MG/DL (ref 2.7–4.5)
PLATELET # BLD AUTO: 547 K/UL (ref 150–350)
PLATELET BLD QL SMEAR: ABNORMAL
PMV BLD AUTO: 10.1 FL (ref 9.2–12.9)
POIKILOCYTOSIS BLD QL SMEAR: SLIGHT
POLYCHROMASIA BLD QL SMEAR: ABNORMAL
POTASSIUM SERPL-SCNC: 4 MMOL/L (ref 3.5–5.1)
PROT SERPL-MCNC: 5.8 G/DL (ref 6–8.4)
RBC # BLD AUTO: 3.5 M/UL (ref 4.6–6.2)
SODIUM SERPL-SCNC: 138 MMOL/L (ref 136–145)
TARGETS BLD QL SMEAR: ABNORMAL
TOXIC GRANULES BLD QL SMEAR: PRESENT
WBC # BLD AUTO: 7.2 K/UL (ref 3.9–12.7)

## 2019-08-29 PROCEDURE — 63600175 PHARM REV CODE 636 W HCPCS: Performed by: STUDENT IN AN ORGANIZED HEALTH CARE EDUCATION/TRAINING PROGRAM

## 2019-08-29 PROCEDURE — 25000003 PHARM REV CODE 250: Performed by: STUDENT IN AN ORGANIZED HEALTH CARE EDUCATION/TRAINING PROGRAM

## 2019-08-29 PROCEDURE — 97165 OT EVAL LOW COMPLEX 30 MIN: CPT

## 2019-08-29 PROCEDURE — 20600001 HC STEP DOWN PRIVATE ROOM

## 2019-08-29 PROCEDURE — 25000003 PHARM REV CODE 250: Performed by: SURGERY

## 2019-08-29 PROCEDURE — 36415 COLL VENOUS BLD VENIPUNCTURE: CPT

## 2019-08-29 PROCEDURE — 85025 COMPLETE CBC W/AUTO DIFF WBC: CPT

## 2019-08-29 PROCEDURE — 80053 COMPREHEN METABOLIC PANEL: CPT

## 2019-08-29 PROCEDURE — 84100 ASSAY OF PHOSPHORUS: CPT

## 2019-08-29 PROCEDURE — 83735 ASSAY OF MAGNESIUM: CPT

## 2019-08-29 RX ORDER — SODIUM,POTASSIUM PHOSPHATES 280-250MG
2 POWDER IN PACKET (EA) ORAL ONCE
Status: COMPLETED | OUTPATIENT
Start: 2019-08-29 | End: 2019-08-29

## 2019-08-29 RX ORDER — IBUPROFEN 600 MG/1
600 TABLET ORAL ONCE
Status: COMPLETED | OUTPATIENT
Start: 2019-08-30 | End: 2019-08-30

## 2019-08-29 RX ORDER — SODIUM,POTASSIUM PHOSPHATES 280-250MG
2 POWDER IN PACKET (EA) ORAL ONCE
Status: DISCONTINUED | OUTPATIENT
Start: 2019-08-29 | End: 2019-08-29

## 2019-08-29 RX ADMIN — DEXTROSE MONOHYDRATE, SODIUM CHLORIDE, AND POTASSIUM CHLORIDE: 50; 4.5; 1.49 INJECTION, SOLUTION INTRAVENOUS at 02:08

## 2019-08-29 RX ADMIN — SODIUM CHLORIDE, SODIUM LACTATE, POTASSIUM CHLORIDE, AND CALCIUM CHLORIDE 1000 ML: .6; .31; .03; .02 INJECTION, SOLUTION INTRAVENOUS at 01:08

## 2019-08-29 RX ADMIN — DEXTROSE MONOHYDRATE, SODIUM CHLORIDE, AND POTASSIUM CHLORIDE: 50; 4.5; 1.49 INJECTION, SOLUTION INTRAVENOUS at 11:08

## 2019-08-29 RX ADMIN — POTASSIUM & SODIUM PHOSPHATES POWDER PACK 280-160-250 MG 2 PACKET: 280-160-250 PACK at 09:08

## 2019-08-29 NOTE — ASSESSMENT & PLAN NOTE
54 y.o. male with a history of cystic hepatic mass s/p IR drain placement, presented to OSH due to leakage around drain and transferred to [facility] for IR assessment; currently with 8F in place     -reg diet  -OR planning for tomorrow versus weekend  -current drain to low continuous wall suction

## 2019-08-29 NOTE — SUBJECTIVE & OBJECTIVE
Interval History: No acute events overnight. IR yesterday with copious output from drain.     Medications:  Continuous Infusions:   sodium chloride 0.9% 50 mL/hr (08/28/19 1712)    dextrose 5 % and 0.45 % NaCl with KCl 20 mEq 100 mL/hr at 08/28/19 1113     Scheduled Meds:   potassium, sodium phosphates  2 packet Oral Once     PRN Meds:sodium chloride 0.9%, fentaNYL, sodium chloride 0.9%     Review of patient's allergies indicates:  No Known Allergies  Objective:     Vital Signs (Most Recent):  Temp: 98 °F (36.7 °C) (08/29/19 0630)  Pulse: 81 (08/29/19 0118)  Resp: 16 (08/29/19 0630)  BP: 115/62 (08/29/19 0630)  SpO2: 100 % (08/29/19 0630) Vital Signs (24h Range):  Temp:  [97.3 °F (36.3 °C)-98.2 °F (36.8 °C)] 98 °F (36.7 °C)  Pulse:  [66-81] 81  Resp:  [15-20] 16  SpO2:  [98 %-100 %] 100 %  BP: ()/(58-72) 115/62     Weight: 55.2 kg (121 lb 9.3 oz)  Body mass index is 20.87 kg/m².    Intake/Output - Last 3 Shifts       08/27 0700 - 08/28 0659 08/28 0700 - 08/29 0659 08/29 0700 - 08/30 0659    P.O.  320     I.V. (mL/kg)  325 (5.9)     IV Piggyback  1000     Total Intake(mL/kg)  1645 (29.9)     Urine (mL/kg/hr) 500 1050 (0.8)     Drains  2620     Other 440 400     Stool 0 0     Total Output 940 4070     Net -940 -2425            Stool Occurrence 0 x 0 x           Physical Exam   Constitutional: He appears well-developed and well-nourished. No distress.   HENT:   Head: Normocephalic and atraumatic.   Cardiovascular: Normal rate and regular rhythm.   Pulmonary/Chest: Effort normal. No respiratory distress.   Abdominal:   Soft, NTND  IR drain in place with purulent output       Significant Labs:  CBC:   Recent Labs   Lab 08/29/19  0345   WBC 7.20   RBC 3.50*   HGB 8.5*   HCT 27.1*   *   MCV 77*   MCH 24.3*   MCHC 31.4*     CMP:   Recent Labs   Lab 08/29/19  0345   *   CALCIUM 8.0*   ALBUMIN 1.2*   PROT 5.8*      K 4.0   CO2 25      BUN 26*   CREATININE 0.6   ALKPHOS 294*   ALT 55*   AST  91*   BILITOT 0.6       Significant Diagnostics:  I have reviewed all pertinent imaging results/findings within the past 24 hours.

## 2019-08-29 NOTE — PLAN OF CARE
Problem: Adult Inpatient Plan of Care  Goal: Plan of Care Review  Outcome: Ongoing (interventions implemented as appropriate)  POC reviewed with pt, pt verbalized understanding. VSS on room air. RUQ IR drain replaced this evening, 2620 ml out during shift with bulb on low cont wall suction. NPO, no diet orders post procedure. Wound care to sacral pressure ulcer complete. Barrier cream applied to groin for MAD. Pt very weak and unable to tolerate sitting up in chair for long periods of time due to sacral pain. Waffle overlay applied to bed. Turning Q2H in bed. PT/OT ordered. None tele.

## 2019-08-29 NOTE — PLAN OF CARE
Problem: Occupational Therapy Goal  Goal: Occupational Therapy Goal  Goals to be met by: 7 days (9/5/19)     Patient will increase functional independence with ADLs by performing:    UE Dressing with Stand-by Assistance.  LE Dressing with Minimal Assistance.  Grooming while standing at sink with Contact Guard Assistance.  Toileting from toilet with Contact Guard Assistance for hygiene and clothing management.   Supine to sit with Stand-by Assistance.  Toilet transfer to toilet with Contact Guard Assistance.  Complete functional mobility household distance with CGA using AD as needed.    Outcome: Ongoing (interventions implemented as appropriate)  Eval and POC set 8/29/19

## 2019-08-29 NOTE — PLAN OF CARE
Problem: Adult Inpatient Plan of Care  Goal: Plan of Care Review  Outcome: Revised  POC reviewed with patient who verbalized understanding. VSS on room air, SBP 90's, Dr Archibald aware. AAOX4. Remains free of falls and injury. Pt up X 1 assist with PT today.    Sacrum dressing intact, wound care orders. Slight bleeding and skin tear noted on scrotum - see previous note, will check with wound care. RLQ SHELBI drain to continuous low suction, no output noted since placed on suction.     IVF infusing per MAR, 1 L LR bolus given per order. Tolerating regular diet, denies nausea - passing gas, no BM.  Patient denies chest pain & SOB. No acute events. No distress noted. Bed in lowest position, call light within reach, frequent rounds made for safety.     WCTM.

## 2019-08-29 NOTE — PT/OT/SLP EVAL
Occupational Therapy   Evaluation    Name: Kishan Sánchez  MRN: [id number]  Admitting Diagnosis:  Liver cyst      Recommendations:     Discharge Recommendations: nursing facility, skilled  Discharge Equipment Recommendations:  (TBD)  Barriers to discharge:  Decreased caregiver support    Assessment:     Kishan Sánchez is a 54 y.o. male with a medical diagnosis of Liver cyst.  He presents with performance deficits including weakness, impaired endurance, impaired functional mobilty, impaired self care skills, impaired balance, pain, decreased safety awareness. Pt would continue to benefit from OT to increase functional independence and safety. Recommend SNF upon D/C pending progress as pt is unsafe to return home alone at current functional level.     Rehab Prognosis: Good; patient would benefit from acute skilled OT services to address these deficits and reach maximum level of function.       Plan:     Patient to be seen 3 x/week to address the above listed problems via self-care/home management, therapeutic activities, therapeutic exercises  · Plan of Care Expires: 09/29/19  · Plan of Care Reviewed with: patient    Subjective     Chief Complaint: None stated  Patient/Family Comments/goals: Increase independence    Occupational Profile:  Living Environment: Pt reports he lives with his niece in 1st floor apartment with tub/shower combo.  Previous level of function: Reports (I) with ADLs and mobility, retired  Equipment Used at Home:  none  Assistance upon Discharge: Niece works during the day and pt does not have assistance available    Pain/Comfort:  Pain Rating 1: (Did not rate)  Location - Orientation 1: (abdomen at rest; scrotum when attempting to sit/stand)  Pain Addressed 1: Cessation of Activity, Nurse notified    Patients cultural, spiritual, Anabaptism conflicts given the current situation: no    Objective:     Communicated with: RN prior to session. Patient found with HOB elevated with SHELBI drain,  peripheral IV upon OT entry to room.    General Precautions: Standard, fall   Orthopedic Precautions:N/A   Braces: N/A     Occupational Performance:    Bed Mobility:    · Pt went from supine to standing with Min A-- scooted self to EOB and used B UEs to go straight to standing position- pt was unable to tolerate sitting due to reporting scrotal pain; pt noted to be actively bleeding from scrotum and returned to supine with Min A    Functional Mobility/Transfers:  · Functional Mobility: Side step to HOB with Min A hand-held assist    Activities of Daily Living:  · Total A to don socks    Cognitive/Visual Perceptual:  Intact, although with unclear answers regarding history/PLOF at times    Physical Exam:  B UE ROM and MMT WFL  Unable to assess while sitting due to pain    AMPA 6 Click ADL:  AMPAC Total Score: 18    Treatment & Education:  OT eval; educated on OT role and POC  Education:    Patient left supine with all lines intact, call button in reach and RN present    GOALS:   Multidisciplinary Problems     Occupational Therapy Goals        Problem: Occupational Therapy Goal    Goal Priority Disciplines Outcome Interventions   Occupational Therapy Goal     OT, PT/OT Ongoing (interventions implemented as appropriate)    Description:  Goals to be met by: 7 days (9/5/19)     Patient will increase functional independence with ADLs by performing:    UE Dressing with Stand-by Assistance.  LE Dressing with Minimal Assistance.  Grooming while standing at sink with Contact Guard Assistance.  Toileting from toilet with Contact Guard Assistance for hygiene and clothing management.   Supine to sit with Stand-by Assistance.  Toilet transfer to toilet with Contact Guard Assistance.  Complete functional mobility household distance with CGA using AD as needed.                      History:     History reviewed. No pertinent past medical history.    History reviewed. No pertinent surgical history.    Time Tracking:     OT Date of  Treatment: 08/29/19  OT Start Time: 1145  OT Stop Time: 1202  OT Total Time (min): 17 min    Billable Minutes:Evaluation 17 minutes    IRVIN Ding  8/29/2019

## 2019-08-29 NOTE — PROGRESS NOTES
[facility]  General Surgery  Progress Note    Subjective:     History of Present Illness:  Kishan Sánchez is a 53 y.o. 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 This onset two days ago and has been getting progressively worse. It is associated with a foul odor. Workup in the ED demonstrates slight decrease in size of the fluid collection via CT, though a large collection is still present. He was transferred to [facility] for IR assessment and management. On arrival to [facility], patient denies CP, F/C, N/V, diarrhea, abdominal pain, urinary changes, hematochezia, hematemesis. Patient is AF, VSS.      Post-Op Info:  * No surgery found *         Interval History: No acute events overnight. IR yesterday with copious output from drain.     Medications:  Continuous Infusions:   sodium chloride 0.9% 50 mL/hr (08/28/19 1712)    dextrose 5 % and 0.45 % NaCl with KCl 20 mEq 100 mL/hr at 08/28/19 1113     Scheduled Meds:   potassium, sodium phosphates  2 packet Oral Once     PRN Meds:sodium chloride 0.9%, fentaNYL, sodium chloride 0.9%     Review of patient's allergies indicates:  No Known Allergies  Objective:     Vital Signs (Most Recent):  Temp: 98 °F (36.7 °C) (08/29/19 0630)  Pulse: 81 (08/29/19 0118)  Resp: 16 (08/29/19 0630)  BP: 115/62 (08/29/19 0630)  SpO2: 100 % (08/29/19 0630) Vital Signs (24h Range):  Temp:  [97.3 °F (36.3 °C)-98.2 °F (36.8 °C)] 98 °F (36.7 °C)  Pulse:  [66-81] 81  Resp:  [15-20] 16  SpO2:  [98 %-100 %] 100 %  BP: ()/(58-72) 115/62     Weight: 55.2 kg (121 lb 9.3 oz)  Body mass index is 20.87 kg/m².    Intake/Output - Last 3 Shifts       08/27 0700 - 08/28 0659 08/28 0700 - 08/29 0659 08/29 0700 - 08/30 0659    P.O.  320     I.V. (mL/kg)  325 (5.9)     IV Piggyback  1000     Total Intake(mL/kg)  1645 (29.9)     Urine (mL/kg/hr) 500 1050  (0.8)     Drains  2620     Other 440 400     Stool 0 0     Total Output 940 4070     Net -940 -2425            Stool Occurrence 0 x 0 x           Physical Exam   Constitutional: He appears well-developed and well-nourished. No distress.   HENT:   Head: Normocephalic and atraumatic.   Cardiovascular: Normal rate and regular rhythm.   Pulmonary/Chest: Effort normal. No respiratory distress.   Abdominal:   Soft, NTND  IR drain in place with purulent output       Significant Labs:  CBC:   Recent Labs   Lab 08/29/19  0345   WBC 7.20   RBC 3.50*   HGB 8.5*   HCT 27.1*   *   MCV 77*   MCH 24.3*   MCHC 31.4*     CMP:   Recent Labs   Lab 08/29/19  0345   *   CALCIUM 8.0*   ALBUMIN 1.2*   PROT 5.8*      K 4.0   CO2 25      BUN 26*   CREATININE 0.6   ALKPHOS 294*   ALT 55*   AST 91*   BILITOT 0.6       Significant Diagnostics:  I have reviewed all pertinent imaging results/findings within the past 24 hours.    Assessment/Plan:     * Liver cyst  54 y.o. male with a history of cystic hepatic mass s/p IR drain placement, presented to OSH due to leakage around drain and transferred to [facility] for IR assessment; currently with 8F in place     -reg diet  -OR planning for tomorrow versus weekend  -current drain to low continuous wall suction            Vinnie Archibald MD  General Surgery  [facility]

## 2019-08-29 NOTE — PROGRESS NOTES
Wound care follow-up  Unit nurse Amy requested wound care follow-up with patient- sacral area bleeding after walking with OT  The bleeding has stopped, wound bed easy to bleed, vascular. Barrier paste in place.  Discussed wearing ABD dressing over the area with disposable briefs to hold in place to prevent rubbing on sheets when he gets OOB.  Supplies at bedside. Discussed with patient and he verbalized understanding. Mr. Sánchez was very excited about having surgery Saturday and probably going home on Sunday.  We discussed possible surgery and making sure he was able to care for himself or had someone at home to assist in care at home.  Verbalized understanding.   Discussed with unit nurse Amy, verbalized understanding.   Nursing to continue care, wound care to follow-up prn.  B. Zainab Abel RN, [facility]  [id number]

## 2019-08-29 NOTE — PROGRESS NOTES
Dr. Archibald gave ok to switch grenade drain to SHELBI drain in order to put to continuous low suction.     While getting out of bed, PT noticed some bleeding from the scrotum, assessed scrotum and noticed small cut. Wound care was consulted yesterday, cleansed and applied barrier cream.     SHANT.

## 2019-08-30 ENCOUNTER — ANESTHESIA EVENT (OUTPATIENT)
Dept: SURGERY | Facility: HOSPITAL | Age: 54
DRG: 907 | End: 2019-08-30
Payer: MEDICAID

## 2019-08-30 LAB
ALBUMIN SERPL BCP-MCNC: 1.1 G/DL (ref 3.5–5.2)
ALP SERPL-CCNC: 306 U/L (ref 55–135)
ALT SERPL W/O P-5'-P-CCNC: 60 U/L (ref 10–44)
ANION GAP SERPL CALC-SCNC: 7 MMOL/L (ref 8–16)
AST SERPL-CCNC: 96 U/L (ref 10–40)
BASOPHILS # BLD AUTO: 0.02 K/UL (ref 0–0.2)
BASOPHILS NFR BLD: 0.3 % (ref 0–1.9)
BILIRUB SERPL-MCNC: 0.5 MG/DL (ref 0.1–1)
BUN SERPL-MCNC: 13 MG/DL (ref 6–20)
CALCIUM SERPL-MCNC: 7.8 MG/DL (ref 8.7–10.5)
CHLORIDE SERPL-SCNC: 109 MMOL/L (ref 95–110)
CO2 SERPL-SCNC: 22 MMOL/L (ref 23–29)
CREAT SERPL-MCNC: 0.6 MG/DL (ref 0.5–1.4)
DIFFERENTIAL METHOD: ABNORMAL
EOSINOPHIL # BLD AUTO: 0.1 K/UL (ref 0–0.5)
EOSINOPHIL NFR BLD: 1.4 % (ref 0–8)
ERYTHROCYTE [DISTWIDTH] IN BLOOD BY AUTOMATED COUNT: 18.5 % (ref 11.5–14.5)
EST. GFR  (AFRICAN AMERICAN): >60 ML/MIN/1.73 M^2
EST. GFR  (NON AFRICAN AMERICAN): >60 ML/MIN/1.73 M^2
GLUCOSE SERPL-MCNC: 94 MG/DL (ref 70–110)
HCT VFR BLD AUTO: 26.8 % (ref 40–54)
HGB BLD-MCNC: 8.4 G/DL (ref 14–18)
IMM GRANULOCYTES # BLD AUTO: 0.06 K/UL (ref 0–0.04)
IMM GRANULOCYTES NFR BLD AUTO: 1 % (ref 0–0.5)
LYMPHOCYTES # BLD AUTO: 1.8 K/UL (ref 1–4.8)
LYMPHOCYTES NFR BLD: 31.4 % (ref 18–48)
MAGNESIUM SERPL-MCNC: 1.8 MG/DL (ref 1.6–2.6)
MCH RBC QN AUTO: 25.8 PG (ref 27–31)
MCHC RBC AUTO-ENTMCNC: 31.3 G/DL (ref 32–36)
MCV RBC AUTO: 82 FL (ref 82–98)
MONOCYTES # BLD AUTO: 1 K/UL (ref 0.3–1)
MONOCYTES NFR BLD: 17.3 % (ref 4–15)
NEUTROPHILS # BLD AUTO: 2.8 K/UL (ref 1.8–7.7)
NEUTROPHILS NFR BLD: 48.6 % (ref 38–73)
NRBC BLD-RTO: 0 /100 WBC
PHOSPHATE SERPL-MCNC: 2 MG/DL (ref 2.7–4.5)
PLATELET # BLD AUTO: 516 K/UL (ref 150–350)
PMV BLD AUTO: 10 FL (ref 9.2–12.9)
POTASSIUM SERPL-SCNC: 4.5 MMOL/L (ref 3.5–5.1)
PROT SERPL-MCNC: 5.7 G/DL (ref 6–8.4)
RBC # BLD AUTO: 3.26 M/UL (ref 4.6–6.2)
SODIUM SERPL-SCNC: 138 MMOL/L (ref 136–145)
WBC # BLD AUTO: 5.73 K/UL (ref 3.9–12.7)

## 2019-08-30 PROCEDURE — 36415 COLL VENOUS BLD VENIPUNCTURE: CPT

## 2019-08-30 PROCEDURE — 20600001 HC STEP DOWN PRIVATE ROOM

## 2019-08-30 PROCEDURE — 25000003 PHARM REV CODE 250: Performed by: STUDENT IN AN ORGANIZED HEALTH CARE EDUCATION/TRAINING PROGRAM

## 2019-08-30 PROCEDURE — 83735 ASSAY OF MAGNESIUM: CPT

## 2019-08-30 PROCEDURE — 80053 COMPREHEN METABOLIC PANEL: CPT

## 2019-08-30 PROCEDURE — 97161 PT EVAL LOW COMPLEX 20 MIN: CPT

## 2019-08-30 PROCEDURE — 85025 COMPLETE CBC W/AUTO DIFF WBC: CPT

## 2019-08-30 PROCEDURE — 84100 ASSAY OF PHOSPHORUS: CPT

## 2019-08-30 PROCEDURE — 99232 SBSQ HOSP IP/OBS MODERATE 35: CPT | Mod: ,,, | Performed by: SURGERY

## 2019-08-30 PROCEDURE — 99232 PR SUBSEQUENT HOSPITAL CARE,LEVL II: ICD-10-PCS | Mod: ,,, | Performed by: SURGERY

## 2019-08-30 RX ADMIN — DEXTROSE MONOHYDRATE, SODIUM CHLORIDE, AND POTASSIUM CHLORIDE: 50; 4.5; 1.49 INJECTION, SOLUTION INTRAVENOUS at 09:08

## 2019-08-30 RX ADMIN — DEXTROSE MONOHYDRATE, SODIUM CHLORIDE, AND POTASSIUM CHLORIDE: 50; 4.5; 1.49 INJECTION, SOLUTION INTRAVENOUS at 11:08

## 2019-08-30 RX ADMIN — IBUPROFEN 600 MG: 600 TABLET ORAL at 12:08

## 2019-08-30 RX ADMIN — DEXTROSE MONOHYDRATE, SODIUM CHLORIDE, AND POTASSIUM CHLORIDE: 50; 4.5; 1.49 INJECTION, SOLUTION INTRAVENOUS at 12:08

## 2019-08-30 NOTE — SUBJECTIVE & OBJECTIVE
Interval History: No acute events. No significant clinical changes.     Medications:  Continuous Infusions:   sodium chloride 0.9% 50 mL/hr (08/28/19 1712)    dextrose 5 % and 0.45 % NaCl with KCl 20 mEq 100 mL/hr at 08/30/19 0020     Scheduled Meds:  PRN Meds:sodium chloride 0.9%, fentaNYL, sodium chloride 0.9%     Review of patient's allergies indicates:  No Known Allergies  Objective:     Vital Signs (Most Recent):  Temp: 97.5 °F (36.4 °C) (08/30/19 0449)  Pulse: 60 (08/30/19 0449)  Resp: 18 (08/30/19 0449)  BP: 120/78 (08/30/19 0449)  SpO2: 99 % (08/30/19 0449) Vital Signs (24h Range):  Temp:  [97.5 °F (36.4 °C)-99.4 °F (37.4 °C)] 97.5 °F (36.4 °C)  Pulse:  [60-89] 60  Resp:  [16-18] 18  SpO2:  [98 %-100 %] 99 %  BP: ()/(53-78) 120/78     Weight: 55.2 kg (121 lb 9.3 oz)  Body mass index is 20.87 kg/m².    Intake/Output - Last 3 Shifts       08/28 0700 - 08/29 0659 08/29 0700 - 08/30 0659 08/30 0700 - 08/31 0659    P.O. 320 650     I.V. (mL/kg) 325 (5.9) 2671 (48.5)     IV Piggyback 1000 1000     Total Intake(mL/kg) 1645 (29.9) 4321 (78.4)     Urine (mL/kg/hr) 1050 (0.8) 1100 (0.8)     Emesis/NG output  0     Drains 2620 10     Other 400      Stool 0 0     Total Output 4070 1110     Net -2425 +3211            Urine Occurrence  0 x     Stool Occurrence 0 x 0 x     Emesis Occurrence  0 x           Physical Exam   Constitutional: He appears well-developed and well-nourished. No distress.   HENT:   Head: Normocephalic and atraumatic.   Cardiovascular: Normal rate and regular rhythm.   Pulmonary/Chest: Effort normal. No respiratory distress.   Abdominal:   Soft, NTND  IR drain in place with purulent output       Significant Labs:  CBC:   Recent Labs   Lab 08/30/19  0415   WBC 5.73   RBC 3.26*   HGB 8.4*   HCT 26.8*   *   MCV 82   MCH 25.8*   MCHC 31.3*     CMP:   Recent Labs   Lab 08/30/19  0415   GLU 94   CALCIUM 7.8*   ALBUMIN 1.1*   PROT 5.7*      K 4.5   CO2 22*      BUN 13   CREATININE  0.6   ALKPHOS 306*   ALT 60*   AST 96*   BILITOT 0.5       Significant Diagnostics:  None further

## 2019-08-30 NOTE — PLAN OF CARE
Problem: Physical Therapy Goal  Goal: Physical Therapy Goal  Outcome: Outcome(s) achieved Date Met: 08/30/19  PT D/Abdi due to pt able to perform functional mobility. Rochelle Tang PT 8/30/19

## 2019-08-30 NOTE — PLAN OF CARE
Problem: Adult Inpatient Plan of Care  Goal: Plan of Care Review  Outcome: Revised  POC reviewed with patient who verbalized understanding. VSS on room air. AAOX4. Remains free of falls and injury. Pt up stand by assist. Linens changed and partial shower completed today.     Sacrum dressing changed. Rinsed scrotum off, applied more barrier cream and applied ABD pad and briefs. RLQ SHELBI drain to continuous low suction, output noted.     IVF infusing per MAR. Tolerating regular diet, denies nausea - passing gas, BM X 1 per pt. Orders for pt to be NPO midnight. Patient denies chest pain & SOB. SCDs in place. No acute events. No distress noted. Bed in lowest position, call light within reach, frequent rounds made for safety.      WCTM.

## 2019-08-30 NOTE — PT/OT/SLP EVAL
"Physical Therapy Evaluation/D/C Summary    Patient Name:  Kishan Sánchez   MRN:  [id number]    Recommendations:     Discharge Recommendations:  home   Discharge Equipment Recommendations: none   Barriers to discharge: None    Assessment:     Kishan Sánchez is a 54 y.o. male admitted with a medical diagnosis of Liver cyst.  He presents with the following impairments/functional limitations:  pain . D/C PT due to pt able to perform functional mobility.    Recent Surgery: Procedure(s) (LRB):  DRAINAGE, ABDOMEN, LAPAROSCOPIC-fenestration of liver cyst (N/A)      Plan:      (D/C PT due to pt able to perform functional mobility)   · Plan of Care Expires:  09/29/19    Subjective   "I don't need that" (pointed to the RW)  Pain/Comfort:  · Pain Rating 1: 2/10  · Location - Orientation 1: generalized  · Location 1: back  · Pain Addressed 1: Cessation of Activity, Nurse notified  · Pain Rating Post-Intervention 1: 2/10    Patients cultural, spiritual, Presybeterian conflicts given the current situation: no    Living Environment:  Pt lives with niece who works during the day in a 1st floor apt with no KASHIF  Prior to admission, patients level of function was independent  Equipment used at home: none.  Upon discharge, patient will have assistance from niece.    Objective:     Communicated with nurse prior to session.  Patient found up in chair with SHELBI drain, peripheral IV  upon PT entry to room.    General Precautions: Standard, fall   Orthopedic Precautions:N/A   Braces: N/A     Exams:  · Cognitive Exam:  Patient is oriented to Person, Place and Situation  · Sensation:    · -       Intact  light/touch B LE  · RLE ROM: WFL  · RLE Strength: WFL except hip flex 3-/5 (pain)  · LLE ROM: WFL  · LLE Strength: WFL except hip flex 3-/5 (pain)    Functional Mobility:  · Bed Mobility:     · Sit to Supine: modified independence  · Transfers:     · Sit to Stand:  modified independence with no AD  · Gait: 200ft with no AD with mod independent. " pt performed standing balance activities: high knee gait, ambulated backwards, and single leg stance with no instability noted.    AM-PAC 6 CLICK MOBILITY  Total Score:23     Patient left supine with all lines intact, call button in reach and nurse notified.    GOALS:   Multidisciplinary Problems     Physical Therapy Goals     Not on file          Multidisciplinary Problems (Resolved)        Problem: Physical Therapy Goal    Goal Priority Disciplines Outcome Goal Variances Interventions   Physical Therapy Goal   (Resolved)     PT, PT/OT Outcome(s) achieved                     History:     History reviewed. No pertinent past medical history.    History reviewed. No pertinent surgical history.    Time Tracking:     PT Received On: 08/30/19  PT Start Time: 1030     PT Stop Time: 1045  PT Total Time (min): 15 min     Billable Minutes: Evaluation 15      Rochelle Tang, PT  08/30/2019

## 2019-08-30 NOTE — ASSESSMENT & PLAN NOTE
54 y.o. male with a history of cystic hepatic mass s/p IR drain placement, presented to OSH due to leakage around drain and transferred to [facility] for IR assessment; currently with 8F in place     -reg diet  -OR tomorrow, NPO midnight   -current drain to low continuous wall suction

## 2019-08-30 NOTE — PROGRESS NOTES
Kure Beach (sister) [phone] - would like a doctor to call her today to provide her on more information on the surgery tomorrow. OR nurse stated she will pass this message on to Dr. Archibald.    [facility].

## 2019-08-30 NOTE — PROGRESS NOTES
[facility]  General Surgery  Progress Note    Subjective:     History of Present Illness:  Kishan Sánchez is a 53 y.o. 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 This onset two days ago and has been getting progressively worse. It is associated with a foul odor. Workup in the ED demonstrates slight decrease in size of the fluid collection via CT, though a large collection is still present. He was transferred to [facility] for IR assessment and management. On arrival to [facility], patient denies CP, F/C, N/V, diarrhea, abdominal pain, urinary changes, hematochezia, hematemesis. Patient is AF, VSS.      Post-Op Info:  Procedure(s) (LRB):  DRAINAGE, ABDOMEN, LAPAROSCOPIC-fenestration of liver cyst (N/A)         Interval History: No acute events. No significant clinical changes.     Medications:  Continuous Infusions:   sodium chloride 0.9% 50 mL/hr (08/28/19 1712)    dextrose 5 % and 0.45 % NaCl with KCl 20 mEq 100 mL/hr at 08/30/19 0020     Scheduled Meds:  PRN Meds:sodium chloride 0.9%, fentaNYL, sodium chloride 0.9%     Review of patient's allergies indicates:  No Known Allergies  Objective:     Vital Signs (Most Recent):  Temp: 97.5 °F (36.4 °C) (08/30/19 0449)  Pulse: 60 (08/30/19 0449)  Resp: 18 (08/30/19 0449)  BP: 120/78 (08/30/19 0449)  SpO2: 99 % (08/30/19 0449) Vital Signs (24h Range):  Temp:  [97.5 °F (36.4 °C)-99.4 °F (37.4 °C)] 97.5 °F (36.4 °C)  Pulse:  [60-89] 60  Resp:  [16-18] 18  SpO2:  [98 %-100 %] 99 %  BP: ()/(53-78) 120/78     Weight: 55.2 kg (121 lb 9.3 oz)  Body mass index is 20.87 kg/m².    Intake/Output - Last 3 Shifts       08/28 0700 - 08/29 0659 08/29 0700 - 08/30 0659 08/30 0700 - 08/31 0659    P.O. 320 650     I.V. (mL/kg) 325 (5.9) 2671 (48.5)     IV Piggyback 1000 1000     Total Intake(mL/kg) 1645 (29.9) 4321 (78.4)     Urine  (mL/kg/hr) 1050 (0.8) 1100 (0.8)     Emesis/NG output  0     Drains 2620 10     Other 400      Stool 0 0     Total Output 4070 1110     Net -2425 +3211            Urine Occurrence  0 x     Stool Occurrence 0 x 0 x     Emesis Occurrence  0 x           Physical Exam   Constitutional: He appears well-developed and well-nourished. No distress.   HENT:   Head: Normocephalic and atraumatic.   Cardiovascular: Normal rate and regular rhythm.   Pulmonary/Chest: Effort normal. No respiratory distress.   Abdominal:   Soft, NTND  IR drain in place with purulent output       Significant Labs:  CBC:   Recent Labs   Lab 08/30/19  0415   WBC 5.73   RBC 3.26*   HGB 8.4*   HCT 26.8*   *   MCV 82   MCH 25.8*   MCHC 31.3*     CMP:   Recent Labs   Lab 08/30/19 0415   GLU 94   CALCIUM 7.8*   ALBUMIN 1.1*   PROT 5.7*      K 4.5   CO2 22*      BUN 13   CREATININE 0.6   ALKPHOS 306*   ALT 60*   AST 96*   BILITOT 0.5       Significant Diagnostics:  None further    Assessment/Plan:     * Liver cyst  54 y.o. male with a history of cystic hepatic mass s/p IR drain placement, presented to OSH due to leakage around drain and transferred to [facility] for IR assessment; currently with 8F in place     -reg diet  -OR tomorrow, NPO midnight   -current drain to low continuous wall suction            Rod Gilbert MD  General Surgery  [facility]

## 2019-08-30 NOTE — ANESTHESIA PREPROCEDURE EVALUATION
[facility]  Anesthesia Pre-Operative Evaluation         Patient Name: Kishan Sánchez  YOB: 1965  MRN: [id number]    SUBJECTIVE:     Pre-operative evaluation for Procedure(s) (LRB):  DRAINAGE, ABDOMEN, LAPAROSCOPIC-fenestration of liver cyst (N/A)     08/30/2019    Kishan Sánchez is a 54 y.o. 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    Patient now presents for the above procedure(s).      LDA: None documented.       Peripheral IV - Single Lumen 08/27/19 1526 20 G Left Forearm (Active)   Site Assessment Dry;Intact;Clean;No redness;No swelling;No warmth;No drainage 8/30/2019  7:29 AM   Line Status Infusing 8/30/2019  7:29 AM   Dressing Status Dry;Clean;Intact 8/30/2019  7:29 AM   Dressing Intervention New dressing 8/27/2019  3:26 PM   Site Change Due 08/31/19 8/30/2019  7:29 AM   Reason Not Rotated Not due 8/30/2019  7:29 AM   Number of days: 2            Closed/Suction Drain 07/29/19 1037 Right Abdomen Bulb 8 Fr. (Active)   Site Description Reddened 8/30/2019  7:29 AM   Dressing Type Transparent 8/30/2019  7:29 AM   Dressing Status Dry;Clean;Intact 8/30/2019  7:29 AM   Dressing Intervention Dressing changed 8/28/2019 10:00 AM   Drainage Serous;Thin 8/30/2019  7:26 AM   Status Other (Comment) 8/30/2019  7:29 AM   Output (mL) 150 mL 8/30/2019  7:26 AM   Number of days: 31       Prev airway: None documented.    Drips: None documented.   sodium chloride 0.9% 50 mL/hr (08/28/19 1712)    dextrose 5 % and 0.45 % NaCl with KCl 20 mEq 100 mL/hr at 08/30/19 0020       Patient Active Problem List   Diagnosis    Generalized abdominal mass    Weight gain    Scrotal edema    Bilateral lower extremity edema    Alteration in skin integrity    Hospital discharge  follow-up    Abdominal cyst    Hepatic cyst    Liver cyst    Alteration in skin integrity due to moisture       Review of patient's allergies indicates:  No Known Allergies    Current Inpatient Medications:      No current facility-administered medications on file prior to encounter.      No current outpatient medications on file prior to encounter.       History reviewed. No pertinent surgical history.    Social History     Socioeconomic History    Marital status: Single     Spouse name: Not on file    Number of children: Not on file    Years of education: Not on file    Highest education level: Not on file   Occupational History    Not on file   Social Needs    Financial resource strain: Not on file    Food insecurity:     Worry: Not on file     Inability: Not on file    Transportation needs:     Medical: Not on file     Non-medical: Not on file   Tobacco Use    Smoking status: Never Smoker    Smokeless tobacco: Never Used   Substance and Sexual Activity    Alcohol use: Not Currently     Comment: quick drinking 6 years ago    Drug use: Never    Sexual activity: Not Currently     Partners: Female   Lifestyle    Physical activity:     Days per week: Not on file     Minutes per session: Not on file    Stress: Not on file   Relationships    Social connections:     Talks on phone: Not on file     Gets together: Not on file     Attends Buddhism service: Not on file     Active member of club or organization: Not on file     Attends meetings of clubs or organizations: Not on file     Relationship status: Not on file   Other Topics Concern    Not on file   Social History Narrative    Not on file       OBJECTIVE:     Vital Signs Range (Last 24H):  Temp:  [36.2 °C (97.1 °F)-37.1 °C (98.8 °F)]   Pulse:  [60-89]   Resp:  [16-18]   BP: ()/(53-78)   SpO2:  [98 %-100 %]       Significant Labs:  Lab Results   Component Value Date    WBC 5.73 08/30/2019    HGB 8.4 (L) 08/30/2019    HCT 26.8 (L)  08/30/2019     (H) 08/30/2019    ALT 60 (H) 08/30/2019    AST 96 (H) 08/30/2019     08/30/2019    K 4.5 08/30/2019     08/30/2019    CREATININE 0.6 08/30/2019    BUN 13 08/30/2019    CO2 22 (L) 08/30/2019    INR 1.4 (H) 08/28/2019    HGBA1C 4.9 07/26/2019       Diagnostic Studies: No relevant studies.    EKG:   Results for orders placed or performed during the hospital encounter of 07/26/19   EKG 12-lead    Collection Time: 07/26/19  5:23 PM    Narrative    Test Reason : R07.9,    Vent. Rate : 094 BPM     Atrial Rate : 094 BPM     P-R Int : 120 ms          QRS Dur : 082 ms      QT Int : 400 ms       P-R-T Axes : 046 037 107 degrees     QTc Int : 500 ms    Normal sinus rhythm  Nonspecific T wave abnormality  Abnormal ECG  When compared with ECG of 15-JUL-2019 19:20,  No significant change was found  Confirmed by OMER RAMEY MD ([id number]) on 7/27/2019 7:53:31 AM    Referred By: [email or web address]   SELF           Confirmed By:OMER RAMEY MD       2D ECHO:  TTE:  No results found for this or any previous visit.    JIMMY:  No results found for this or any previous visit.    ASSESSMENT/PLAN:       Anesthesia Evaluation    I have reviewed the Patient Summary Reports.     I have reviewed the Medications.     Review of Systems  Anesthesia Hx:  No problems with previous Anesthesia  History of prior surgery of interest to airway management or planning: Previous anesthesia: MAC   Hematology/Oncology:  Hematology Normal   Oncology Normal     EENT/Dental:EENT/Dental Normal   Cardiovascular:  Cardiovascular Normal     Pulmonary:  Pulmonary Normal    Renal/:  Renal/ Normal     Hepatic/GI:   Liver Disease,    Musculoskeletal:  Musculoskeletal Normal    Neurological:  Neurology Normal    Endocrine:  Endocrine Normal    Dermatological:  Skin Normal    Psych:  Psychiatric Normal           Physical Exam  General:  Malnutrition    Airway/Jaw/Neck:  Airway Findings: Mouth Opening: Normal General Airway Assessment: Adult   Mallampati: I  TM Distance: Normal, at least 6 cm  Jaw/Neck Findings:  Neck ROM: Normal ROM      Dental:  Dental Findings: Periodontal disease, Severe   Chest/Lungs:  Chest/Lungs Findings: Clear to auscultation     Heart/Vascular:  Heart Findings: Rate: Normal  Rhythm: Regular Rhythm  Sounds: Normal        Mental Status:  Mental Status Findings:  Cooperative, Alert and Oriented         Anesthesia Plan  Type of Anesthesia, risks & benefits discussed:  Anesthesia Type:  general  Patient's Preference:   Intra-op Monitoring Plan: standard ASA monitors  Intra-op Monitoring Plan Comments:   Post Op Pain Control Plan: multimodal analgesia, IV/PO Opioids PRN and per primary service following discharge from PACU  Post Op Pain Control Plan Comments:   Induction:   IV  Beta Blocker:  Patient is not currently on a Beta-Blocker (No further documentation required).       Informed Consent: Patient understands risks and agrees with Anesthesia plan.  Questions answered. Anesthesia consent signed with patient.  ASA Score: 3     Day of Surgery Review of History & Physical:    H&P update referred to the surgeon.         Ready For Surgery From Anesthesia Perspective.

## 2019-08-31 ENCOUNTER — ANESTHESIA (OUTPATIENT)
Dept: SURGERY | Facility: HOSPITAL | Age: 54
DRG: 907 | End: 2019-08-31
Payer: MEDICAID

## 2019-08-31 LAB
ALBUMIN SERPL BCP-MCNC: 1.1 G/DL (ref 3.5–5.2)
ALP SERPL-CCNC: 276 U/L (ref 55–135)
ALT SERPL W/O P-5'-P-CCNC: 50 U/L (ref 10–44)
ANION GAP SERPL CALC-SCNC: 5 MMOL/L (ref 8–16)
AST SERPL-CCNC: 65 U/L (ref 10–40)
BACTERIA SPEC AEROBE CULT: ABNORMAL
BACTERIA SPEC AEROBE CULT: ABNORMAL
BASOPHILS # BLD AUTO: 0.02 K/UL (ref 0–0.2)
BASOPHILS NFR BLD: 0.3 % (ref 0–1.9)
BILIRUB SERPL-MCNC: 0.4 MG/DL (ref 0.1–1)
BUN SERPL-MCNC: 10 MG/DL (ref 6–20)
CALCIUM SERPL-MCNC: 7.7 MG/DL (ref 8.7–10.5)
CHLORIDE SERPL-SCNC: 107 MMOL/L (ref 95–110)
CO2 SERPL-SCNC: 22 MMOL/L (ref 23–29)
CREAT SERPL-MCNC: 0.5 MG/DL (ref 0.5–1.4)
DIFFERENTIAL METHOD: ABNORMAL
EOSINOPHIL # BLD AUTO: 0.1 K/UL (ref 0–0.5)
EOSINOPHIL NFR BLD: 1.5 % (ref 0–8)
ERYTHROCYTE [DISTWIDTH] IN BLOOD BY AUTOMATED COUNT: 18.6 % (ref 11.5–14.5)
EST. GFR  (AFRICAN AMERICAN): >60 ML/MIN/1.73 M^2
EST. GFR  (NON AFRICAN AMERICAN): >60 ML/MIN/1.73 M^2
GLUCOSE SERPL-MCNC: 99 MG/DL (ref 70–110)
GRAM STN SPEC: NORMAL
GRAM STN SPEC: NORMAL
HCT VFR BLD AUTO: 28.7 % (ref 40–54)
HGB BLD-MCNC: 8.8 G/DL (ref 14–18)
HYPOCHROMIA BLD QL SMEAR: ABNORMAL
IMM GRANULOCYTES # BLD AUTO: 0.14 K/UL (ref 0–0.04)
IMM GRANULOCYTES NFR BLD AUTO: 2.3 % (ref 0–0.5)
LYMPHOCYTES # BLD AUTO: 1.5 K/UL (ref 1–4.8)
LYMPHOCYTES NFR BLD: 24.7 % (ref 18–48)
MAGNESIUM SERPL-MCNC: 1.6 MG/DL (ref 1.6–2.6)
MCH RBC QN AUTO: 24.9 PG (ref 27–31)
MCHC RBC AUTO-ENTMCNC: 30.7 G/DL (ref 32–36)
MCV RBC AUTO: 81 FL (ref 82–98)
MONOCYTES # BLD AUTO: 0.8 K/UL (ref 0.3–1)
MONOCYTES NFR BLD: 13.7 % (ref 4–15)
NEUTROPHILS # BLD AUTO: 3.5 K/UL (ref 1.8–7.7)
NEUTROPHILS NFR BLD: 57.5 % (ref 38–73)
NRBC BLD-RTO: 0 /100 WBC
PHOSPHATE SERPL-MCNC: 2 MG/DL (ref 2.7–4.5)
PLATELET # BLD AUTO: 487 K/UL (ref 150–350)
PLATELET BLD QL SMEAR: ABNORMAL
PMV BLD AUTO: 10 FL (ref 9.2–12.9)
POTASSIUM SERPL-SCNC: 4.5 MMOL/L (ref 3.5–5.1)
PROT SERPL-MCNC: 5.6 G/DL (ref 6–8.4)
RBC # BLD AUTO: 3.53 M/UL (ref 4.6–6.2)
SODIUM SERPL-SCNC: 134 MMOL/L (ref 136–145)
WBC # BLD AUTO: 6.08 K/UL (ref 3.9–12.7)

## 2019-08-31 PROCEDURE — 87070 CULTURE OTHR SPECIMN AEROBIC: CPT

## 2019-08-31 PROCEDURE — 27201423 OPTIME MED/SURG SUP & DEVICES STERILE SUPPLY: Performed by: SURGERY

## 2019-08-31 PROCEDURE — 83735 ASSAY OF MAGNESIUM: CPT

## 2019-08-31 PROCEDURE — 25000003 PHARM REV CODE 250: Performed by: STUDENT IN AN ORGANIZED HEALTH CARE EDUCATION/TRAINING PROGRAM

## 2019-08-31 PROCEDURE — 63600175 PHARM REV CODE 636 W HCPCS: Performed by: SURGERY

## 2019-08-31 PROCEDURE — 63600175 PHARM REV CODE 636 W HCPCS: Performed by: NURSE ANESTHETIST, CERTIFIED REGISTERED

## 2019-08-31 PROCEDURE — C1729 CATH, DRAINAGE: HCPCS | Performed by: SURGERY

## 2019-08-31 PROCEDURE — 36000709 HC OR TIME LEV III EA ADD 15 MIN: Performed by: SURGERY

## 2019-08-31 PROCEDURE — 87205 SMEAR GRAM STAIN: CPT

## 2019-08-31 PROCEDURE — D9220A PRA ANESTHESIA: Mod: CRNA,,, | Performed by: NURSE ANESTHETIST, CERTIFIED REGISTERED

## 2019-08-31 PROCEDURE — 87102 FUNGUS ISOLATION CULTURE: CPT

## 2019-08-31 PROCEDURE — 63600175 PHARM REV CODE 636 W HCPCS: Performed by: ANESTHESIOLOGY

## 2019-08-31 PROCEDURE — 87116 MYCOBACTERIA CULTURE: CPT

## 2019-08-31 PROCEDURE — 88305 TISSUE EXAM BY PATHOLOGIST: CPT | Performed by: PATHOLOGY

## 2019-08-31 PROCEDURE — 84100 ASSAY OF PHOSPHORUS: CPT

## 2019-08-31 PROCEDURE — 71000033 HC RECOVERY, INTIAL HOUR: Performed by: SURGERY

## 2019-08-31 PROCEDURE — 87186 SC STD MICRODIL/AGAR DIL: CPT

## 2019-08-31 PROCEDURE — S0020 INJECTION, BUPIVICAINE HYDRO: HCPCS | Performed by: SURGERY

## 2019-08-31 PROCEDURE — 47379 UNLISTED LAPS PX LIVER: CPT | Mod: ,,, | Performed by: SURGERY

## 2019-08-31 PROCEDURE — 63600175 PHARM REV CODE 636 W HCPCS: Performed by: STUDENT IN AN ORGANIZED HEALTH CARE EDUCATION/TRAINING PROGRAM

## 2019-08-31 PROCEDURE — 36000708 HC OR TIME LEV III 1ST 15 MIN: Performed by: SURGERY

## 2019-08-31 PROCEDURE — 87077 CULTURE AEROBIC IDENTIFY: CPT

## 2019-08-31 PROCEDURE — 87075 CULTR BACTERIA EXCEPT BLOOD: CPT

## 2019-08-31 PROCEDURE — 87076 CULTURE ANAEROBE IDENT EACH: CPT

## 2019-08-31 PROCEDURE — 20600001 HC STEP DOWN PRIVATE ROOM

## 2019-08-31 PROCEDURE — 37000008 HC ANESTHESIA 1ST 15 MINUTES: Performed by: SURGERY

## 2019-08-31 PROCEDURE — 85025 COMPLETE CBC W/AUTO DIFF WBC: CPT

## 2019-08-31 PROCEDURE — 87206 SMEAR FLUORESCENT/ACID STAI: CPT

## 2019-08-31 PROCEDURE — 25000003 PHARM REV CODE 250: Performed by: NURSE ANESTHETIST, CERTIFIED REGISTERED

## 2019-08-31 PROCEDURE — 36415 COLL VENOUS BLD VENIPUNCTURE: CPT

## 2019-08-31 PROCEDURE — 47379 PR LAPAROSCOPIC DRAINAGE OF LIVER CYST/ABSCESS: ICD-10-PCS | Mod: ,,, | Performed by: SURGERY

## 2019-08-31 PROCEDURE — 99232 SBSQ HOSP IP/OBS MODERATE 35: CPT | Mod: 57,,, | Performed by: SURGERY

## 2019-08-31 PROCEDURE — D9220A PRA ANESTHESIA: ICD-10-PCS | Mod: CRNA,,, | Performed by: NURSE ANESTHETIST, CERTIFIED REGISTERED

## 2019-08-31 PROCEDURE — D9220A PRA ANESTHESIA: Mod: ANES,,, | Performed by: ANESTHESIOLOGY

## 2019-08-31 PROCEDURE — 87015 SPECIMEN INFECT AGNT CONCNTJ: CPT

## 2019-08-31 PROCEDURE — 88305 TISSUE SPECIMEN TO PATHOLOGY - SURGERY: ICD-10-PCS | Mod: 26,,, | Performed by: PATHOLOGY

## 2019-08-31 PROCEDURE — 99232 PR SUBSEQUENT HOSPITAL CARE,LEVL II: ICD-10-PCS | Mod: 57,,, | Performed by: SURGERY

## 2019-08-31 PROCEDURE — 25000003 PHARM REV CODE 250: Performed by: SURGERY

## 2019-08-31 PROCEDURE — 63600175 PHARM REV CODE 636 W HCPCS

## 2019-08-31 PROCEDURE — 37000009 HC ANESTHESIA EA ADD 15 MINS: Performed by: SURGERY

## 2019-08-31 PROCEDURE — 63600175 PHARM REV CODE 636 W HCPCS: Performed by: RADIOLOGY

## 2019-08-31 PROCEDURE — 80053 COMPREHEN METABOLIC PANEL: CPT

## 2019-08-31 PROCEDURE — D9220A PRA ANESTHESIA: ICD-10-PCS | Mod: ANES,,, | Performed by: ANESTHESIOLOGY

## 2019-08-31 RX ORDER — HYDROMORPHONE HYDROCHLORIDE 1 MG/ML
0.2 INJECTION, SOLUTION INTRAMUSCULAR; INTRAVENOUS; SUBCUTANEOUS EVERY 5 MIN PRN
Status: DISCONTINUED | OUTPATIENT
Start: 2019-08-31 | End: 2019-08-31 | Stop reason: HOSPADM

## 2019-08-31 RX ORDER — ACETAMINOPHEN 10 MG/ML
INJECTION, SOLUTION INTRAVENOUS
Status: DISCONTINUED | OUTPATIENT
Start: 2019-08-31 | End: 2019-08-31

## 2019-08-31 RX ORDER — OXYCODONE AND ACETAMINOPHEN 10; 325 MG/1; MG/1
1 TABLET ORAL EVERY 4 HOURS PRN
Status: DISCONTINUED | OUTPATIENT
Start: 2019-08-31 | End: 2019-09-05

## 2019-08-31 RX ORDER — OXYCODONE AND ACETAMINOPHEN 5; 325 MG/1; MG/1
1 TABLET ORAL EVERY 4 HOURS PRN
Status: DISCONTINUED | OUTPATIENT
Start: 2019-08-31 | End: 2019-09-05

## 2019-08-31 RX ORDER — MIDAZOLAM HYDROCHLORIDE 1 MG/ML
INJECTION, SOLUTION INTRAMUSCULAR; INTRAVENOUS
Status: DISCONTINUED | OUTPATIENT
Start: 2019-08-31 | End: 2019-08-31

## 2019-08-31 RX ORDER — ROCURONIUM BROMIDE 10 MG/ML
INJECTION, SOLUTION INTRAVENOUS
Status: DISCONTINUED | OUTPATIENT
Start: 2019-08-31 | End: 2019-08-31

## 2019-08-31 RX ORDER — SUCCINYLCHOLINE CHLORIDE 20 MG/ML
INJECTION INTRAMUSCULAR; INTRAVENOUS
Status: DISCONTINUED | OUTPATIENT
Start: 2019-08-31 | End: 2019-08-31

## 2019-08-31 RX ORDER — CEFAZOLIN SODIUM 1 G/3ML
INJECTION, POWDER, FOR SOLUTION INTRAMUSCULAR; INTRAVENOUS
Status: DISCONTINUED | OUTPATIENT
Start: 2019-08-31 | End: 2019-08-31

## 2019-08-31 RX ORDER — OXYCODONE AND ACETAMINOPHEN 10; 325 MG/1; MG/1
TABLET ORAL
Status: DISPENSED
Start: 2019-08-31 | End: 2019-08-31

## 2019-08-31 RX ORDER — SODIUM CHLORIDE 9 MG/ML
10 INJECTION, SOLUTION INTRAVENOUS CONTINUOUS
Status: DISCONTINUED | OUTPATIENT
Start: 2019-08-31 | End: 2019-08-31

## 2019-08-31 RX ORDER — PROPOFOL 10 MG/ML
VIAL (ML) INTRAVENOUS
Status: DISCONTINUED | OUTPATIENT
Start: 2019-08-31 | End: 2019-08-31

## 2019-08-31 RX ORDER — PHENYLEPHRINE HYDROCHLORIDE 10 MG/ML
INJECTION INTRAVENOUS
Status: DISCONTINUED | OUTPATIENT
Start: 2019-08-31 | End: 2019-08-31

## 2019-08-31 RX ORDER — SODIUM CHLORIDE 0.9 % (FLUSH) 0.9 %
3 SYRINGE (ML) INJECTION
Status: DISCONTINUED | OUTPATIENT
Start: 2019-08-31 | End: 2019-09-05 | Stop reason: HOSPADM

## 2019-08-31 RX ORDER — FENTANYL CITRATE 50 UG/ML
INJECTION, SOLUTION INTRAMUSCULAR; INTRAVENOUS
Status: DISCONTINUED | OUTPATIENT
Start: 2019-08-31 | End: 2019-08-31

## 2019-08-31 RX ORDER — BUPIVACAINE HYDROCHLORIDE 5 MG/ML
INJECTION, SOLUTION EPIDURAL; INTRACAUDAL
Status: DISCONTINUED | OUTPATIENT
Start: 2019-08-31 | End: 2019-08-31 | Stop reason: HOSPADM

## 2019-08-31 RX ORDER — HYDROMORPHONE HYDROCHLORIDE 1 MG/ML
INJECTION, SOLUTION INTRAMUSCULAR; INTRAVENOUS; SUBCUTANEOUS
Status: COMPLETED
Start: 2019-08-31 | End: 2019-08-31

## 2019-08-31 RX ADMIN — PIPERACILLIN AND TAZOBACTAM 4.5 G: 4; .5 INJECTION, POWDER, LYOPHILIZED, FOR SOLUTION INTRAVENOUS; PARENTERAL at 06:08

## 2019-08-31 RX ADMIN — SODIUM CHLORIDE: 0.9 INJECTION, SOLUTION INTRAVENOUS at 08:08

## 2019-08-31 RX ADMIN — ACETAMINOPHEN 1000 MG: 10 INJECTION, SOLUTION INTRAVENOUS at 09:08

## 2019-08-31 RX ADMIN — DEXTROSE MONOHYDRATE, SODIUM CHLORIDE, AND POTASSIUM CHLORIDE: 50; 4.5; 1.49 INJECTION, SOLUTION INTRAVENOUS at 11:08

## 2019-08-31 RX ADMIN — SODIUM CHLORIDE 10 ML/HR: 0.9 INJECTION, SOLUTION INTRAVENOUS at 06:08

## 2019-08-31 RX ADMIN — FENTANYL CITRATE 100 MCG: 50 INJECTION, SOLUTION INTRAMUSCULAR; INTRAVENOUS at 08:08

## 2019-08-31 RX ADMIN — CEFAZOLIN 2 G: 330 INJECTION, POWDER, FOR SOLUTION INTRAMUSCULAR; INTRAVENOUS at 08:08

## 2019-08-31 RX ADMIN — ROCURONIUM BROMIDE 40 MG: 10 INJECTION, SOLUTION INTRAVENOUS at 08:08

## 2019-08-31 RX ADMIN — HYDROMORPHONE HYDROCHLORIDE 0.2 MG: 1 INJECTION, SOLUTION INTRAMUSCULAR; INTRAVENOUS; SUBCUTANEOUS at 10:08

## 2019-08-31 RX ADMIN — OXYCODONE HYDROCHLORIDE AND ACETAMINOPHEN 1 TABLET: 5; 325 TABLET ORAL at 05:08

## 2019-08-31 RX ADMIN — SUCCINYLCHOLINE CHLORIDE 120 MG: 20 INJECTION, SOLUTION INTRAMUSCULAR; INTRAVENOUS at 08:08

## 2019-08-31 RX ADMIN — SODIUM PHOSPHATE, MONOBASIC, MONOHYDRATE 30 MMOL: 276; 142 INJECTION, SOLUTION INTRAVENOUS at 12:08

## 2019-08-31 RX ADMIN — OXYCODONE HYDROCHLORIDE AND ACETAMINOPHEN 1 TABLET: 10; 325 TABLET ORAL at 10:08

## 2019-08-31 RX ADMIN — PHENYLEPHRINE HYDROCHLORIDE 100 MCG: 10 INJECTION INTRAVENOUS at 08:08

## 2019-08-31 RX ADMIN — MIDAZOLAM HYDROCHLORIDE 2 MG: 1 INJECTION, SOLUTION INTRAMUSCULAR; INTRAVENOUS at 08:08

## 2019-08-31 RX ADMIN — ROCURONIUM BROMIDE 5 MG: 10 INJECTION, SOLUTION INTRAVENOUS at 08:08

## 2019-08-31 RX ADMIN — DEXTROSE MONOHYDRATE, SODIUM CHLORIDE, AND POTASSIUM CHLORIDE: 50; 4.5; 1.49 INJECTION, SOLUTION INTRAVENOUS at 05:08

## 2019-08-31 RX ADMIN — PROPOFOL 150 MG: 10 INJECTION, EMULSION INTRAVENOUS at 08:08

## 2019-08-31 RX ADMIN — PIPERACILLIN AND TAZOBACTAM 4.5 G: 4; .5 INJECTION, POWDER, LYOPHILIZED, FOR SOLUTION INTRAVENOUS; PARENTERAL at 10:08

## 2019-08-31 RX ADMIN — DEXTROSE MONOHYDRATE, SODIUM CHLORIDE, AND POTASSIUM CHLORIDE: 50; 4.5; 1.49 INJECTION, SOLUTION INTRAVENOUS at 09:08

## 2019-08-31 NOTE — NURSING
AM care performed with hibiclense bath; fresh gown/sock applied; skin care performed to scrotal area with barrier cream; BRENDON hose replaced; SCD's in use; IVF H'L'd per Preop instructions; pt denies needs; NAD noted; MARK

## 2019-08-31 NOTE — ASSESSMENT & PLAN NOTE
54 y.o. male with a history of cystic hepatic mass s/p IR drain placement, presented to OSH due to leakage around drain and transferred to [facility] for IR assessment; currently with 8F in place     -reg diet  -OR today

## 2019-08-31 NOTE — TRANSFER OF CARE
"Anesthesia Transfer of Care Note    Patient: Kishan Sánchez    Procedure(s) Performed: Procedure(s) (LRB):  DRAINAGE, ABDOMEN, LAPAROSCOPIC-fenestration of liver cyst (N/A)  FENESTRATION of liver cyst.    Patient location: PACU    Anesthesia Type: general    Transport from OR: Transported from OR on 6-10 L/min O2 by face mask with adequate spontaneous ventilation    Post pain: adequate analgesia    Post assessment: no apparent anesthetic complications and tolerated procedure well    Post vital signs: stable    Level of consciousness: awake and alert    Nausea/Vomiting: no nausea/vomiting    Complications: none    Transfer of care protocol was followed      Last vitals:   Visit Vitals  BP (!) 141/91 (BP Location: Right arm, Patient Position: Lying)   Pulse 65   Temp 36.5 °C (97.7 °F) (Temporal)   Resp 16   Ht 5' 4" (1.626 m)   Wt 55.2 kg (121 lb 9.3 oz)   SpO2 100%   BMI 20.87 kg/m²     "

## 2019-08-31 NOTE — PLAN OF CARE
Problem: Adult Inpatient Plan of Care  Goal: Plan of Care Review  Outcome: Ongoing (interventions implemented as appropriate)  POC reviewed with patient. Pt. Arrived from surgery at 1130. VSS. No complaints of pain. 2 SHELBI drains to R. ABD. Lower SHELBI drain leaking at site, cleansed with NS, applied gauze and ABD pad. Dressing changed twice during shift to SHELBI drains. Foam dressing to sacrum intact. ABD pad, mesh underwear in place to perineum. 2 lap sites to ABD intact with steri strips. Pt. Voided per urinal. No BM. Harsh hose, scd's in place. IVF infusing. Clear liquid diet maintained with no complaint of nausea. Tele monitor in place. Call light within reach. WCTM.

## 2019-08-31 NOTE — PROGRESS NOTES
[facility]  General Surgery  Progress Note    Subjective:     History of Present Illness:  Kishan Sánchez is a 53 y.o. 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 This onset two days ago and has been getting progressively worse. It is associated with a foul odor. Workup in the ED demonstrates slight decrease in size of the fluid collection via CT, though a large collection is still present. [place] [facility] [place]. [place] [facility] [place] patient denies CP, F/C, N/V, diarrhea, abdominal pain, urinary changes, hematochezia, hematemesis. Patient is AF, VSS.      Post-Op Info:  Procedure(s) (LRB):  DRAINAGE, ABDOMEN, LAPAROSCOPIC-fenestration of liver cyst (N/A)   Day of Surgery     Interval History: No acute events overnight.     Medications:  Continuous Infusions:   sodium chloride 0.9% 50 mL/hr (08/28/19 1712)    sodium chloride 0.9% 10 mL/hr (08/31/19 0657)    dextrose 5 % and 0.45 % NaCl with KCl 20 mEq Stopped (08/31/19 0624)     Scheduled Meds:   sodium phosphate IVPB  30 mmol Intravenous Once     PRN Meds:sodium chloride 0.9%, fentaNYL, sodium chloride 0.9%     Review of patient's allergies indicates:  No Known Allergies  Objective:     Vital Signs (Most Recent):  Temp: 98.8 °F (37.1 °C) (08/31/19 0646)  Pulse: 77 (08/31/19 0646)  Resp: 19 (08/31/19 0646)  BP: 132/88 (08/31/19 0647)  SpO2: 98 % (08/31/19 0646) Vital Signs (24h Range):  Temp:  [97.1 °F (36.2 °C)-99.4 °F (37.4 °C)] 98.8 °F (37.1 °C)  Pulse:  [61-87] 77  Resp:  [16-19] 19  SpO2:  [94 %-100 %] 98 %  BP: (107-132)/(67-88) 132/88     Weight: 55.2 kg (121 lb 9.3 oz)  Body mass index is 20.87 kg/m².    Intake/Output - Last 3 Shifts       08/29 0700 - 08/30 0659 08/30 0700 - 08/31 0659 08/31 0700 - 09/01 0659    P.O. 650 350     I.V. (mL/kg) 2671 (48.5) 2562  (46.5)     IV Piggyback 1000      Total Intake(mL/kg) 4321 (78.4) 2910 (52.8)     Urine (mL/kg/hr) 1100 (0.8) 2200 (1.7)     Emesis/NG output 0      Drains 10 550     Other       Stool 0 0     Total Output 1110 2750     Net +3211 +160            Urine Occurrence 0 x 1 x     Stool Occurrence 0 x 1 x     Emesis Occurrence 0 x            Physical Exam   Constitutional: He appears well-developed and well-nourished. No distress.   HENT:   Head: Normocephalic and atraumatic.   Cardiovascular: Normal rate and regular rhythm.   Pulmonary/Chest: Effort normal. No respiratory distress.   Abdominal:   Soft, NTND  IR drain in place with purulent output       Significant Labs:  CBC:   Recent Labs   Lab 08/31/19  0404   WBC 6.08   RBC 3.53*   HGB 8.8*   HCT 28.7*   *   MCV 81*   MCH 24.9*   MCHC 30.7*     CMP:   Recent Labs   Lab 08/31/19  0404   GLU 99   CALCIUM 7.7*   ALBUMIN 1.1*   PROT 5.6*   *   K 4.5   CO2 22*      BUN 10   CREATININE 0.5   ALKPHOS 276*   ALT 50*   AST 65*   BILITOT 0.4       Significant Diagnostics:  I have reviewed all pertinent imaging results/findings within the past 24 hours.    Assessment/Plan:     * Liver cyst  54 y.o. male with a history of cystic hepatic mass s/p IR drain placement, presented to OSH due to leakage around drain and transferred to [facility] for IR assessment; currently with 8F in place     -reg diet  -OR today            Vinnie Archibald MD  General Surgery  [facility]

## 2019-08-31 NOTE — OP NOTE
[facility]  Surgery Department  Operative Note    SUMMARY     Date of Procedure: 8/31/2019     Procedure: Procedure(s) (LRB):  DRAINAGE, ABDOMEN, LAPAROSCOPIC-fenestration of liver cyst (N/A)  FENESTRATION of liver cyst.  drainage of liver abscess    Surgeon(s) and Role:     * Byron Morley MD - Primary     * Avery Campos MD - Resident - Assisting        Pre-Operative Diagnosis: Liver cyst [K76.89]    Post-Operative Diagnosis: Post-Op Diagnosis Codes:     * Liver abscess      Anesthesia: General    Technical Procedures Used: After informed consent, pt was brought to the OR, placed supine, GETA was induced, the abdomen was prepped and draped, and a timeout was completed.  Entry to the abdomen was accomplished in the RUQ using Optiview technique with a 5mm trochar.  Two additional 5mm trochars were placed at the umbilicus and right abdomen after insulflating to 15mmHg.  The RUQ trochar was upsized to an 11mm trochar.  The prior drain hole in the cystic cavity was identified by leaking pus.  Using a ligasure the hepatic abscess wall was excised widely revealing a massive abscess cavity.  Pus was evacuated and sent for culture.  The cavity and abdomen was irrigated and all effluent was evacuated.  Abscess wall was removed from the abdomen through an endocatch bag placed in the RUQ incision. A 19F and 24F Mike drains were brought in through the RUQ trochar and prior drain site and placed within the abscess cavity.  These were secured to the skin with silk sutures.  Pneumoperitoneum was allowed to evacuate after the trochars were removed.  Skin was closed with 4-0 moncryl. Pt tolerated the procedure without apparent complication, was extubated, and transported to PACU in stable condition.  All counts were correct.      Description of the Findings of the Procedure: Laparoscopic fenestration of massive hepatic abscess    Significant Surgical Tasks Conducted by the Assistant(s), if Applicable:  Assist    Complications: No    Estimated Blood Loss (EBL): 20cc           Implants: * No implants in log *    Specimens:   Specimen (12h ago, onward)    Start     Ordered    08/31/19 0934  Specimen to Pathology - Surgery  Once     Comments:  1. Liver abscess- perm     Start Status     08/31/19 0934 Needs to be Collected Order ID: [id number]       08/31/19 0933                  Condition: Good    Disposition: PACU - hemodynamically stable.    Attestation: I was present and scrubbed for the entire procedure.

## 2019-08-31 NOTE — PLAN OF CARE
Pt resting comfortably.    Call light in reach.    No questions or concerns at this time.    No family present; added niece to text message alerts; pt did not wish to call niece to let her know he is going into surgery

## 2019-08-31 NOTE — SUBJECTIVE & OBJECTIVE
Interval History: No acute events overnight.     Medications:  Continuous Infusions:   sodium chloride 0.9% 50 mL/hr (08/28/19 1712)    sodium chloride 0.9% 10 mL/hr (08/31/19 0657)    dextrose 5 % and 0.45 % NaCl with KCl 20 mEq Stopped (08/31/19 0624)     Scheduled Meds:   sodium phosphate IVPB  30 mmol Intravenous Once     PRN Meds:sodium chloride 0.9%, fentaNYL, sodium chloride 0.9%     Review of patient's allergies indicates:  No Known Allergies  Objective:     Vital Signs (Most Recent):  Temp: 98.8 °F (37.1 °C) (08/31/19 0646)  Pulse: 77 (08/31/19 0646)  Resp: 19 (08/31/19 0646)  BP: 132/88 (08/31/19 0647)  SpO2: 98 % (08/31/19 0646) Vital Signs (24h Range):  Temp:  [97.1 °F (36.2 °C)-99.4 °F (37.4 °C)] 98.8 °F (37.1 °C)  Pulse:  [61-87] 77  Resp:  [16-19] 19  SpO2:  [94 %-100 %] 98 %  BP: (107-132)/(67-88) 132/88     Weight: 55.2 kg (121 lb 9.3 oz)  Body mass index is 20.87 kg/m².    Intake/Output - Last 3 Shifts       08/29 0700 - 08/30 0659 08/30 0700 - 08/31 0659 08/31 0700 - 09/01 0659    P.O. 650 350     I.V. (mL/kg) 2671 (48.5) 2560 (46.5)     IV Piggyback 1000      Total Intake(mL/kg) 4321 (78.4) 2910 (52.8)     Urine (mL/kg/hr) 1100 (0.8) 2200 (1.7)     Emesis/NG output 0      Drains 10 550     Other       Stool 0 0     Total Output 1110 2750     Net +3211 +160            Urine Occurrence 0 x 1 x     Stool Occurrence 0 x 1 x     Emesis Occurrence 0 x            Physical Exam   Constitutional: He appears well-developed and well-nourished. No distress.   HENT:   Head: Normocephalic and atraumatic.   Cardiovascular: Normal rate and regular rhythm.   Pulmonary/Chest: Effort normal. No respiratory distress.   Abdominal:   Soft, NTND  IR drain in place with purulent output       Significant Labs:  CBC:   Recent Labs   Lab 08/31/19  0404   WBC 6.08   RBC 3.53*   HGB 8.8*   HCT 28.7*   *   MCV 81*   MCH 24.9*   MCHC 30.7*     CMP:   Recent Labs   Lab 08/31/19  0404   GLU 99   CALCIUM 7.7*   ALBUMIN  1.1*   PROT 5.6*   *   K 4.5   CO2 22*      BUN 10   CREATININE 0.5   ALKPHOS 276*   ALT 50*   AST 65*   BILITOT 0.4       Significant Diagnostics:  I have reviewed all pertinent imaging results/findings within the past 24 hours.

## 2019-08-31 NOTE — NURSING TRANSFER
Nursing Transfer Note      8/31/2019     Transfer to 1051 from PACU    Transfer via stretcher    Transfer with cardiac monitoring    Transported by PACU PCT    Medicines sent:     Chart send with patient: Yes    Notified:

## 2019-09-01 LAB
ALBUMIN SERPL BCP-MCNC: 1.2 G/DL (ref 3.5–5.2)
ALP SERPL-CCNC: 229 U/L (ref 55–135)
ALT SERPL W/O P-5'-P-CCNC: 34 U/L (ref 10–44)
ANION GAP SERPL CALC-SCNC: 5 MMOL/L (ref 8–16)
AST SERPL-CCNC: 30 U/L (ref 10–40)
BASOPHILS # BLD AUTO: 0.02 K/UL (ref 0–0.2)
BASOPHILS NFR BLD: 0.2 % (ref 0–1.9)
BILIRUB SERPL-MCNC: 0.6 MG/DL (ref 0.1–1)
BUN SERPL-MCNC: 7 MG/DL (ref 6–20)
CALCIUM SERPL-MCNC: 7.8 MG/DL (ref 8.7–10.5)
CHLORIDE SERPL-SCNC: 105 MMOL/L (ref 95–110)
CO2 SERPL-SCNC: 25 MMOL/L (ref 23–29)
CREAT SERPL-MCNC: 0.6 MG/DL (ref 0.5–1.4)
DIFFERENTIAL METHOD: ABNORMAL
EOSINOPHIL # BLD AUTO: 0.1 K/UL (ref 0–0.5)
EOSINOPHIL NFR BLD: 1 % (ref 0–8)
ERYTHROCYTE [DISTWIDTH] IN BLOOD BY AUTOMATED COUNT: 18.4 % (ref 11.5–14.5)
EST. GFR  (AFRICAN AMERICAN): >60 ML/MIN/1.73 M^2
EST. GFR  (NON AFRICAN AMERICAN): >60 ML/MIN/1.73 M^2
GLUCOSE SERPL-MCNC: 99 MG/DL (ref 70–110)
HCT VFR BLD AUTO: 27.4 % (ref 40–54)
HGB BLD-MCNC: 8.4 G/DL (ref 14–18)
IMM GRANULOCYTES # BLD AUTO: 0.13 K/UL (ref 0–0.04)
IMM GRANULOCYTES NFR BLD AUTO: 1.4 % (ref 0–0.5)
LYMPHOCYTES # BLD AUTO: 1.1 K/UL (ref 1–4.8)
LYMPHOCYTES NFR BLD: 10.9 % (ref 18–48)
MCH RBC QN AUTO: 24.9 PG (ref 27–31)
MCHC RBC AUTO-ENTMCNC: 30.7 G/DL (ref 32–36)
MCV RBC AUTO: 81 FL (ref 82–98)
MONOCYTES # BLD AUTO: 0.6 K/UL (ref 0.3–1)
MONOCYTES NFR BLD: 6.1 % (ref 4–15)
NEUTROPHILS # BLD AUTO: 7.7 K/UL (ref 1.8–7.7)
NEUTROPHILS NFR BLD: 80.4 % (ref 38–73)
NRBC BLD-RTO: 0 /100 WBC
PLATELET # BLD AUTO: 459 K/UL (ref 150–350)
PMV BLD AUTO: 10.5 FL (ref 9.2–12.9)
POTASSIUM SERPL-SCNC: 4.9 MMOL/L (ref 3.5–5.1)
PROT SERPL-MCNC: 5.5 G/DL (ref 6–8.4)
RBC # BLD AUTO: 3.38 M/UL (ref 4.6–6.2)
SODIUM SERPL-SCNC: 135 MMOL/L (ref 136–145)
WBC # BLD AUTO: 9.6 K/UL (ref 3.9–12.7)

## 2019-09-01 PROCEDURE — 63600175 PHARM REV CODE 636 W HCPCS: Performed by: STUDENT IN AN ORGANIZED HEALTH CARE EDUCATION/TRAINING PROGRAM

## 2019-09-01 PROCEDURE — 80053 COMPREHEN METABOLIC PANEL: CPT

## 2019-09-01 PROCEDURE — 20600001 HC STEP DOWN PRIVATE ROOM

## 2019-09-01 PROCEDURE — 85025 COMPLETE CBC W/AUTO DIFF WBC: CPT

## 2019-09-01 PROCEDURE — 25000003 PHARM REV CODE 250: Performed by: STUDENT IN AN ORGANIZED HEALTH CARE EDUCATION/TRAINING PROGRAM

## 2019-09-01 PROCEDURE — S0028 INJECTION, FAMOTIDINE, 20 MG: HCPCS | Performed by: STUDENT IN AN ORGANIZED HEALTH CARE EDUCATION/TRAINING PROGRAM

## 2019-09-01 PROCEDURE — 36415 COLL VENOUS BLD VENIPUNCTURE: CPT

## 2019-09-01 RX ORDER — ENOXAPARIN SODIUM 100 MG/ML
40 INJECTION SUBCUTANEOUS EVERY 24 HOURS
Status: DISCONTINUED | OUTPATIENT
Start: 2019-09-01 | End: 2019-09-05 | Stop reason: HOSPADM

## 2019-09-01 RX ORDER — FAMOTIDINE 10 MG/ML
20 INJECTION INTRAVENOUS 2 TIMES DAILY
Status: DISCONTINUED | OUTPATIENT
Start: 2019-09-01 | End: 2019-09-03

## 2019-09-01 RX ADMIN — DEXTROSE MONOHYDRATE, SODIUM CHLORIDE, AND POTASSIUM CHLORIDE: 50; 4.5; 1.49 INJECTION, SOLUTION INTRAVENOUS at 05:09

## 2019-09-01 RX ADMIN — FAMOTIDINE 20 MG: 10 INJECTION, SOLUTION INTRAVENOUS at 08:09

## 2019-09-01 RX ADMIN — PIPERACILLIN AND TAZOBACTAM 4.5 G: 4; .5 INJECTION, POWDER, LYOPHILIZED, FOR SOLUTION INTRAVENOUS; PARENTERAL at 10:09

## 2019-09-01 RX ADMIN — ENOXAPARIN SODIUM 40 MG: 100 INJECTION SUBCUTANEOUS at 05:09

## 2019-09-01 RX ADMIN — DEXTROSE MONOHYDRATE, SODIUM CHLORIDE, AND POTASSIUM CHLORIDE: 50; 4.5; 1.49 INJECTION, SOLUTION INTRAVENOUS at 04:09

## 2019-09-01 RX ADMIN — FAMOTIDINE 20 MG: 10 INJECTION, SOLUTION INTRAVENOUS at 10:09

## 2019-09-01 RX ADMIN — OXYCODONE HYDROCHLORIDE AND ACETAMINOPHEN 1 TABLET: 5; 325 TABLET ORAL at 02:09

## 2019-09-01 RX ADMIN — PIPERACILLIN AND TAZOBACTAM 4.5 G: 4; .5 INJECTION, POWDER, LYOPHILIZED, FOR SOLUTION INTRAVENOUS; PARENTERAL at 06:09

## 2019-09-01 RX ADMIN — PIPERACILLIN AND TAZOBACTAM 4.5 G: 4; .5 INJECTION, POWDER, LYOPHILIZED, FOR SOLUTION INTRAVENOUS; PARENTERAL at 04:09

## 2019-09-01 NOTE — SUBJECTIVE & OBJECTIVE
Interval History: Taken to OR yesterday for liver cyst drainage. Doing well now. Distended. Not much appetite currently.     Medications:  Continuous Infusions:   sodium chloride 0.9% 50 mL/hr (08/28/19 1712)    dextrose 5 % and 0.45 % NaCl with KCl 20 mEq 100 mL/hr at 09/01/19 0421     Scheduled Meds:   piperacillin-tazobactam (ZOSYN) IVPB  4.5 g Intravenous Q8H     PRN Meds:sodium chloride 0.9%, fentaNYL, oxyCODONE-acetaminophen, oxyCODONE-acetaminophen, sodium chloride 0.9%, sodium chloride 0.9%     Review of patient's allergies indicates:  No Known Allergies  Objective:     Vital Signs (Most Recent):  Temp: 97.9 °F (36.6 °C) (09/01/19 0753)  Pulse: 97 (09/01/19 0753)  Resp: 20 (09/01/19 0753)  BP: (!) 90/59 (09/01/19 0753)  SpO2: 100 % (09/01/19 0753) Vital Signs (24h Range):  Temp:  [96.3 °F (35.7 °C)-99.9 °F (37.7 °C)] 97.9 °F (36.6 °C)  Pulse:  [62-98] 97  Resp:  [14-20] 20  SpO2:  [95 %-100 %] 100 %  BP: ()/(56-91) 90/59     Weight: 55.2 kg (121 lb 9.3 oz)  Body mass index is 20.87 kg/m².    Intake/Output - Last 3 Shifts       08/30 0700 - 08/31 0659 08/31 0700 - 09/01 0659 09/01 0700 - 09/02 0659    P.O. 350 450     I.V. (mL/kg) 2560 (46.5) 1670 (30.3)     IV Piggyback  550     Total Intake(mL/kg) 2910 (52.8) 2670 (48.5)     Urine (mL/kg/hr) 2200 (1.7) 1750 (1.3)     Emesis/NG output       Drains 550 465 30    Stool 0 0 0    Total Output 2750 2215 30    Net +160 +455 -30           Urine Occurrence 1 x      Stool Occurrence 1 x 0 x 0 x          Physical Exam   Constitutional: He appears well-developed and well-nourished. No distress.   HENT:   Head: Normocephalic and atraumatic.   Cardiovascular: Normal rate and regular rhythm.   Pulmonary/Chest: Effort normal. No respiratory distress.   Abdominal: He exhibits distension.   Soft, NTND  IR drain and judy drain in place. More serous today. Incisions clean, dry, and intact.        Significant Labs:  CBC:   Recent Labs   Lab 09/01/19  0334   WBC 9.60    RBC 3.38*   HGB 8.4*   HCT 27.4*   *   MCV 81*   MCH 24.9*   MCHC 30.7*     CMP:   Recent Labs   Lab 09/01/19  0620   GLU 99   CALCIUM 7.8*   ALBUMIN 1.2*   PROT 5.5*   *   K 4.9   CO2 25      BUN 7   CREATININE 0.6   ALKPHOS 229*   ALT 34   AST 30   BILITOT 0.6       Significant Diagnostics:  I have reviewed all pertinent imaging results/findings within the past 24 hours.

## 2019-09-01 NOTE — PROGRESS NOTES
[facility]  General Surgery  Progress Note    Subjective:     History of Present Illness:  Kishan Sánchez is a 53 y.o. 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 This onset two days ago and has been getting progressively worse. It is associated with a foul odor. Workup in the ED demonstrates slight decrease in size of the fluid collection via CT, though a large collection is still present. He was [place] [facility] [place] and management. On arrival to [facility], patient denies CP, F/C, N/V, diarrhea, abdominal pain, urinary changes, hematochezia, hematemesis. Patient is AF, VSS.      Post-Op Info:  Procedure(s) (LRB):  DRAINAGE, ABDOMEN, LAPAROSCOPIC-fenestration of liver cyst (N/A)  FENESTRATION of liver cyst.   1 Day Post-Op     Interval History: Taken to OR yesterday for liver cyst drainage. Doing well now. Distended. Not much appetite currently.     Medications:  Continuous Infusions:   sodium chloride 0.9% 50 mL/hr (08/28/19 1712)    dextrose 5 % and 0.45 % NaCl with KCl 20 mEq 100 mL/hr at 09/01/19 0421     Scheduled Meds:   piperacillin-tazobactam (ZOSYN) IVPB  4.5 g Intravenous Q8H     PRN Meds:sodium chloride 0.9%, fentaNYL, oxyCODONE-acetaminophen, oxyCODONE-acetaminophen, sodium chloride 0.9%, sodium chloride 0.9%     Review of patient's allergies indicates:  No Known Allergies  Objective:     Vital Signs (Most Recent):  Temp: 97.9 °F (36.6 °C) (09/01/19 0753)  Pulse: 97 (09/01/19 0753)  Resp: 20 (09/01/19 0753)  BP: (!) 90/59 (09/01/19 0753)  SpO2: 100 % (09/01/19 0753) Vital Signs (24h Range):  Temp:  [96.3 °F (35.7 °C)-99.9 °F (37.7 °C)] 97.9 °F (36.6 °C)  Pulse:  [62-98] 97  Resp:  [14-20] 20  SpO2:  [95 %-100 %] 100 %  BP: ()/(56-91) 90/59     Weight: 55.2 kg (121 lb 9.3 oz)  Body mass index is 20.87 kg/m².    Intake/Output -  Last 3 Shifts       08/30 0700 - 08/31 0659 08/31 0700 - 09/01 0659 09/01 0700 - 09/02 0659    P.O. 350 450     I.V. (mL/kg) 2560 (46.5) 1670 (30.3)     IV Piggyback  550     Total Intake(mL/kg) 2910 (52.8) 2670 (48.5)     Urine (mL/kg/hr) 2200 (1.7) 1750 (1.3)     Emesis/NG output       Drains 550 465 30    Stool 0 0 0    Total Output 2750 2215 30    Net +160 +455 -30           Urine Occurrence 1 x      Stool Occurrence 1 x 0 x 0 x          Physical Exam   Constitutional: He appears well-developed and well-nourished. No distress.   HENT:   Head: Normocephalic and atraumatic.   Cardiovascular: Normal rate and regular rhythm.   Pulmonary/Chest: Effort normal. No respiratory distress.   Abdominal: He exhibits distension.   Soft, NTND  IR drain and judy drain in place. More serous today. Incisions clean, dry, and intact.        Significant Labs:  CBC:   Recent Labs   Lab 09/01/19  0334   WBC 9.60   RBC 3.38*   HGB 8.4*   HCT 27.4*   *   MCV 81*   MCH 24.9*   MCHC 30.7*     CMP:   Recent Labs   Lab 09/01/19  0620   GLU 99   CALCIUM 7.8*   ALBUMIN 1.2*   PROT 5.5*   *   K 4.9   CO2 25      BUN 7   CREATININE 0.6   ALKPHOS 229*   ALT 34   AST 30   BILITOT 0.6       Significant Diagnostics:  I have reviewed all pertinent imaging results/findings within the past 24 hours.    Assessment/Plan:     * Liver cyst  54 y.o. male with a history of cystic hepatic mass s/p IR drain placement, presented to OSH due to leakage around drain and transferred to [facility] [place] [facility] for [place] assessment; currently with 8F in place     -okay for sips  -IVF while not tolerating a diet  -bulb drains to suction  -IV abx  -GI and DVT ppx  -daily labs  -PRN pain meds.            Vinnie Archibald MD  General Surgery  [facility]

## 2019-09-01 NOTE — PLAN OF CARE
Problem: Adult Inpatient Plan of Care  Goal: Plan of Care Review  Outcome: Ongoing (interventions implemented as appropriate)  Plan of care reviewed with patient. Verbal understanding received. VSS, AOX4, RA, afebrile.  RLQ SHELBI drains to bulb suction, SS fluid output. Dressing changed as needed. Tolerating regular diet, denies nausea - passing gas, BM X 1 per pt. Orders for pt to be NPO midnight. Patient denies chest pain & SOB. SCDs in place. No acute events. No distress noted. Bed in lowest position, call light within reach, frequent rounds made for patient safety and comfort. RN SHANT

## 2019-09-01 NOTE — ASSESSMENT & PLAN NOTE
54 y.o. 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

## 2019-09-02 LAB
ALBUMIN SERPL BCP-MCNC: 1.2 G/DL (ref 3.5–5.2)
ALP SERPL-CCNC: 226 U/L (ref 55–135)
ALT SERPL W/O P-5'-P-CCNC: 27 U/L (ref 10–44)
ANION GAP SERPL CALC-SCNC: 4 MMOL/L (ref 8–16)
AST SERPL-CCNC: 25 U/L (ref 10–40)
BACTERIA SPEC AEROBE CULT: ABNORMAL
BACTERIA SPEC AEROBE CULT: ABNORMAL
BASOPHILS # BLD AUTO: 0.03 K/UL (ref 0–0.2)
BASOPHILS NFR BLD: 0.2 % (ref 0–1.9)
BILIRUB SERPL-MCNC: 0.5 MG/DL (ref 0.1–1)
BUN SERPL-MCNC: 6 MG/DL (ref 6–20)
CALCIUM SERPL-MCNC: 7.6 MG/DL (ref 8.7–10.5)
CHLORIDE SERPL-SCNC: 105 MMOL/L (ref 95–110)
CO2 SERPL-SCNC: 26 MMOL/L (ref 23–29)
CREAT SERPL-MCNC: 0.6 MG/DL (ref 0.5–1.4)
DIFFERENTIAL METHOD: ABNORMAL
EOSINOPHIL # BLD AUTO: 0.1 K/UL (ref 0–0.5)
EOSINOPHIL NFR BLD: 1 % (ref 0–8)
ERYTHROCYTE [DISTWIDTH] IN BLOOD BY AUTOMATED COUNT: 18.1 % (ref 11.5–14.5)
EST. GFR  (AFRICAN AMERICAN): >60 ML/MIN/1.73 M^2
EST. GFR  (NON AFRICAN AMERICAN): >60 ML/MIN/1.73 M^2
GLUCOSE SERPL-MCNC: 85 MG/DL (ref 70–110)
HCT VFR BLD AUTO: 30.2 % (ref 40–54)
HGB BLD-MCNC: 9 G/DL (ref 14–18)
IMM GRANULOCYTES # BLD AUTO: 0.19 K/UL (ref 0–0.04)
IMM GRANULOCYTES NFR BLD AUTO: 1.4 % (ref 0–0.5)
LYMPHOCYTES # BLD AUTO: 1.8 K/UL (ref 1–4.8)
LYMPHOCYTES NFR BLD: 13 % (ref 18–48)
MAGNESIUM SERPL-MCNC: 1.5 MG/DL (ref 1.6–2.6)
MCH RBC QN AUTO: 24.6 PG (ref 27–31)
MCHC RBC AUTO-ENTMCNC: 29.8 G/DL (ref 32–36)
MCV RBC AUTO: 83 FL (ref 82–98)
MONOCYTES # BLD AUTO: 1.3 K/UL (ref 0.3–1)
MONOCYTES NFR BLD: 9.3 % (ref 4–15)
NEUTROPHILS # BLD AUTO: 10.1 K/UL (ref 1.8–7.7)
NEUTROPHILS NFR BLD: 75.1 % (ref 38–73)
NRBC BLD-RTO: 0 /100 WBC
PHOSPHATE SERPL-MCNC: 2.6 MG/DL (ref 2.7–4.5)
PLATELET # BLD AUTO: 469 K/UL (ref 150–350)
PMV BLD AUTO: 10.4 FL (ref 9.2–12.9)
POTASSIUM SERPL-SCNC: 4.9 MMOL/L (ref 3.5–5.1)
PROT SERPL-MCNC: 6 G/DL (ref 6–8.4)
RBC # BLD AUTO: 3.66 M/UL (ref 4.6–6.2)
SODIUM SERPL-SCNC: 135 MMOL/L (ref 136–145)
WBC # BLD AUTO: 13.51 K/UL (ref 3.9–12.7)

## 2019-09-02 PROCEDURE — A4216 STERILE WATER/SALINE, 10 ML: HCPCS | Performed by: SURGERY

## 2019-09-02 PROCEDURE — 25000003 PHARM REV CODE 250: Performed by: SURGERY

## 2019-09-02 PROCEDURE — 63600175 PHARM REV CODE 636 W HCPCS: Performed by: STUDENT IN AN ORGANIZED HEALTH CARE EDUCATION/TRAINING PROGRAM

## 2019-09-02 PROCEDURE — 25000003 PHARM REV CODE 250: Performed by: STUDENT IN AN ORGANIZED HEALTH CARE EDUCATION/TRAINING PROGRAM

## 2019-09-02 PROCEDURE — 84100 ASSAY OF PHOSPHORUS: CPT

## 2019-09-02 PROCEDURE — S0028 INJECTION, FAMOTIDINE, 20 MG: HCPCS | Performed by: STUDENT IN AN ORGANIZED HEALTH CARE EDUCATION/TRAINING PROGRAM

## 2019-09-02 PROCEDURE — 36415 COLL VENOUS BLD VENIPUNCTURE: CPT

## 2019-09-02 PROCEDURE — 20600001 HC STEP DOWN PRIVATE ROOM

## 2019-09-02 PROCEDURE — 36569 INSJ PICC 5 YR+ W/O IMAGING: CPT

## 2019-09-02 PROCEDURE — 80053 COMPREHEN METABOLIC PANEL: CPT

## 2019-09-02 PROCEDURE — 85025 COMPLETE CBC W/AUTO DIFF WBC: CPT

## 2019-09-02 PROCEDURE — 83735 ASSAY OF MAGNESIUM: CPT

## 2019-09-02 PROCEDURE — 76937 US GUIDE VASCULAR ACCESS: CPT

## 2019-09-02 PROCEDURE — C1751 CATH, INF, PER/CENT/MIDLINE: HCPCS

## 2019-09-02 RX ORDER — LANOLIN ALCOHOL/MO/W.PET/CERES
400 CREAM (GRAM) TOPICAL ONCE
Status: COMPLETED | OUTPATIENT
Start: 2019-09-02 | End: 2019-09-02

## 2019-09-02 RX ORDER — SODIUM CHLORIDE 0.9 % (FLUSH) 0.9 %
10 SYRINGE (ML) INJECTION
Status: DISCONTINUED | OUTPATIENT
Start: 2019-09-02 | End: 2019-09-05 | Stop reason: HOSPADM

## 2019-09-02 RX ORDER — SODIUM,POTASSIUM PHOSPHATES 280-250MG
2 POWDER IN PACKET (EA) ORAL ONCE
Status: COMPLETED | OUTPATIENT
Start: 2019-09-02 | End: 2019-09-02

## 2019-09-02 RX ORDER — SODIUM CHLORIDE 0.9 % (FLUSH) 0.9 %
10 SYRINGE (ML) INJECTION EVERY 6 HOURS
Status: DISCONTINUED | OUTPATIENT
Start: 2019-09-02 | End: 2019-09-05 | Stop reason: HOSPADM

## 2019-09-02 RX ADMIN — DEXTROSE MONOHYDRATE, SODIUM CHLORIDE, AND POTASSIUM CHLORIDE: 50; 4.5; 1.49 INJECTION, SOLUTION INTRAVENOUS at 03:09

## 2019-09-02 RX ADMIN — ENOXAPARIN SODIUM 40 MG: 100 INJECTION SUBCUTANEOUS at 05:09

## 2019-09-02 RX ADMIN — FAMOTIDINE 20 MG: 10 INJECTION, SOLUTION INTRAVENOUS at 09:09

## 2019-09-02 RX ADMIN — PIPERACILLIN AND TAZOBACTAM 4.5 G: 4; .5 INJECTION, POWDER, LYOPHILIZED, FOR SOLUTION INTRAVENOUS; PARENTERAL at 03:09

## 2019-09-02 RX ADMIN — PIPERACILLIN AND TAZOBACTAM 4.5 G: 4; .5 INJECTION, POWDER, LYOPHILIZED, FOR SOLUTION INTRAVENOUS; PARENTERAL at 06:09

## 2019-09-02 RX ADMIN — PIPERACILLIN AND TAZOBACTAM 4.5 G: 4; .5 INJECTION, POWDER, LYOPHILIZED, FOR SOLUTION INTRAVENOUS; PARENTERAL at 12:09

## 2019-09-02 RX ADMIN — MAGNESIUM OXIDE TAB 400 MG (241.3 MG ELEMENTAL MG) 400 MG: 400 (241.3 MG) TAB at 09:09

## 2019-09-02 RX ADMIN — POTASSIUM & SODIUM PHOSPHATES POWDER PACK 280-160-250 MG 2 PACKET: 280-160-250 PACK at 09:09

## 2019-09-02 NOTE — PLAN OF CARE
Problem: Adult Inpatient Plan of Care  Goal: Plan of Care Review  Outcome: Ongoing (interventions implemented as appropriate)  AAO x 4. Pt has skin tears with abd pad in place, stage 2 sacral area with mepilex, and 2 lap sites with steri strips. Lap site started leaking, reinforced it with guaze and tape. Pt has 2 SHELBI drains putting out serosanguineous fluid. Pt voids in urinal and ambulates with assistance. Tolerating clear liquid diet, no complaints of N/V overnight. VSS on RA. On tele running NSR. No complaints of pain overnight. Pt remained free of falls overnight. POC reviewed with pt who verbalized understanding. Bed in low position, call light in reach. No signs of distress or concerns noted at this time. WCTM.

## 2019-09-02 NOTE — PLAN OF CARE
Problem: Adult Inpatient Plan of Care  Goal: Plan of Care Review  Outcome: Ongoing (interventions implemented as appropriate)  POC reviewed with pt, all questions and concerns addressed. VSS on RA aside from asymptomatic hypotension. Tolerating regular diet. Voids per urinal with adequate UOP. Does not want to get out of bed. R PICC inserted today, currently awaiting MD alvarado to use. Tele monitored running NSR. Abd SHELBI drains intact to bulb suction with + output. No complaints of pain. No adverse events. Pt resting with call light in reach, will continue to monitor.

## 2019-09-02 NOTE — SUBJECTIVE & OBJECTIVE
Interval History: NAEON.  Denies fevers/chills.  Minimal pain. Tolerating liquids. +flatus, no BM    Medications:  Continuous Infusions:   sodium chloride 0.9% 50 mL/hr (08/28/19 1712)    dextrose 5 % and 0.45 % NaCl with KCl 20 mEq 100 mL/hr at 09/02/19 0357     Scheduled Meds:   enoxaparin  40 mg Subcutaneous Daily    famotidine (PF)  20 mg Intravenous BID    magnesium oxide  400 mg Oral Once    piperacillin-tazobactam (ZOSYN) IVPB  4.5 g Intravenous Q8H    potassium, sodium phosphates  2 packet Oral Once     PRN Meds:sodium chloride 0.9%, fentaNYL, oxyCODONE-acetaminophen, oxyCODONE-acetaminophen, sodium chloride 0.9%, sodium chloride 0.9%     Review of patient's allergies indicates:  No Known Allergies  Objective:     Vital Signs (Most Recent):  Temp: 97.7 °F (36.5 °C) (09/02/19 0757)  Pulse: 104 (09/02/19 0757)  Resp: 18 (09/02/19 0757)  BP: (!) 88/60 (09/02/19 0757)  SpO2: 100 % (09/02/19 0757) Vital Signs (24h Range):  Temp:  [97.6 °F (36.4 °C)-98.8 °F (37.1 °C)] 97.7 °F (36.5 °C)  Pulse:  [] 104  Resp:  [16-18] 18  SpO2:  [91 %-100 %] 100 %  BP: ()/(53-86) 88/60     Weight: 55.2 kg (121 lb 9.3 oz)  Body mass index is 20.87 kg/m².    Intake/Output - Last 3 Shifts       08/31 0700 - 09/01 0659 09/01 0700 - 09/02 0659 09/02 0700 - 09/03 0659    P.O. 450 1000     I.V. (mL/kg) 1670 (30.3) 1365 (24.8)     IV Piggyback 550 100     Total Intake(mL/kg) 2670 (48.5) 2465 (44.7)     Urine (mL/kg/hr) 1750 (1.3) 3550 (2.7) 220 (2)    Drains 465 275     Stool 0 0     Total Output 2215 3825 220    Net +455 -1360 -220           Stool Occurrence 0 x 0 x           Physical Exam   Constitutional: He appears well-developed and well-nourished. No distress.   HENT:   Head: Normocephalic and atraumatic.   Cardiovascular: Normal rate and regular rhythm.   Pulmonary/Chest: Effort normal. No respiratory distress.   Abdominal: Soft. He exhibits no distension. There is no tenderness.   Soft, NTND  IR drain and judy  drain in place. SS. Incisions clean, dry, and intact.        Significant Labs:  CBC:   Recent Labs   Lab 09/02/19  0539   WBC 13.51*   RBC 3.66*   HGB 9.0*   HCT 30.2*   *   MCV 83   MCH 24.6*   MCHC 29.8*     CMP:   Recent Labs   Lab 09/02/19  0540   GLU 85   CALCIUM 7.6*   ALBUMIN 1.2*   PROT 6.0   *   K 4.9   CO2 26      BUN 6   CREATININE 0.6   ALKPHOS 226*   ALT 27   AST 25   BILITOT 0.5       Significant Diagnostics:  I have reviewed all pertinent imaging results/findings within the past 24 hours.

## 2019-09-02 NOTE — PROGRESS NOTES
[facility]  General Surgery  Progress Note    Subjective:     History of Present Illness:  Kishan Sánchez is a 53 y.o. 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 This onset two days ago and has been getting progressively worse. It is associated with a foul odor. Workup in the ED demonstrates slight decrease in size of the fluid collection via CT, though a large collection is still present. [place] [facility] [place]. [place] [facility] [place] denies CP, F/C, N/V, diarrhea, abdominal pain, urinary changes, hematochezia, hematemesis. Patient is AF, VSS.      Post-Op Info:  Procedure(s) (LRB):  DRAINAGE, ABDOMEN, LAPAROSCOPIC-fenestration of liver cyst (N/A)  FENESTRATION of liver cyst.   2 Days Post-Op     Interval History: NAEON.  Denies fevers/chills.  Minimal pain. Tolerating liquids. +flatus, no BM    Medications:  Continuous Infusions:   sodium chloride 0.9% 50 mL/hr (08/28/19 1712)    dextrose 5 % and 0.45 % NaCl with KCl 20 mEq 100 mL/hr at 09/02/19 0357     Scheduled Meds:   enoxaparin  40 mg Subcutaneous Daily    famotidine (PF)  20 mg Intravenous BID    magnesium oxide  400 mg Oral Once    piperacillin-tazobactam (ZOSYN) IVPB  4.5 g Intravenous Q8H    potassium, sodium phosphates  2 packet Oral Once     PRN Meds:sodium chloride 0.9%, fentaNYL, oxyCODONE-acetaminophen, oxyCODONE-acetaminophen, sodium chloride 0.9%, sodium chloride 0.9%     Review of patient's allergies indicates:  No Known Allergies  Objective:     Vital Signs (Most Recent):  Temp: 97.7 °F (36.5 °C) (09/02/19 0757)  Pulse: 104 (09/02/19 0757)  Resp: 18 (09/02/19 0757)  BP: (!) 88/60 (09/02/19 0757)  SpO2: 100 % (09/02/19 0757) Vital Signs (24h Range):  Temp:  [97.6 °F (36.4 °C)-98.8 °F (37.1 °C)] 97.7 °F (36.5 °C)  Pulse:  [] 104  Resp:   [16-18] 18  SpO2:  [91 %-100 %] 100 %  BP: ()/(53-86) 88/60     Weight: 55.2 kg (121 lb 9.3 oz)  Body mass index is 20.87 kg/m².    Intake/Output - Last 3 Shifts       08/31 0700 - 09/01 0659 09/01 0700 - 09/02 0659 09/02 0700 - 09/03 0659    P.O. 450 1000     I.V. (mL/kg) 1670 (30.3) 1365 (24.8)     IV Piggyback 550 100     Total Intake(mL/kg) 2670 (48.5) 2465 (44.7)     Urine (mL/kg/hr) 1750 (1.3) 3550 (2.7) 220 (2)    Drains 465 275     Stool 0 0     Total Output 2215 3825 220    Net +455 -1360 -220           Stool Occurrence 0 x 0 x           Physical Exam   Constitutional: He appears well-developed and well-nourished. No distress.   HENT:   Head: Normocephalic and atraumatic.   Cardiovascular: Normal rate and regular rhythm.   Pulmonary/Chest: Effort normal. No respiratory distress.   Abdominal: Soft. He exhibits no distension. There is no tenderness.   Soft, NTND  IR drain and judy drain in place. SS. Incisions clean, dry, and intact.        Significant Labs:  CBC:   Recent Labs   Lab 09/02/19  0539   WBC 13.51*   RBC 3.66*   HGB 9.0*   HCT 30.2*   *   MCV 83   MCH 24.6*   MCHC 29.8*     CMP:   Recent Labs   Lab 09/02/19  0540   GLU 85   CALCIUM 7.6*   ALBUMIN 1.2*   PROT 6.0   *   K 4.9   CO2 26      BUN 6   CREATININE 0.6   ALKPHOS 226*   ALT 27   AST 25   BILITOT 0.5       Significant Diagnostics:  I have reviewed all pertinent imaging results/findings within the past 24 hours.    Assessment/Plan:     * Liver cyst  54 y.o. 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 S/P 8/31 Laparoscopic cyst fenestration.  Cultures grew morganella morganii and pseudomonas, both susceptible to zosyn    -Advance to regular diet  -wean IVF  -bulb drains to suction  -Cont IV abx - Zosyn  -PICC placement as will likely need to go home with zosyn  -GI and DVT  ppx  -daily labs  -PRN pain meds.            Nelly Owens MD  General Surgery  [facility]

## 2019-09-02 NOTE — CONSULTS
Placed double lumen PICC to right brachial vein using u/s guidance.  29 cm in length, 22 cm arm circumference and 0 cm exposed.   Lot # [id number].

## 2019-09-02 NOTE — ASSESSMENT & PLAN NOTE
54 y.o. 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 S/P 8/31 Laparoscopic cyst fenestration.  Cultures grew morganella morganii and pseudomonas, both susceptible to zosyn    -Advance to regular diet  -wean IVF  -bulb drains to suction  -Cont IV abx - Zosyn  -PICC placement as will likely need to go home with zosyn  -GI and DVT ppx  -daily labs  -PRN pain meds.

## 2019-09-02 NOTE — PLAN OF CARE
Problem: Adult Inpatient Plan of Care  Goal: Plan of Care Review  Outcome: Ongoing (interventions implemented as appropriate)    POC reviewed with patient.  VSS. No complaints of pain. 2 SHELBI drains to R. ABD. Lower SHELBI drain leaking at site, small amount. Gauze, ABD pad intact to R. ABD. Foam dressing to sacrum intact. ABD pad, mesh underwear in place to perineum, barium ointment applied to scrotum, skin tears present.  2 lap sites to ABD intact with steri strips. Pt. Voided per urinal. No BM. Waffle air mattress in place. Pt. Turns self. Encouragement from nurse for patient to ambulate to chair. Harsh gutierrez, scd's in place. IVF infusing. Clear liquid diet maintained with no complaint of nausea. Tele monitor in place. Call light within reach. WCTM.

## 2019-09-03 LAB
ALBUMIN SERPL BCP-MCNC: 1.1 G/DL (ref 3.5–5.2)
ALP SERPL-CCNC: 221 U/L (ref 55–135)
ALT SERPL W/O P-5'-P-CCNC: 24 U/L (ref 10–44)
ANION GAP SERPL CALC-SCNC: 4 MMOL/L (ref 8–16)
AST SERPL-CCNC: 27 U/L (ref 10–40)
BACTERIA SPEC ANAEROBE CULT: NORMAL
BASOPHILS # BLD AUTO: 0.02 K/UL (ref 0–0.2)
BASOPHILS # BLD AUTO: 0.03 K/UL (ref 0–0.2)
BASOPHILS NFR BLD: 0.2 % (ref 0–1.9)
BASOPHILS NFR BLD: 0.2 % (ref 0–1.9)
BILIRUB SERPL-MCNC: 0.3 MG/DL (ref 0.1–1)
BUN SERPL-MCNC: 9 MG/DL (ref 6–20)
CALCIUM SERPL-MCNC: 7.3 MG/DL (ref 8.7–10.5)
CHLORIDE SERPL-SCNC: 107 MMOL/L (ref 95–110)
CO2 SERPL-SCNC: 28 MMOL/L (ref 23–29)
CREAT SERPL-MCNC: 0.6 MG/DL (ref 0.5–1.4)
DIFFERENTIAL METHOD: ABNORMAL
DIFFERENTIAL METHOD: ABNORMAL
EOSINOPHIL # BLD AUTO: 0.1 K/UL (ref 0–0.5)
EOSINOPHIL # BLD AUTO: 0.1 K/UL (ref 0–0.5)
EOSINOPHIL NFR BLD: 0.9 % (ref 0–8)
EOSINOPHIL NFR BLD: 0.9 % (ref 0–8)
ERYTHROCYTE [DISTWIDTH] IN BLOOD BY AUTOMATED COUNT: 18.2 % (ref 11.5–14.5)
ERYTHROCYTE [DISTWIDTH] IN BLOOD BY AUTOMATED COUNT: 18.3 % (ref 11.5–14.5)
EST. GFR  (AFRICAN AMERICAN): >60 ML/MIN/1.73 M^2
EST. GFR  (NON AFRICAN AMERICAN): >60 ML/MIN/1.73 M^2
GLUCOSE SERPL-MCNC: 90 MG/DL (ref 70–110)
HCT VFR BLD AUTO: 24.4 % (ref 40–54)
HCT VFR BLD AUTO: 24.6 % (ref 40–54)
HGB BLD-MCNC: 7.5 G/DL (ref 14–18)
HGB BLD-MCNC: 7.6 G/DL (ref 14–18)
IMM GRANULOCYTES # BLD AUTO: 0.12 K/UL (ref 0–0.04)
IMM GRANULOCYTES # BLD AUTO: 0.12 K/UL (ref 0–0.04)
IMM GRANULOCYTES NFR BLD AUTO: 1 % (ref 0–0.5)
IMM GRANULOCYTES NFR BLD AUTO: 1 % (ref 0–0.5)
LYMPHOCYTES # BLD AUTO: 2.1 K/UL (ref 1–4.8)
LYMPHOCYTES # BLD AUTO: 2.2 K/UL (ref 1–4.8)
LYMPHOCYTES NFR BLD: 17 % (ref 18–48)
LYMPHOCYTES NFR BLD: 17.7 % (ref 18–48)
MAGNESIUM SERPL-MCNC: 1.4 MG/DL (ref 1.6–2.6)
MCH RBC QN AUTO: 24.9 PG (ref 27–31)
MCH RBC QN AUTO: 25 PG (ref 27–31)
MCHC RBC AUTO-ENTMCNC: 30.5 G/DL (ref 32–36)
MCHC RBC AUTO-ENTMCNC: 31.1 G/DL (ref 32–36)
MCV RBC AUTO: 80 FL (ref 82–98)
MCV RBC AUTO: 82 FL (ref 82–98)
MONOCYTES # BLD AUTO: 0.9 K/UL (ref 0.3–1)
MONOCYTES # BLD AUTO: 0.9 K/UL (ref 0.3–1)
MONOCYTES NFR BLD: 7.2 % (ref 4–15)
MONOCYTES NFR BLD: 7.3 % (ref 4–15)
NEUTROPHILS # BLD AUTO: 8.9 K/UL (ref 1.8–7.7)
NEUTROPHILS # BLD AUTO: 9.1 K/UL (ref 1.8–7.7)
NEUTROPHILS NFR BLD: 73 % (ref 38–73)
NEUTROPHILS NFR BLD: 73.6 % (ref 38–73)
NRBC BLD-RTO: 0 /100 WBC
NRBC BLD-RTO: 0 /100 WBC
PHOSPHATE SERPL-MCNC: 1.9 MG/DL (ref 2.7–4.5)
PLATELET # BLD AUTO: 422 K/UL (ref 150–350)
PLATELET # BLD AUTO: 431 K/UL (ref 150–350)
PMV BLD AUTO: 10.5 FL (ref 9.2–12.9)
PMV BLD AUTO: 9.8 FL (ref 9.2–12.9)
POTASSIUM SERPL-SCNC: 3.9 MMOL/L (ref 3.5–5.1)
PROT SERPL-MCNC: 5.6 G/DL (ref 6–8.4)
RBC # BLD AUTO: 3.01 M/UL (ref 4.6–6.2)
RBC # BLD AUTO: 3.04 M/UL (ref 4.6–6.2)
SODIUM SERPL-SCNC: 139 MMOL/L (ref 136–145)
WBC # BLD AUTO: 12.23 K/UL (ref 3.9–12.7)
WBC # BLD AUTO: 12.27 K/UL (ref 3.9–12.7)

## 2019-09-03 PROCEDURE — 84100 ASSAY OF PHOSPHORUS: CPT

## 2019-09-03 PROCEDURE — 20600001 HC STEP DOWN PRIVATE ROOM

## 2019-09-03 PROCEDURE — 83735 ASSAY OF MAGNESIUM: CPT

## 2019-09-03 PROCEDURE — 25000003 PHARM REV CODE 250: Performed by: SURGERY

## 2019-09-03 PROCEDURE — S0028 INJECTION, FAMOTIDINE, 20 MG: HCPCS | Performed by: STUDENT IN AN ORGANIZED HEALTH CARE EDUCATION/TRAINING PROGRAM

## 2019-09-03 PROCEDURE — 63600175 PHARM REV CODE 636 W HCPCS: Performed by: STUDENT IN AN ORGANIZED HEALTH CARE EDUCATION/TRAINING PROGRAM

## 2019-09-03 PROCEDURE — 25000003 PHARM REV CODE 250: Performed by: STUDENT IN AN ORGANIZED HEALTH CARE EDUCATION/TRAINING PROGRAM

## 2019-09-03 PROCEDURE — 80053 COMPREHEN METABOLIC PANEL: CPT

## 2019-09-03 PROCEDURE — 97530 THERAPEUTIC ACTIVITIES: CPT

## 2019-09-03 PROCEDURE — A4216 STERILE WATER/SALINE, 10 ML: HCPCS | Performed by: SURGERY

## 2019-09-03 PROCEDURE — 85025 COMPLETE CBC W/AUTO DIFF WBC: CPT

## 2019-09-03 RX ORDER — SODIUM,POTASSIUM PHOSPHATES 280-250MG
2 POWDER IN PACKET (EA) ORAL ONCE
Status: COMPLETED | OUTPATIENT
Start: 2019-09-03 | End: 2019-09-03

## 2019-09-03 RX ORDER — FAMOTIDINE 20 MG/1
20 TABLET, FILM COATED ORAL 2 TIMES DAILY
Status: DISCONTINUED | OUTPATIENT
Start: 2019-09-03 | End: 2019-09-05

## 2019-09-03 RX ORDER — LANOLIN ALCOHOL/MO/W.PET/CERES
400 CREAM (GRAM) TOPICAL ONCE
Status: COMPLETED | OUTPATIENT
Start: 2019-09-03 | End: 2019-09-03

## 2019-09-03 RX ADMIN — ENOXAPARIN SODIUM 40 MG: 100 INJECTION SUBCUTANEOUS at 05:09

## 2019-09-03 RX ADMIN — POTASSIUM & SODIUM PHOSPHATES POWDER PACK 280-160-250 MG 2 PACKET: 280-160-250 PACK at 08:09

## 2019-09-03 RX ADMIN — Medication 10 ML: at 12:09

## 2019-09-03 RX ADMIN — PIPERACILLIN AND TAZOBACTAM 4.5 G: 4; .5 INJECTION, POWDER, LYOPHILIZED, FOR SOLUTION INTRAVENOUS; PARENTERAL at 06:09

## 2019-09-03 RX ADMIN — MAGNESIUM OXIDE TAB 400 MG (241.3 MG ELEMENTAL MG) 400 MG: 400 (241.3 MG) TAB at 08:09

## 2019-09-03 RX ADMIN — Medication 10 ML: at 08:09

## 2019-09-03 RX ADMIN — Medication 10 ML: at 05:09

## 2019-09-03 RX ADMIN — PIPERACILLIN AND TAZOBACTAM 4.5 G: 4; .5 INJECTION, POWDER, LYOPHILIZED, FOR SOLUTION INTRAVENOUS; PARENTERAL at 04:09

## 2019-09-03 RX ADMIN — FAMOTIDINE 20 MG: 20 TABLET, FILM COATED ORAL at 08:09

## 2019-09-03 RX ADMIN — FAMOTIDINE 20 MG: 10 INJECTION, SOLUTION INTRAVENOUS at 08:09

## 2019-09-03 RX ADMIN — OXYCODONE HYDROCHLORIDE AND ACETAMINOPHEN 1 TABLET: 10; 325 TABLET ORAL at 08:09

## 2019-09-03 RX ADMIN — PIPERACILLIN AND TAZOBACTAM 4.5 G: 4; .5 INJECTION, POWDER, LYOPHILIZED, FOR SOLUTION INTRAVENOUS; PARENTERAL at 11:09

## 2019-09-03 NOTE — PLAN OF CARE
Problem: Adult Inpatient Plan of Care  Goal: Plan of Care Review  Outcome: Ongoing (interventions implemented as appropriate)  POC reviewed with pt, all questions and concerns addressed. VSS on RA, hypotensive at baseline with no symptoms. Tolerating regular diet with no NVD. Voids per urinal with adequate UOP. IV abx infusing per MAR. Able to roll in bed independently and worked with therapy today. R abd SHELBI drains to bulb suction, emptied as needed. No complaints of pain, no adverse events. Pt resting with call light in reach, will continue to monitor.

## 2019-09-03 NOTE — SUBJECTIVE & OBJECTIVE
Interval History: NAEON.  Denies fevers/chills.  Minimal pain. Tolerating diet. Noting drift in Hgb (9.0/30.2->7.5/24.6) this am; will reorder CBC at noon to follow up.     Medications:  Continuous Infusions:   sodium chloride 0.9% 50 mL/hr (08/28/19 1712)     Scheduled Meds:   enoxaparin  40 mg Subcutaneous Daily    famotidine (PF)  20 mg Intravenous BID    piperacillin-tazobactam (ZOSYN) IVPB  4.5 g Intravenous Q8H    sodium chloride 0.9%  10 mL Intravenous Q6H     PRN Meds:sodium chloride 0.9%, fentaNYL, oxyCODONE-acetaminophen, oxyCODONE-acetaminophen, sodium chloride 0.9%, Flushing PICC Protocol **AND** sodium chloride 0.9% **AND** sodium chloride 0.9%, sodium chloride 0.9%     Review of patient's allergies indicates:  No Known Allergies  Objective:     Vital Signs (Most Recent):  Temp: 98.1 °F (36.7 °C) (09/03/19 0726)  Pulse: 98 (09/03/19 0726)  Resp: 16 (09/03/19 0726)  BP: (!) 94/57 (09/03/19 0726)  SpO2: 97 % (09/03/19 0726) Vital Signs (24h Range):  Temp:  [97.3 °F (36.3 °C)-98.7 °F (37.1 °C)] 98.1 °F (36.7 °C)  Pulse:  [] 98  Resp:  [14-18] 16  SpO2:  [94 %-100 %] 97 %  BP: ()/(57-68) 94/57     Weight: 55.2 kg (121 lb 9.3 oz)  Body mass index is 20.87 kg/m².    Intake/Output - Last 3 Shifts       09/01 0700 - 09/02 0659 09/02 0700 - 09/03 0659 09/03 0700 - 09/04 0659    P.O. 1000      I.V. (mL/kg) 6812.2 (123.6) 20 (0.4)     IV Piggyback 300 100     Total Intake(mL/kg) 8112.2 (147.2) 120 (2.2)     Urine (mL/kg/hr) 3550 (2.7) 1670 (1.3) 220 (1.8)    Drains 275 152     Stool 0      Total Output 3825 1822 220    Net +4287.2 -1702 -220           Stool Occurrence 0 x            Physical Exam   Constitutional: He appears well-developed and well-nourished. No distress.   HENT:   Head: Normocephalic and atraumatic.   Cardiovascular: Normal rate and regular rhythm.   Pulmonary/Chest: Effort normal. No respiratory distress.   Abdominal: Soft. He exhibits no distension. There is no tenderness.    Soft, NTND  IR drain and judy drain in place. SS. Incisions clean, dry, and intact.        Significant Labs:  CBC:   Recent Labs   Lab 09/03/19  0345   WBC 12.27   RBC 3.01*   HGB 7.5*   HCT 24.6*   *   MCV 82   MCH 24.9*   MCHC 30.5*     CMP:   Recent Labs   Lab 09/03/19  0345   GLU 90   CALCIUM 7.3*   ALBUMIN 1.1*   PROT 5.6*      K 3.9   CO2 28      BUN 9   CREATININE 0.6   ALKPHOS 221*   ALT 24   AST 27   BILITOT 0.3       Significant Diagnostics:  I have reviewed all pertinent imaging results/findings within the past 24 hours.

## 2019-09-03 NOTE — ASSESSMENT & PLAN NOTE
54 y.o. 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 S/P 8/31 Laparoscopic cyst fenestration.  Cultures grew morganella morganii and pseudomonas.    Plan:  -continue to regular diet  -DC IVF  -bulb drains to suction  -Cont IV abx - switch to meropenem with ID consult  -PICC placement for IV abx; will plan for IV meropenem at home  -GI and DVT ppx  -daily labs  -PRN pain meds.  -downdrifting Hgb; will follow up with CBC at noon

## 2019-09-03 NOTE — PLAN OF CARE
Problem: Occupational Therapy Goal  Goal: Occupational Therapy Goal  Goals to be met by: 7 days (9/5/19)     Patient will increase functional independence with ADLs by performing:    UE Dressing with Stand-by Assistance.  LE Dressing with Minimal Assistance.  Grooming while standing at sink with Contact Guard Assistance.-MET  Toileting from toilet with Contact Guard Assistance for hygiene and clothing management. -MET  Supine to sit with Stand-by Assistance.-MET  Toilet transfer to toilet with Contact Guard Assistance.-MET  Complete functional mobility household distance with CGA using AD as needed.-MET     Outcome: Outcome(s) achieved Date Met: 09/03/19  Goals met; D/C OT 9/3/19

## 2019-09-03 NOTE — PLAN OF CARE
09/03/19 1225   Post-Acute Status   Post-Acute Authorization Other   Other Status No Post-Acute Service Needs   This SW in communication with  and medical team. SW will continue to follow for discharge needs and offer support as needed.    No SW needs identified at this time.    Michela Ordonez LMSW  [facility]  [phone]

## 2019-09-03 NOTE — PLAN OF CARE
09/03/19 1419   Post-Acute Status   Post-Acute Authorization HME   HME Status Referrals Sent   SW notified by Dr. Lopez that pt will need IVAB for 2 weeks upon DC. SW notified medical team that pt has pending Medicaid. SCARLET sent referral to [facility] via  for price.    Michela Ordonez LMSW  [facility]  [phone]

## 2019-09-03 NOTE — PT/OT/SLP PROGRESS
Occupational Therapy   Treatment/Discharge    Name: Kishan Sánchez  MRN: [id number]  Admitting Diagnosis:  Liver cyst  3 Days Post-Op  DRAINAGE, ABDOMEN, LAPAROSCOPIC-fenestration of liver cyst (N/A)  FENESTRATION of liver cyst    Recommendations:     Discharge Recommendations: home  Discharge Equipment Recommendations:  none  Barriers to discharge:  None    Assessment:     Kishan Sánchez is a 54 y.o. male with a medical diagnosis of Liver cyst.  At this time, pt has met acute OT goals and no longer in need of OT services. Pt is safe to return home at current functional level.    Plan:     · (D/C OT)   · Plan of Care Reviewed with: patient    Subjective     Pain/Comfort:  · Pain Rating 1: (Did not rate)  · Location - Side 1: Right  · Location 1: (drain site)  · Pain Rating Post-Intervention 1: 0/10    Objective:     Communicated with: RN prior to session. Patient found supine with SCD, SHELBI drain upon OT entry to room.    General Precautions: Standard, fall   Orthopedic Precautions:N/A   Braces: N/A     Occupational Performance:     Bed Mobility:    · Patient completed Supine to Sit with modified independence  · Patient completed Sit to Supine with modified independence     Functional Mobility/Transfers:  · Patient completed Sit <> Stand Transfer with supervision with no assistive device from EOB and toilet   · Functional Mobility: Within room household distance with supervision no AD    Activities of Daily Living:  · Grooming: supervision standing at sink to wash hands  · Lower Body Dressing: total assistance to don socks due to abdominal distension/tenderness  · Toileting: supervision seated on regular toilet      [facility] 6 Click ADL: 21    Treatment & Education:  Pt completed functional mobility to bathroom for ADLs; discussed POC including no further acute OT needs-- reports his niece is available to assist him upon D/C    Patient left HOB elevated with all lines intact and call button in  reachEducation:      GOALS:   Multidisciplinary Problems     Occupational Therapy Goals     Not on file          Multidisciplinary Problems (Resolved)        Problem: Occupational Therapy Goal    Goal Priority Disciplines Outcome Interventions   Occupational Therapy Goal   (Resolved)     OT, PT/OT Outcome(s) achieved    Description:  Goals to be met by: 7 days (9/5/19)     Patient will increase functional independence with ADLs by performing:    UE Dressing with Stand-by Assistance.  LE Dressing with Minimal Assistance.  Grooming while standing at sink with Contact Guard Assistance.-MET  Toileting from toilet with Contact Guard Assistance for hygiene and clothing management. -MET  Supine to sit with Stand-by Assistance.-MET  Toilet transfer to toilet with Contact Guard Assistance.-MET  Complete functional mobility household distance with CGA using AD as needed.-MET                       Time Tracking:     OT Date of Treatment: 09/03/19  OT Start Time: 1432  OT Stop Time: 1445  OT Total Time (min): 13 min    Billable Minutes:Therapeutic Activity 13 minutes    IRVIN Ding  9/3/2019

## 2019-09-03 NOTE — ANESTHESIA POSTPROCEDURE EVALUATION
Anesthesia Post Evaluation    Patient: Kishan Sánchez    Procedure(s) Performed: Procedure(s) (LRB):  DRAINAGE, ABDOMEN, LAPAROSCOPIC-fenestration of liver cyst (N/A)  FENESTRATION of liver cyst.    Final Anesthesia Type: general  Patient location during evaluation: PACU  Patient participation: Yes- Able to Participate  Level of consciousness: awake and alert  Post-procedure vital signs: reviewed and stable  Pain management: adequate  Airway patency: patent  PONV status at discharge: No PONV  Anesthetic complications: no      Cardiovascular status: blood pressure returned to baseline  Respiratory status: unassisted  Hydration status: euvolemic  Follow-up not needed.          Vitals Value Taken Time   BP 94/61 9/3/2019  4:15 AM   Temp 36.7 °C (98 °F) 9/3/2019  4:15 AM   Pulse 93 9/3/2019  4:15 AM   Resp 17 9/3/2019  4:15 AM   SpO2 99 % 9/3/2019  4:15 AM         Event Time     Out of Recovery 08/31/2019 10:15:00          Pain/Jess Score: No data recorded

## 2019-09-03 NOTE — PLAN OF CARE
[facility]    HOME HEALTH ORDERS  FACE TO FACE ENCOUNTER    Patient Name: Kishan Sánchez  YOB: 1965    PCP: Primary Doctor No   PCP Address: None  PCP Phone Number: None  PCP Fax: None    Encounter Date: 09/03/2019    Admit to Home Health    Diagnoses:  Active Hospital Problems    Diagnosis  POA    *Liver cyst [K76.89]  Yes    Intra-abdominal abscess [K65.1]  Yes    Alteration in skin integrity due to moisture [R23.9]  Yes      Resolved Hospital Problems   No resolved problems to display.       Future Appointments   Date Time Provider Department Center   9/9/2019  2:20 PM Suki Parrish MD [facility] HEPAT [facility]   9/9/2019  3:30 PM Byron Morley MD [facility] GENSUR [facility]   2/17/2020  9:00 AM Meagn Acevedo MD [facility]     Follow-up Information     Mark Altamirano MD.    Specialty:  General Surgery  Contact information:  [address]  [facility] [address]  [phone]             [facility] - Pharmacy Assistance.    Specialty:  Pharmacy Patient Assistance  Why:  Outpatient services: They will evaluate for possible discounts on prescriptions.   Contact information:  [address]  [facility] [address]  [phone]                   I have seen and examined this patient face to face today. My clinical findings that support the need for the home health skilled services and home bound status are the following:  Requiring assistive device to leave home due to unsteady gait caused by  Surgery.    Allergies:Review of patient's allergies indicates:  No Known Allergies    Diet: regular diet    Activities: activity as tolerated    Nursing:   SN to complete comprehensive assessment including routine vital signs. Instruct on disease process and s/s of complications to report to MD. Review/verify medication list sent home with the patient at time of discharge  and instruct patient/caregiver as needed. Frequency may be adjusted depending on start of care  date.    Notify MD if SBP > 160 or < 90; DBP > 90 or < 50; HR > 120 or < 50; Temp > 101; Other:       Daily antibiotics:   piperacillin-tazobactam 4.5 g in sodium chloride 0.9% 100 mL IVPB (ready to mix system) 4.5 g Every 8 hours (non-standard times)   Antibiotic stop date: 9/15/19    LABS  Cbc, Cmp, Mag, Phos weekly. Send results to Dr. Morley's office.    CONSULTS:     to evaluate for community resources/long-range planning.  Aide to provide assistance with personal care, ADLs, and vital signs.    MISCELLANEOUS CARE:  Wound Care Orders:  empty SHELBI drains daily and record output      Medications: Review discharge medications with patient and family and provide education.      There are no discharge medications for this patient.      I certify that this patient is confined to his home and needs intermittent skilled nursing care.

## 2019-09-03 NOTE — PHYSICIAN QUERY
PT Name: Kishan Sánchez  MR #: [id number]    Physician Query Form - Consultant Diagnosis Clarification     CDS/: Isela Gutierrez RN                 Contact information:[email or web address]  This form is a permanent document in the medical record.     Query Date: September 3, 2019      By submitting this query, we are merely seeking further clarification of documentation.  Please utilize your independent clinical judgment when addressing the question(s) below.      The Medical record contains the following:   Diagnosis Supporting Clinical Information Location in Medical Record   Stage 2 sacral spine  pressure injury, POA Wound care consulted for drain placement site and sacral area.   PMH: cystic hepatic mass, IR placement of perc cholecystostomy tube on 7/27/19  Assessment:  The IR drain is leaking around the tubing very slowly- tan/purulent/foul smelling liquid,   The sacral area and scrotum have MASD partial thickness skin loss. The sacral area also has a stage 2 pressure injury.    Discussed wound care with patient/verbalized understanding.   Treatment:  The IR drain site was cleansed and barrier film applied to skin to protect from moisture.  Dressing changed, tubing secured to abdomen.   The scrotal area has barrier paste noted, sacral wound has a foam dressing. Recommend using barrier paste BID to scrotum and sacral area.   Nursing to continue wound care, wound care to follow-up prn.    Wound 08/28/19 0318 Other (comment) medial Sacral Spine  Date First Assessed/Time First Assessed: 08/28/19 0318   Pre-existing: Yes  Primary Wound Type: Other (comment)  Orientation: medial  Location: (c) Sacral Spine Wound care CN 8/28         Do you agree with the Consultants diagnosis of _Stage 2 sacral spine pressure injury, POA___?    [x  ] Yes   [  ] Yes, but it resolved prior to my assessment of the patient   [  ] No   [  ] Clinically insignificant   [  ] Other/Clarification of findings:   [  ] Clinically  undetermined

## 2019-09-03 NOTE — PROGRESS NOTES
Pharmacist Intervention IV to PO Note    Kishan Sánchez is a 54 y.o. male being treated with IV medication famotidine    Patient Data:    Vital Signs (Most Recent):  Temp: 98.4 °F (36.9 °C) (09/03/19 1127)  Pulse: 91 (09/03/19 1127)  Resp: 18 (09/03/19 1127)  BP: 96/60 (09/03/19 1127)  SpO2: 100 % (09/03/19 1127) Vital Signs (72h Range):  Temp:  [97.3 °F (36.3 °C)-99.9 °F (37.7 °C)]   Pulse:  []   Resp:  [14-20]   BP: ()/(53-86)   SpO2:  [91 %-100 %]      CBC:  Recent Labs   Lab 09/01/19  0334 09/02/19  0539 09/03/19  0345   WBC 9.60 13.51* 12.27   RBC 3.38* 3.66* 3.01*   HGB 8.4* 9.0* 7.5*   HCT 27.4* 30.2* 24.6*   * 469* 431*   MCV 81* 83 82   MCH 24.9* 24.6* 24.9*   MCHC 30.7* 29.8* 30.5*     CMP:     Recent Labs   Lab 09/01/19  0620 09/02/19  0540 09/03/19  0345   GLU 99 85 90   CALCIUM 7.8* 7.6* 7.3*   ALBUMIN 1.2* 1.2* 1.1*   PROT 5.5* 6.0 5.6*   * 135* 139   K 4.9 4.9 3.9   CO2 25 26 28    105 107   BUN 7 6 9   CREATININE 0.6 0.6 0.6   ALKPHOS 229* 226* 221*   ALT 34 27 24   AST 30 25 27   BILITOT 0.6 0.5 0.3       Dietary Orders:  Diet Orders            Diet Adult Regular (IDDSI Level 7): Regular starting at 09/02 0814            Based on the following criteria, this patient qualifies for intravenous to oral conversion:  [x] The patients gastrointestinal tract is functioning (tolerating medications via oral or enteral route for 24 hours and tolerating food or enteral feeds for 24 hours).  [x] The patient is hemodynamically stable for 24 hours (heart rate <100 beats per minute, systolic blood pressure >99 mm Hg, and respiratory rate <20 breaths per minute).  [x] The patient shows clinical improvement (afebrile for at least 24 hours and white blood cell count downtrending or normalized). Additionally, the patient must be non-neutropenic (absolute neutrophil count >500 cells/mm3).  [x] For antimicrobials, the patient has received IV therapy for at least 24 hours.    IV  medication famotidine 20 mg twice daily will be changed to oral medication famotidine 20 mg twice daily    Pharmacist's Name: Mary Anne Tuttle  Pharmacist's Extension: [phone]

## 2019-09-03 NOTE — PLAN OF CARE
Problem: Adult Inpatient Plan of Care  Goal: Plan of Care Review  Outcome: Ongoing (interventions implemented as appropriate)  Patient AAOX4, no complaints of pain. Patient compliant with turning schedule but does require prompting to do so. Voiding without difficulty. Right abdominal SHELBI dressings and sacral foam dressing changed. No edema. TEDS/SCDS in place. Ate two sandwiches overnight. Hypotensive at baseline and asymptomatic. NSR on tele. Fall precautions in place. Will continue to monitor.

## 2019-09-03 NOTE — PROGRESS NOTES
[facility]  General Surgery  Progress Note    Subjective:     History of Present Illness:  Kishan Sánchez is a 53 y.o. 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 This onset two days ago and has been getting progressively worse. It is associated with a foul odor. Workup in the ED demonstrates slight decrease in size of the fluid collection via CT, though a large collection is still present. He was transferred to [facility] for IR assessment and management. On arrival to [facility], patient denies CP, F/C, N/V, diarrhea, abdominal pain, urinary changes, hematochezia, hematemesis. Patient is AF, VSS.      Post-Op Info:  Procedure(s) (LRB):  DRAINAGE, ABDOMEN, LAPAROSCOPIC-fenestration of liver cyst (N/A)  FENESTRATION of liver cyst.   3 Days Post-Op     Interval History: NAEON.  Denies fevers/chills.  Minimal pain. Tolerating diet. Noting drift in Hgb (9.0/30.2->7.5/24.6) this am; will reorder CBC at noon to follow up.     Medications:  Continuous Infusions:   sodium chloride 0.9% 50 mL/hr (08/28/19 1712)     Scheduled Meds:   enoxaparin  40 mg Subcutaneous Daily    famotidine (PF)  20 mg Intravenous BID    piperacillin-tazobactam (ZOSYN) IVPB  4.5 g Intravenous Q8H    sodium chloride 0.9%  10 mL Intravenous Q6H     PRN Meds:sodium chloride 0.9%, fentaNYL, oxyCODONE-acetaminophen, oxyCODONE-acetaminophen, sodium chloride 0.9%, Flushing PICC Protocol **AND** sodium chloride 0.9% **AND** sodium chloride 0.9%, sodium chloride 0.9%     Review of patient's allergies indicates:  No Known Allergies  Objective:     Vital Signs (Most Recent):  Temp: 98.1 °F (36.7 °C) (09/03/19 0726)  Pulse: 98 (09/03/19 0726)  Resp: 16 (09/03/19 0726)  BP: (!) 94/57 (09/03/19 0726)  SpO2: 97 % (09/03/19 0726) Vital Signs (24h Range):  Temp:  [97.3 °F (36.3 °C)-98.7 °F (37.1 °C)] 98.1 °F (36.7  °C)  Pulse:  [] 98  Resp:  [14-18] 16  SpO2:  [94 %-100 %] 97 %  BP: ()/(57-68) 94/57     Weight: 55.2 kg (121 lb 9.3 oz)  Body mass index is 20.87 kg/m².    Intake/Output - Last 3 Shifts       09/01 0700 - 09/02 0659 09/02 0700 - 09/03 0659 09/03 0700 - 09/04 0659    P.O. 1000      I.V. (mL/kg) 6812.2 (123.6) 20 (0.4)     IV Piggyback 300 100     Total Intake(mL/kg) 8112.2 (147.2) 120 (2.2)     Urine (mL/kg/hr) 3550 (2.7) 1670 (1.3) 220 (1.8)    Drains 275 152     Stool 0      Total Output 3825 1822 220    Net +4287.2 -1702 -220           Stool Occurrence 0 x            Physical Exam   Constitutional: He appears well-developed and well-nourished. No distress.   HENT:   Head: Normocephalic and atraumatic.   Cardiovascular: Normal rate and regular rhythm.   Pulmonary/Chest: Effort normal. No respiratory distress.   Abdominal: Soft. He exhibits no distension. There is no tenderness.   Soft, NTND  IR drain and judy drain in place. SS. Incisions clean, dry, and intact.        Significant Labs:  CBC:   Recent Labs   Lab 09/03/19  0345   WBC 12.27   RBC 3.01*   HGB 7.5*   HCT 24.6*   *   MCV 82   MCH 24.9*   MCHC 30.5*     CMP:   Recent Labs   Lab 09/03/19  0345   GLU 90   CALCIUM 7.3*   ALBUMIN 1.1*   PROT 5.6*      K 3.9   CO2 28      BUN 9   CREATININE 0.6   ALKPHOS 221*   ALT 24   AST 27   BILITOT 0.3       Significant Diagnostics:  I have reviewed all pertinent imaging results/findings within the past 24 hours.    Assessment/Plan:     * Liver cyst  54 y.o. male with a history of cystic hepatic mass s/p IR drain placement, presented to OSH due to leakage around drain and [place] [facility] [place]; currently with 8F in place. S/P 8/31 Laparoscopic cyst fenestration.  Cultures grew morganella morganii and pseudomonas.    Plan:  -continue to regular diet  -DC IVF  -bulb drains to suction  -Cont IV abx - switch to meropenem with ID consult  -PICC placement for IV abx; will plan  for IV meropenem at home  -GI and DVT ppx  -daily labs  -PRN pain meds.  -downdrifting Hgb; will follow up with CBC at noon            Johnna Pepper MD  General Surgery  [facility]

## 2019-09-04 LAB
ABO + RH BLD: NORMAL
ALBUMIN SERPL BCP-MCNC: 1.1 G/DL (ref 3.5–5.2)
ALP SERPL-CCNC: 211 U/L (ref 55–135)
ALT SERPL W/O P-5'-P-CCNC: 25 U/L (ref 10–44)
ANION GAP SERPL CALC-SCNC: 5 MMOL/L (ref 8–16)
AST SERPL-CCNC: 33 U/L (ref 10–40)
BASOPHILS # BLD AUTO: 0.03 K/UL (ref 0–0.2)
BASOPHILS # BLD AUTO: 0.06 K/UL (ref 0–0.2)
BASOPHILS NFR BLD: 0.3 % (ref 0–1.9)
BASOPHILS NFR BLD: 0.5 % (ref 0–1.9)
BILIRUB SERPL-MCNC: 0.3 MG/DL (ref 0.1–1)
BLD GP AB SCN CELLS X3 SERPL QL: NORMAL
BLD PROD TYP BPU: NORMAL
BLD PROD TYP BPU: NORMAL
BLOOD UNIT EXPIRATION DATE: NORMAL
BLOOD UNIT EXPIRATION DATE: NORMAL
BLOOD UNIT TYPE CODE: 6200
BLOOD UNIT TYPE CODE: 6200
BLOOD UNIT TYPE: NORMAL
BLOOD UNIT TYPE: NORMAL
BUN SERPL-MCNC: 11 MG/DL (ref 6–20)
CALCIUM SERPL-MCNC: 7.4 MG/DL (ref 8.7–10.5)
CHLORIDE SERPL-SCNC: 108 MMOL/L (ref 95–110)
CO2 SERPL-SCNC: 27 MMOL/L (ref 23–29)
CODING SYSTEM: NORMAL
CODING SYSTEM: NORMAL
CREAT SERPL-MCNC: 0.6 MG/DL (ref 0.5–1.4)
DIFFERENTIAL METHOD: ABNORMAL
DIFFERENTIAL METHOD: ABNORMAL
DISPENSE STATUS: NORMAL
DISPENSE STATUS: NORMAL
EOSINOPHIL # BLD AUTO: 0.1 K/UL (ref 0–0.5)
EOSINOPHIL # BLD AUTO: 0.2 K/UL (ref 0–0.5)
EOSINOPHIL NFR BLD: 1.1 % (ref 0–8)
EOSINOPHIL NFR BLD: 1.2 % (ref 0–8)
ERYTHROCYTE [DISTWIDTH] IN BLOOD BY AUTOMATED COUNT: 18.2 % (ref 11.5–14.5)
ERYTHROCYTE [DISTWIDTH] IN BLOOD BY AUTOMATED COUNT: 18.5 % (ref 11.5–14.5)
EST. GFR  (AFRICAN AMERICAN): >60 ML/MIN/1.73 M^2
EST. GFR  (NON AFRICAN AMERICAN): >60 ML/MIN/1.73 M^2
GLUCOSE SERPL-MCNC: 92 MG/DL (ref 70–110)
HCT VFR BLD AUTO: 22.2 % (ref 40–54)
HCT VFR BLD AUTO: 29.3 % (ref 40–54)
HGB BLD-MCNC: 6.8 G/DL (ref 14–18)
HGB BLD-MCNC: 9.1 G/DL (ref 14–18)
IMM GRANULOCYTES # BLD AUTO: 0.07 K/UL (ref 0–0.04)
IMM GRANULOCYTES # BLD AUTO: 0.08 K/UL (ref 0–0.04)
IMM GRANULOCYTES NFR BLD AUTO: 0.6 % (ref 0–0.5)
IMM GRANULOCYTES NFR BLD AUTO: 0.7 % (ref 0–0.5)
LYMPHOCYTES # BLD AUTO: 1.9 K/UL (ref 1–4.8)
LYMPHOCYTES # BLD AUTO: 2 K/UL (ref 1–4.8)
LYMPHOCYTES NFR BLD: 16.6 % (ref 18–48)
LYMPHOCYTES NFR BLD: 17.3 % (ref 18–48)
MAGNESIUM SERPL-MCNC: 1.4 MG/DL (ref 1.6–2.6)
MCH RBC QN AUTO: 24.8 PG (ref 27–31)
MCH RBC QN AUTO: 26.1 PG (ref 27–31)
MCHC RBC AUTO-ENTMCNC: 30.6 G/DL (ref 32–36)
MCHC RBC AUTO-ENTMCNC: 31.1 G/DL (ref 32–36)
MCV RBC AUTO: 81 FL (ref 82–98)
MCV RBC AUTO: 84 FL (ref 82–98)
MONOCYTES # BLD AUTO: 0.7 K/UL (ref 0.3–1)
MONOCYTES # BLD AUTO: 0.8 K/UL (ref 0.3–1)
MONOCYTES NFR BLD: 6.5 % (ref 4–15)
MONOCYTES NFR BLD: 6.7 % (ref 4–15)
NEUTROPHILS # BLD AUTO: 8.1 K/UL (ref 1.8–7.7)
NEUTROPHILS # BLD AUTO: 9 K/UL (ref 1.8–7.7)
NEUTROPHILS NFR BLD: 73.9 % (ref 38–73)
NEUTROPHILS NFR BLD: 74.6 % (ref 38–73)
NRBC BLD-RTO: 0 /100 WBC
NRBC BLD-RTO: 0 /100 WBC
PHOSPHATE SERPL-MCNC: 2.1 MG/DL (ref 2.7–4.5)
PLATELET # BLD AUTO: 426 K/UL (ref 150–350)
PLATELET # BLD AUTO: 436 K/UL (ref 150–350)
PMV BLD AUTO: 10 FL (ref 9.2–12.9)
PMV BLD AUTO: 10.3 FL (ref 9.2–12.9)
POTASSIUM SERPL-SCNC: 4.3 MMOL/L (ref 3.5–5.1)
PROT SERPL-MCNC: 5.7 G/DL (ref 6–8.4)
RBC # BLD AUTO: 2.74 M/UL (ref 4.6–6.2)
RBC # BLD AUTO: 3.49 M/UL (ref 4.6–6.2)
SODIUM SERPL-SCNC: 140 MMOL/L (ref 136–145)
TRANS ERYTHROCYTES VOL PATIENT: NORMAL ML
TRANS ERYTHROCYTES VOL PATIENT: NORMAL ML
WBC # BLD AUTO: 11.02 K/UL (ref 3.9–12.7)
WBC # BLD AUTO: 12.09 K/UL (ref 3.9–12.7)

## 2019-09-04 PROCEDURE — 63600175 PHARM REV CODE 636 W HCPCS: Performed by: STUDENT IN AN ORGANIZED HEALTH CARE EDUCATION/TRAINING PROGRAM

## 2019-09-04 PROCEDURE — P9021 RED BLOOD CELLS UNIT: HCPCS

## 2019-09-04 PROCEDURE — 84100 ASSAY OF PHOSPHORUS: CPT

## 2019-09-04 PROCEDURE — 36430 TRANSFUSION BLD/BLD COMPNT: CPT

## 2019-09-04 PROCEDURE — 86901 BLOOD TYPING SEROLOGIC RH(D): CPT

## 2019-09-04 PROCEDURE — 80053 COMPREHEN METABOLIC PANEL: CPT

## 2019-09-04 PROCEDURE — 83735 ASSAY OF MAGNESIUM: CPT

## 2019-09-04 PROCEDURE — 25000003 PHARM REV CODE 250: Performed by: STUDENT IN AN ORGANIZED HEALTH CARE EDUCATION/TRAINING PROGRAM

## 2019-09-04 PROCEDURE — 86920 COMPATIBILITY TEST SPIN: CPT

## 2019-09-04 PROCEDURE — 25000003 PHARM REV CODE 250: Performed by: SURGERY

## 2019-09-04 PROCEDURE — 85025 COMPLETE CBC W/AUTO DIFF WBC: CPT

## 2019-09-04 PROCEDURE — 20600001 HC STEP DOWN PRIVATE ROOM

## 2019-09-04 PROCEDURE — 94761 N-INVAS EAR/PLS OXIMETRY MLT: CPT

## 2019-09-04 PROCEDURE — A4216 STERILE WATER/SALINE, 10 ML: HCPCS | Performed by: SURGERY

## 2019-09-04 RX ORDER — SODIUM,POTASSIUM PHOSPHATES 280-250MG
2 POWDER IN PACKET (EA) ORAL ONCE
Status: COMPLETED | OUTPATIENT
Start: 2019-09-04 | End: 2019-09-04

## 2019-09-04 RX ORDER — HYDROCODONE BITARTRATE AND ACETAMINOPHEN 500; 5 MG/1; MG/1
TABLET ORAL
Status: DISCONTINUED | OUTPATIENT
Start: 2019-09-04 | End: 2019-09-05 | Stop reason: HOSPADM

## 2019-09-04 RX ORDER — LANOLIN ALCOHOL/MO/W.PET/CERES
400 CREAM (GRAM) TOPICAL ONCE
Status: COMPLETED | OUTPATIENT
Start: 2019-09-04 | End: 2019-09-04

## 2019-09-04 RX ADMIN — Medication 10 ML: at 01:09

## 2019-09-04 RX ADMIN — OXYCODONE HYDROCHLORIDE AND ACETAMINOPHEN 1 TABLET: 10; 325 TABLET ORAL at 01:09

## 2019-09-04 RX ADMIN — PIPERACILLIN AND TAZOBACTAM 4.5 G: 4; .5 INJECTION, POWDER, LYOPHILIZED, FOR SOLUTION INTRAVENOUS; PARENTERAL at 02:09

## 2019-09-04 RX ADMIN — PIPERACILLIN AND TAZOBACTAM 4.5 G: 4; .5 INJECTION, POWDER, LYOPHILIZED, FOR SOLUTION INTRAVENOUS; PARENTERAL at 11:09

## 2019-09-04 RX ADMIN — MAGNESIUM OXIDE TAB 400 MG (241.3 MG ELEMENTAL MG) 400 MG: 400 (241.3 MG) TAB at 11:09

## 2019-09-04 RX ADMIN — OXYCODONE HYDROCHLORIDE AND ACETAMINOPHEN 1 TABLET: 10; 325 TABLET ORAL at 08:09

## 2019-09-04 RX ADMIN — Medication 10 ML: at 06:09

## 2019-09-04 RX ADMIN — POTASSIUM & SODIUM PHOSPHATES POWDER PACK 280-160-250 MG 2 PACKET: 280-160-250 PACK at 11:09

## 2019-09-04 RX ADMIN — FAMOTIDINE 20 MG: 20 TABLET, FILM COATED ORAL at 08:09

## 2019-09-04 RX ADMIN — Medication 10 ML: at 11:09

## 2019-09-04 RX ADMIN — ENOXAPARIN SODIUM 40 MG: 100 INJECTION SUBCUTANEOUS at 05:09

## 2019-09-04 RX ADMIN — FAMOTIDINE 20 MG: 20 TABLET, FILM COATED ORAL at 09:09

## 2019-09-04 RX ADMIN — PIPERACILLIN AND TAZOBACTAM 4.5 G: 4; .5 INJECTION, POWDER, LYOPHILIZED, FOR SOLUTION INTRAVENOUS; PARENTERAL at 08:09

## 2019-09-04 NOTE — PLAN OF CARE
(SCARLET) learned in morning huddle that Pt is ready to discharge pending Letter of Agreement with [facility].    SCARLET called Manolo with [facility] ([phone]) who reported that she sent the Orders to her office and is awaiting a cost analysis and estimate. Once she receives the estimate (which includes cost of ABX, supplies and Nursing), she will email to SCARLET who will then send to Case Management Unit Director who will report whether the department is agreeable to covering costs.     SCARLET will continue to follow and offer support as needed. SCARLET in communication with Pt's , Erin YOU     09/04/19 1040   Post-Acute Status   Post-Acute Authorization Medications   Medication Status Pending Prior Authorization  (IV ABX)   Ivon Cooper, [facility]  -[phone]

## 2019-09-04 NOTE — SUBJECTIVE & OBJECTIVE
Interval History: NAEON.  Denies fevers/chills.  Minimal pain. Tolerating diet. Drifting Hgb 9->7.6->6.8 this AM.  Pt denies any new complaints.     Medications:  Continuous Infusions:   sodium chloride 0.9% 50 mL/hr (08/28/19 1712)     Scheduled Meds:   enoxaparin  40 mg Subcutaneous Daily    famotidine  20 mg Oral BID    piperacillin-tazobactam (ZOSYN) IVPB  4.5 g Intravenous Q8H    sodium chloride 0.9%  10 mL Intravenous Q6H     PRN Meds:sodium chloride, sodium chloride 0.9%, fentaNYL, oxyCODONE-acetaminophen, oxyCODONE-acetaminophen, sodium chloride 0.9%, Flushing PICC Protocol **AND** sodium chloride 0.9% **AND** sodium chloride 0.9%, sodium chloride 0.9%     Review of patient's allergies indicates:  No Known Allergies  Objective:     Vital Signs (Most Recent):  Temp: 98 °F (36.7 °C) (09/04/19 0758)  Pulse: 74 (09/04/19 0758)  Resp: 16 (09/04/19 0758)  BP: 101/68 (09/04/19 0758)  SpO2: 100 % (09/04/19 0808) Vital Signs (24h Range):  Temp:  [98 °F (36.7 °C)-98.5 °F (36.9 °C)] 98 °F (36.7 °C)  Pulse:  [] 74  Resp:  [16-18] 16  SpO2:  [100 %] 100 %  BP: ()/(56-68) 101/68     Weight: 55.2 kg (121 lb 9.3 oz)  Body mass index is 20.87 kg/m².    Intake/Output - Last 3 Shifts       09/02 0700 - 09/03 0659 09/03 0700 - 09/04 0659 09/04 0700 - 09/05 0659    P.O.  600     I.V. (mL/kg) 20 (0.4) 33 (0.6)     IV Piggyback 300 200     Total Intake(mL/kg) 320 (5.8) 833 (15.1)     Urine (mL/kg/hr) 1670 (1.3) 2150 (1.6) 350 (2.4)    Drains 152 220     Stool  0     Total Output 1822 2370 350    Net -1502 -1537 -350           Stool Occurrence  1 x           Physical Exam   Constitutional: He appears well-developed and well-nourished. No distress.   HENT:   Head: Normocephalic and atraumatic.   Cardiovascular: Normal rate and regular rhythm.   Pulmonary/Chest: Effort normal. No respiratory distress.   Abdominal: Soft. He exhibits no distension. There is no tenderness.   Soft, NTND  IR drain and judy drain in  place. SS. Incisions clean, dry, and intact.        Significant Labs:  CBC:   Recent Labs   Lab 09/04/19  0521   WBC 11.02   RBC 2.74*   HGB 6.8*   HCT 22.2*   *   MCV 81*   MCH 24.8*   MCHC 30.6*     CMP:   Recent Labs   Lab 09/04/19  0521   GLU 92   CALCIUM 7.4*   ALBUMIN 1.1*   PROT 5.7*      K 4.3   CO2 27      BUN 11   CREATININE 0.6   ALKPHOS 211*   ALT 25   AST 33   BILITOT 0.3       Significant Diagnostics:  I have reviewed all pertinent imaging results/findings within the past 24 hours.

## 2019-09-04 NOTE — PLAN OF CARE
Problem: Adult Inpatient Plan of Care  Goal: Plan of Care Review  Plan of care reviewed with pt/verbalized understanding, patient c/o of incisional pain relieved by po pain medications, patient ambulatory to bathroom with assistance from nurse and walker, patient wound to sacrum covered with mepilex pt states wound hurts and prevent him from sleeping @hs, call-light within reach, will continue to monitor.

## 2019-09-04 NOTE — PLAN OF CARE
"09:30 AM Discussed D/c plan,Needs 2 weeks IV Antibiotics, date: today, barrier: No payor source, informing  sent referral to [facility] via  for price yesterday,w/covering SCARLET, NI Cooper/[id number].   Letter of Agreement is needed. SCARLET to follow-up on.     12:40 PM CM visited patient to update on above.He verbalized his understanding. CM confirmed w/him, he does not have health insurance;Informed him he has been denied Medicaid. He states he no longer is working, because of his illness. He asks CM to call his family to discuss w/his 2 sisters.    2:00 PM-2:45 PM  CM rec'd call from CM Leadership, AZALEA Chavez,  /[id number], who confirms they have the Letter of Agreement from [facility] & states CM Leadership's decision is for [facility] to discuss payment plan for patient home 2 wk Abx, cost $1,421.00. CM spoke to Sister: CORNELIA Pineda, C [phone], discussing above. She verbalized her understanding & states, "Shouldn't Medicaid pay for this?" CM informed her he ws screened for Medicaid w/being denied for being over income.She states, "He's not able to work since 7/2019 due to being sick." CM reiterated above & told her to expect a call from [facility] To discuss payment plan he will be responsible for. She verbalized her understanding.     CM spoke to Ree Marroquin/[id number], Hospital Director re: Screening for Medicaid,  Who states patient will need to get a letter or pink slip stating he is no longer working from Employer for her to be able to appeal denial for patient. This will take 30 days to complete;Patient is to keep medical bills for reimbursement should he be approved for Medicaid.       CM called Manolo [facility] Nurse/C [phone], updated on above.        "

## 2019-09-04 NOTE — ASSESSMENT & PLAN NOTE
54 y.o. 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 S/P 8/31 Laparoscopic cyst fenestration.  Cultures grew morganella morganii and pseudomonas.    Plan:  -downtrending Hgb, 6.8 this AM.  Will get blood consent and transfuse 2uPRBC  -continue to regular diet  -bulb drains to suction  -Cont IV abx - Will require 2 week course of Zosyn (PICC Line in place)  -Cont to work on Dispo.  Will require IV abx at discharge  -GI and DVT ppx  -daily labs  -PRN pain meds.

## 2019-09-04 NOTE — PROGRESS NOTES
[facility]  General Surgery  Progress Note    Subjective:     History of Present Illness:  Kishan Sánchez is a 53 y.o. 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 This onset two days ago and has been getting progressively worse. It is associated with a foul odor. Workup in the ED demonstrates slight decrease in size of the fluid collection via CT, though a large collection is still present. [place] [facility] [place] and management. [place] [facility] patient denies CP, F/C, N/V, diarrhea, abdominal pain, urinary changes, hematochezia, hematemesis. Patient is AF, VSS.      Post-Op Info:  Procedure(s) (LRB):  DRAINAGE, ABDOMEN, LAPAROSCOPIC-fenestration of liver cyst (N/A)  FENESTRATION of liver cyst.   4 Days Post-Op     Interval History: NAEON.  Denies fevers/chills.  Minimal pain. Tolerating diet. Drifting Hgb 9->7.6->6.8 this AM.  Pt denies any new complaints.     Medications:  Continuous Infusions:   sodium chloride 0.9% 50 mL/hr (08/28/19 1712)     Scheduled Meds:   enoxaparin  40 mg Subcutaneous Daily    famotidine  20 mg Oral BID    piperacillin-tazobactam (ZOSYN) IVPB  4.5 g Intravenous Q8H    sodium chloride 0.9%  10 mL Intravenous Q6H     PRN Meds:sodium chloride, sodium chloride 0.9%, fentaNYL, oxyCODONE-acetaminophen, oxyCODONE-acetaminophen, sodium chloride 0.9%, Flushing PICC Protocol **AND** sodium chloride 0.9% **AND** sodium chloride 0.9%, sodium chloride 0.9%     Review of patient's allergies indicates:  No Known Allergies  Objective:     Vital Signs (Most Recent):  Temp: 98 °F (36.7 °C) (09/04/19 0758)  Pulse: 74 (09/04/19 0758)  Resp: 16 (09/04/19 0758)  BP: 101/68 (09/04/19 0758)  SpO2: 100 % (09/04/19 0808) Vital Signs (24h Range):  Temp:  [98 °F (36.7 °C)-98.5 °F (36.9 °C)] 98 °F (36.7 °C)  Pulse:  []  74  Resp:  [16-18] 16  SpO2:  [100 %] 100 %  BP: ()/(56-68) 101/68     Weight: 55.2 kg (121 lb 9.3 oz)  Body mass index is 20.87 kg/m².    Intake/Output - Last 3 Shifts       09/02 0700 - 09/03 0659 09/03 0700 - 09/04 0659 09/04 0700 - 09/05 0659    P.O.  600     I.V. (mL/kg) 20 (0.4) 33 (0.6)     IV Piggyback 300 200     Total Intake(mL/kg) 320 (5.8) 833 (15.1)     Urine (mL/kg/hr) 1670 (1.3) 2150 (1.6) 350 (2.4)    Drains 152 220     Stool  0     Total Output 1822 2370 350    Net -1502 -1537 -350           Stool Occurrence  1 x           Physical Exam   Constitutional: He appears well-developed and well-nourished. No distress.   HENT:   Head: Normocephalic and atraumatic.   Cardiovascular: Normal rate and regular rhythm.   Pulmonary/Chest: Effort normal. No respiratory distress.   Abdominal: Soft. He exhibits no distension. There is no tenderness.   Soft, NTND  IR drain and judy drain in place. SS. Incisions clean, dry, and intact.        Significant Labs:  CBC:   Recent Labs   Lab 09/04/19  0521   WBC 11.02   RBC 2.74*   HGB 6.8*   HCT 22.2*   *   MCV 81*   MCH 24.8*   MCHC 30.6*     CMP:   Recent Labs   Lab 09/04/19  0521   GLU 92   CALCIUM 7.4*   ALBUMIN 1.1*   PROT 5.7*      K 4.3   CO2 27      BUN 11   CREATININE 0.6   ALKPHOS 211*   ALT 25   AST 33   BILITOT 0.3       Significant Diagnostics:  I have reviewed all pertinent imaging results/findings within the past 24 hours.    Assessment/Plan:     * Liver cyst  54 y.o. male with a history of cystic hepatic mass s/p IR drain placement, presented to OSH due to leakage around drain and [place] [facility] [place]; currently with 8F in place. S/P 8/31 Laparoscopic cyst fenestration.  Cultures grew morganella morganii and pseudomonas.    Plan:  -downtrending Hgb, 6.8 this AM.  Will get blood consent and transfuse 2uPRBC  -continue to regular diet  -bulb drains to suction  -Cont IV abx - Will require 2 week course of Zosyn (PICC  Line in place)  -Cont to work on Dispo.  Will require IV abx at discharge  -GI and DVT ppx  -daily labs  -PRN pain meds.            Nelly Owens MD  General Surgery  [facility]

## 2019-09-05 VITALS
HEART RATE: 96 BPM | RESPIRATION RATE: 17 BRPM | HEIGHT: 64 IN | WEIGHT: 121.56 LBS | SYSTOLIC BLOOD PRESSURE: 111 MMHG | DIASTOLIC BLOOD PRESSURE: 72 MMHG | BODY MASS INDEX: 20.75 KG/M2 | TEMPERATURE: 98 F | OXYGEN SATURATION: 100 %

## 2019-09-05 LAB
ALBUMIN SERPL BCP-MCNC: 1.2 G/DL (ref 3.5–5.2)
ALP SERPL-CCNC: 210 U/L (ref 55–135)
ALT SERPL W/O P-5'-P-CCNC: 22 U/L (ref 10–44)
ANION GAP SERPL CALC-SCNC: 8 MMOL/L (ref 8–16)
AST SERPL-CCNC: 31 U/L (ref 10–40)
BACTERIA SPEC ANAEROBE CULT: NORMAL
BASOPHILS # BLD AUTO: 0.04 K/UL (ref 0–0.2)
BASOPHILS NFR BLD: 0.4 % (ref 0–1.9)
BILIRUB SERPL-MCNC: 0.4 MG/DL (ref 0.1–1)
BUN SERPL-MCNC: 11 MG/DL (ref 6–20)
CALCIUM SERPL-MCNC: 7.5 MG/DL (ref 8.7–10.5)
CHLORIDE SERPL-SCNC: 106 MMOL/L (ref 95–110)
CO2 SERPL-SCNC: 26 MMOL/L (ref 23–29)
CREAT SERPL-MCNC: 0.6 MG/DL (ref 0.5–1.4)
DIFFERENTIAL METHOD: ABNORMAL
EOSINOPHIL # BLD AUTO: 0.1 K/UL (ref 0–0.5)
EOSINOPHIL NFR BLD: 1.3 % (ref 0–8)
ERYTHROCYTE [DISTWIDTH] IN BLOOD BY AUTOMATED COUNT: 18.4 % (ref 11.5–14.5)
EST. GFR  (AFRICAN AMERICAN): >60 ML/MIN/1.73 M^2
EST. GFR  (NON AFRICAN AMERICAN): >60 ML/MIN/1.73 M^2
GLUCOSE SERPL-MCNC: 108 MG/DL (ref 70–110)
HCT VFR BLD AUTO: 30.6 % (ref 40–54)
HGB BLD-MCNC: 9.2 G/DL (ref 14–18)
IMM GRANULOCYTES # BLD AUTO: 0.05 K/UL (ref 0–0.04)
IMM GRANULOCYTES NFR BLD AUTO: 0.5 % (ref 0–0.5)
LYMPHOCYTES # BLD AUTO: 1.7 K/UL (ref 1–4.8)
LYMPHOCYTES NFR BLD: 17.5 % (ref 18–48)
MAGNESIUM SERPL-MCNC: 1.4 MG/DL (ref 1.6–2.6)
MCH RBC QN AUTO: 25.8 PG (ref 27–31)
MCHC RBC AUTO-ENTMCNC: 30.1 G/DL (ref 32–36)
MCV RBC AUTO: 86 FL (ref 82–98)
MONOCYTES # BLD AUTO: 0.7 K/UL (ref 0.3–1)
MONOCYTES NFR BLD: 7.7 % (ref 4–15)
NEUTROPHILS # BLD AUTO: 7 K/UL (ref 1.8–7.7)
NEUTROPHILS NFR BLD: 72.6 % (ref 38–73)
NRBC BLD-RTO: 0 /100 WBC
PHOSPHATE SERPL-MCNC: 2.2 MG/DL (ref 2.7–4.5)
PLATELET # BLD AUTO: 410 K/UL (ref 150–350)
PMV BLD AUTO: 9.8 FL (ref 9.2–12.9)
POTASSIUM SERPL-SCNC: 4 MMOL/L (ref 3.5–5.1)
PROT SERPL-MCNC: 5.9 G/DL (ref 6–8.4)
RBC # BLD AUTO: 3.56 M/UL (ref 4.6–6.2)
SODIUM SERPL-SCNC: 140 MMOL/L (ref 136–145)
WBC # BLD AUTO: 9.6 K/UL (ref 3.9–12.7)

## 2019-09-05 PROCEDURE — 80053 COMPREHEN METABOLIC PANEL: CPT

## 2019-09-05 PROCEDURE — 25000003 PHARM REV CODE 250: Performed by: STUDENT IN AN ORGANIZED HEALTH CARE EDUCATION/TRAINING PROGRAM

## 2019-09-05 PROCEDURE — 63600175 PHARM REV CODE 636 W HCPCS: Performed by: STUDENT IN AN ORGANIZED HEALTH CARE EDUCATION/TRAINING PROGRAM

## 2019-09-05 PROCEDURE — A4216 STERILE WATER/SALINE, 10 ML: HCPCS | Performed by: SURGERY

## 2019-09-05 PROCEDURE — 84100 ASSAY OF PHOSPHORUS: CPT

## 2019-09-05 PROCEDURE — 83735 ASSAY OF MAGNESIUM: CPT

## 2019-09-05 PROCEDURE — 25000003 PHARM REV CODE 250: Performed by: SURGERY

## 2019-09-05 PROCEDURE — 85025 COMPLETE CBC W/AUTO DIFF WBC: CPT

## 2019-09-05 RX ORDER — OXYCODONE AND ACETAMINOPHEN 5; 325 MG/1; MG/1
1 TABLET ORAL EVERY 6 HOURS PRN
Status: DISCONTINUED | OUTPATIENT
Start: 2019-09-05 | End: 2019-09-05 | Stop reason: HOSPADM

## 2019-09-05 RX ORDER — OXYCODONE AND ACETAMINOPHEN 5; 325 MG/1; MG/1
1 TABLET ORAL EVERY 6 HOURS PRN
Qty: 20 TABLET | Refills: 0 | Status: SHIPPED | OUTPATIENT
Start: 2019-09-05 | End: 2019-09-25

## 2019-09-05 RX ORDER — LANOLIN ALCOHOL/MO/W.PET/CERES
800 CREAM (GRAM) TOPICAL ONCE
Status: COMPLETED | OUTPATIENT
Start: 2019-09-05 | End: 2019-09-05

## 2019-09-05 RX ORDER — SODIUM,POTASSIUM PHOSPHATES 280-250MG
2 POWDER IN PACKET (EA) ORAL ONCE
Status: COMPLETED | OUTPATIENT
Start: 2019-09-05 | End: 2019-09-05

## 2019-09-05 RX ADMIN — Medication 10 ML: at 06:09

## 2019-09-05 RX ADMIN — Medication 10 ML: at 12:09

## 2019-09-05 RX ADMIN — POTASSIUM & SODIUM PHOSPHATES POWDER PACK 280-160-250 MG 2 PACKET: 280-160-250 PACK at 08:09

## 2019-09-05 RX ADMIN — MAGNESIUM OXIDE TAB 400 MG (241.3 MG ELEMENTAL MG) 800 MG: 400 (241.3 MG) TAB at 08:09

## 2019-09-05 RX ADMIN — OXYCODONE HYDROCHLORIDE AND ACETAMINOPHEN 1 TABLET: 10; 325 TABLET ORAL at 02:09

## 2019-09-05 RX ADMIN — PIPERACILLIN AND TAZOBACTAM 4.5 G: 4; .5 INJECTION, POWDER, LYOPHILIZED, FOR SOLUTION INTRAVENOUS; PARENTERAL at 02:09

## 2019-09-05 RX ADMIN — PIPERACILLIN AND TAZOBACTAM 4.5 G: 4; .5 INJECTION, POWDER, LYOPHILIZED, FOR SOLUTION INTRAVENOUS; PARENTERAL at 11:09

## 2019-09-05 RX ADMIN — ENOXAPARIN SODIUM 40 MG: 100 INJECTION SUBCUTANEOUS at 06:09

## 2019-09-05 RX ADMIN — Medication 10 ML: at 11:09

## 2019-09-05 NOTE — PLAN OF CARE
10:00 AM CM visited patient. He is AAOX4. Discussed:1. Case Management Dept is reviewing [facility] Letter of Agreement to see if they will pay for his home 2 weeks IV Antibiotics. 2. Once this is approved, [facility] Nurse will teach him on how to give his home infusions, & give supplies before he is discharged today. Asked if he can read & write? He states yes, & he verbalized his understanding. He confirms he has a ride home.     CM informed him, regarding the denial for Medicaid, He can appeal this if he can get a letter or pink slip stating he is no longer working from Employer. This will take 30 days to complete. He verbalized his understanding.     Updated his floor nurse, Tressa/[phone], on above. Reminded her patient will be discharged home w/PICC line in for home IV Abx treatment.     See  notes for updates.     2:00 PM JENNIFER spoke to his sister, [phone], updated on above. She states he is special, & can not read or write;He has only been a  for his jobs. CM informed her family can be instructed. She states she will come & learn, along w/her daughter-Martha. However Martha does not get off work until 4 PM, & earliest they can be here is 6 PM, due to they are coming from [place].     Updated her on above, regarding patient can appeal for Medicaid denial. She states she spoke to a Medicaid representative, & know what to do. She will get a letter from patient's prior Employer & fax it to them. She has their fax #.     Updated , & [facility] nurses, Manolo/ph [phone] & Rochelle/ph [phone]. Rochelle is w/patient now & pt told her he can not read or write.     2:25 PM Rochelle, [facility] nurse (ph # above), called CM stating she has used pamphlet w/drawings to instruct patient, & spoke to one of his sisters', Vijaya, on phone giving infusion instructions. CM informed her that CM has been talking w/patient's other sister, Miriam, who is POA, the past 2 days, who  states she & her daughter will be here for 6 PM for infusion teaching. Rochelle states she spoke to Vijaya, for this is who the patient told her to talk to, & she is concerned the family will get here later than 6 PM. JENNIFER called Miriam, while JENNIFER had Rochelle on other line, informing of importance of being here for 6 PM today, as she agreed to vs can she stay at hospital over night to get instruction in AM? She states she works in morning, so does her daughter, so they can not stay overnight. She confirms she will be here for 6 PM.     Rochelle updated. JENNIFER updated floor nurseTressa.     2:55 PM Manolo (Ph # above), [facility] nurse states she will return at 6 PM to teach patient's family on home IV Infusion.

## 2019-09-05 NOTE — PROGRESS NOTES
[facility]  General Surgery  Progress Note    Subjective:     History of Present Illness:  Kishan Sánchez is a 53 y.o. 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 This onset two days ago and has been getting progressively worse. It is associated with a foul odor. Workup in the ED demonstrates slight decrease in size of the fluid collection via CT, though a large collection is still present. He was transferred to [facility] for IR assessment and management. On arrival to [facility], patient denies CP, F/C, N/V, diarrhea, abdominal pain, urinary changes, hematochezia, hematemesis. Patient is AF, VSS.      Post-Op Info:  Procedure(s) (LRB):  DRAINAGE, ABDOMEN, LAPAROSCOPIC-fenestration of liver cyst (N/A)  FENESTRATION of liver cyst.   5 Days Post-Op     Interval History: No acute events overnight. Pt seen and examined at the bedside. Vital signs stable. No evidence of bleeding. H/h stable after transfusion this morning. Feeling well. Tolerating diet. Awaiting dispo.      Medications:  Continuous Infusions:   sodium chloride 0.9% 50 mL/hr (08/28/19 1712)     Scheduled Meds:   enoxaparin  40 mg Subcutaneous Daily    magnesium oxide  800 mg Oral Once    piperacillin-tazobactam (ZOSYN) IVPB  4.5 g Intravenous Q8H    sodium chloride 0.9%  10 mL Intravenous Q6H     PRN Meds:sodium chloride, sodium chloride 0.9%, oxyCODONE-acetaminophen, sodium chloride 0.9%, Flushing PICC Protocol **AND** sodium chloride 0.9% **AND** sodium chloride 0.9%, sodium chloride 0.9%     Review of patient's allergies indicates:  No Known Allergies  Objective:     Vital Signs (Most Recent):  Temp: 98 °F (36.7 °C) (09/05/19 0614)  Pulse: 84 (09/05/19 0614)  Resp: 18 (09/05/19 0614)  BP: 98/64 (09/05/19 0614)  SpO2: 99 % (09/05/19 0614) Vital Signs (24h Range):  Temp:  [97.4 °F (36.3 °C)-98.8 °F (37.1  °C)] 98 °F (36.7 °C)  Pulse:  [74-96] 84  Resp:  [16-18] 18  SpO2:  [99 %-100 %] 99 %  BP: ()/(57-69) 98/64     Weight: 55.2 kg (121 lb 9.3 oz)  Body mass index is 20.87 kg/m².    Intake/Output - Last 3 Shifts       09/03 0700 - 09/04 0659 09/04 0700 - 09/05 0659 09/05 0700 - 09/06 0659    P.O. 600 240     I.V. (mL/kg) 33 (0.6)      Blood  608.8     IV Piggyback 200      Total Intake(mL/kg) 833 (15.1) 848.8 (15.4)     Urine (mL/kg/hr) 2150 (1.6) 1800 (1.4)     Drains 220 255     Stool 0 0     Total Output 2370 2055     Net -1537 -1206.3            Stool Occurrence 1 x 1 x           Physical Exam   Constitutional: He appears well-developed and well-nourished. No distress.   HENT:   Head: Normocephalic and atraumatic.   Cardiovascular: Normal rate and regular rhythm.   Pulmonary/Chest: Effort normal. No respiratory distress.   Abdominal: Soft. He exhibits no distension. There is no tenderness.   Soft, NTND  IR drain and judy drain in place. SS. Incisions clean, dry, and intact.        Significant Labs:  CBC:   Recent Labs   Lab 09/05/19  0450   WBC 9.60   RBC 3.56*   HGB 9.2*   HCT 30.6*   *   MCV 86   MCH 25.8*   MCHC 30.1*     CMP:   Recent Labs   Lab 09/05/19  0450      CALCIUM 7.5*   ALBUMIN 1.2*   PROT 5.9*      K 4.0   CO2 26      BUN 11   CREATININE 0.6   ALKPHOS 210*   ALT 22   AST 31   BILITOT 0.4       Significant Diagnostics:  I have reviewed all pertinent imaging results/findings within the past 24 hours.    Assessment/Plan:     * Liver cyst  54 y.o. male with a history of cystic hepatic mass s/p IR drain placement, presented to OSH due to leakage around drain and transferred to [facility] for IR assessment; currently with 8F in place. S/P 8/31 Laparoscopic cyst fenestration.  Cultures grew morganella morganii and pseudomonas.    Plan:  -Transfused two units yesterday, h/h stable  -continue to regular diet  -bulb drains to suction  -Cont IV abx - Will require 2 week course of  Zosyn (PICC Line in place)  -Cont to work on Dispo.  Will require IV abx at discharge  -DVT ppx  -daily labs  -PRN pain meds.            Chase Mckeon MD  General Surgery  [facility]

## 2019-09-05 NOTE — DISCHARGE SUMMARY
[facility]  General Surgery  Discharge Summary      Patient Name: Kishan Sánchez  MRN: [id number]  Admission Date: 8/28/2019  Hospital Length of Stay: 8 days  Discharge Date and Time:  09/05/2019 2:58 PM  Attending Physician: Byron Morley MD   Discharging Provider: Syl Lares PA-C  Primary Care Provider: Primary Doctor No    HPI:   Kishan Sánchez is a 53 y.o. 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 This onset two days ago and has been getting progressively worse. It is associated with a foul odor. Workup in the ED demonstrates slight decrease in size of the fluid collection via CT, though a large collection is still present. He [place] [facility] [place] and management. [place] [facility] patient denies CP, F/C, N/V, diarrhea, abdominal pain, urinary changes, hematochezia, hematemesis. Patient is AF, VSS.      Procedure(s) (LRB):  DRAINAGE, ABDOMEN, LAPAROSCOPIC-fenestration of liver cyst (N/A)  FENESTRATION of liver cyst.      Indwelling Lines/Drains at time of discharge:   Lines/Drains/Airways     Peripherally Inserted Central Catheter Line                 PICC Double Lumen 09/02/19 1048 right brachial 3 days          Drain                 Closed/Suction Drain 08/31/19 0918 Right Abdomen Bulb 19 Fr. 5 days         Closed/Suction Drain 08/31/19 0918 Right Abdomen Bulb 24 Fr. 5 days              Hospital Course:   Patient presented to the ER with liver cysts. He underwent: Procedure(s) (LRB):  DRAINAGE, ABDOMEN, LAPAROSCOPIC-fenestration of liver cyst (N/A)  FENESTRATION of liver cyst.. He tolerated the procedure well and was transferred to the floor after recovery from anesthesia. Please see the operative note for further procedure details. Vitals remained stable, and he was afebrile all throughout the post operative period. Labs  were reviewed and electrolytes were replaced appropriately. Physical exam was appropriate for post operative state. He will require IV abx for two weeks after surgery. Diet was advanced, and he was able to tolerate a regular diet prior to discharge. He was ambulating without difficulty and had normal bowel function prior to discharge. Patient was deemed suitable for discharge on 5 Days Post-Op. He was discharged home with medications and instructions as below. He voiced understanding of the instructions prior to discharge.     For more thorough information, please refer to the hospital record and operative report.    Consults:   Consults (From admission, onward)        Status Ordering Provider     Inpatient consult to Interventional Radiology  Once     Provider:  (Not yet assigned)    Completed MESERET HORTA     Inpatient consult to PICC team (NIAS)  Once     Provider:  (Not yet assigned)    Completed DIANA CUMMINS          Significant Diagnostic Studies: Labs:   BMP:   Recent Labs   Lab 09/04/19 0521 09/05/19  0450   GLU 92 108    140   K 4.3 4.0    106   CO2 27 26   BUN 11 11   CREATININE 0.6 0.6   CALCIUM 7.4* 7.5*   MG 1.4* 1.4*   , CMP   Recent Labs   Lab 09/04/19 0521 09/05/19  0450    140   K 4.3 4.0    106   CO2 27 26   GLU 92 108   BUN 11 11   CREATININE 0.6 0.6   CALCIUM 7.4* 7.5*   PROT 5.7* 5.9*   ALBUMIN 1.1* 1.2*   BILITOT 0.3 0.4   ALKPHOS 211* 210*   AST 33 31   ALT 25 22   ANIONGAP 5* 8   ESTGFRAFRICA >60.0 >60.0   EGFRNONAA >60.0 >60.0   , CBC   Recent Labs   Lab 09/04/19  0521 09/04/19  1826 09/05/19  0450   WBC 11.02 12.09 9.60   HGB 6.8* 9.1* 9.2*   HCT 22.2* 29.3* 30.6*   * 436* 410*    and All labs within the past 24 hours have been reviewed    Radiology: X-Ray: CXR: X-Ray Chest 1 View (CXR):   Results for orders placed or performed during the hospital encounter of 08/28/19   X-Ray Chest 1 View for PICC_Central line    Narrative    EXAMINATION:  XR  CHEST 1 VIEW    CLINICAL HISTORY:  Evaluate PICC line placement;    TECHNIQUE:  Single frontal view of the chest was performed.    COMPARISON:  Chest radiograph 08/12/2019    FINDINGS:  Monitoring leads overlie the chest.  Patient is slightly rotated.    Interval placement of right-sided PICC line with tip slightly obliquely oriented projected over the medial right upper lung zone in the expected location of the proximal right subclavian vein and SVC confluence, which should be advanced at least 2-3 cm.    No large pneumothorax or new focal opacity.  Previous right upper quadrant pigtail drainage catheter is no longer identified.  Otherwise, grossly stable radiographic appearance of the chest.      Impression    As above.      Electronically signed by: Byron Díaz MD  Date:    09/02/2019  Time:    12:20     Pending Diagnostic Studies:     None        Final Active Diagnoses:    Diagnosis Date Noted POA    PRINCIPAL PROBLEM:  Liver cyst [K76.89] 08/28/2019 Yes    Intra-abdominal abscess [K65.1]  Yes    Alteration in skin integrity due to moisture [R23.9] 08/28/2019 Yes      Problems Resolved During this Admission:      Patient Active Problem List   Diagnosis    Generalized abdominal mass    Weight gain    Scrotal edema    Bilateral lower extremity edema    Alteration in skin integrity    Hospital discharge follow-up    Abdominal cyst    Hepatic cyst    Liver cyst    Alteration in skin integrity due to moisture    Intra-abdominal abscess     Discharged Condition: good    Disposition: Home or Self Care    Follow Up:  Follow-up Information     Mark Altamirano MD. Schedule an appointment as soon as possible for a visit in 1 week.    Specialty:  General Surgery  Why:  Post-op Appt w/drain: Office to arrange & notify you.   Contact information:  [address]  [facility] [address]  [phone]             [facility].    Specialty:  Pharmacy Patient Assistance  Why:  Outpatient  services: They will evaluate for possible discounts on prescriptions.   Contact information:  [address]  [facility] [address]  [phone]           [facility].    Specialties:  Pharmacist, DME Provider, IV Infusion  Why:  Infusion/Co name now is [facility]: [facility] Case [facility] dept paid for 2 weeks IV Antibiotics.1.[facility] nurse will teach patients family, deliver infusion & supplies to hospital room. 2. Patient needs to coordinate w/[facility] for weekly lab  Contact information:  [address] [address]  [phone]             Please follow up.    Why:  s & PICC IV line dressing changes-will go to their local infusion suite.   Contact information:  Continued               Patient Instructions:      Lifting restrictions   Order Comments: Do not lift more than 10 lbs for 1 week     Notify your health care provider if you experience any of the following:  temperature >100.4     Notify your health care provider if you experience any of the following:  persistent nausea and vomiting or diarrhea     Notify your health care provider if you experience any of the following:  severe uncontrolled pain     Notify your health care provider if you experience any of the following:  redness, tenderness, or signs of infection (pain, swelling, redness, odor or green/yellow discharge around incision site)     No dressing needed   Order Comments: No dressing needed  Ok to shower with warm soap and water  Do not submerge in bath, pool, lake etc until after your drains are removed     Medications:  Reconciled Home Medications:      Medication List      START taking these medications    oxyCODONE-acetaminophen 5-325 mg per tablet  Commonly known as:  PERCOCET  Take 1 tablet by mouth every 6 (six) hours as needed (pain).          Time spent on the discharge of patient: 2 minutes    Sly Lares PA-C  General Surgery  [facility]

## 2019-09-05 NOTE — SUBJECTIVE & OBJECTIVE
Interval History: No acute events overnight. Pt seen and examined at the bedside. Vital signs stable. No evidence of bleeding. H/h stable after transfusion this morning. Feeling well. Tolerating diet. Awaiting dispo.      Medications:  Continuous Infusions:   sodium chloride 0.9% 50 mL/hr (08/28/19 1712)     Scheduled Meds:   enoxaparin  40 mg Subcutaneous Daily    magnesium oxide  800 mg Oral Once    piperacillin-tazobactam (ZOSYN) IVPB  4.5 g Intravenous Q8H    sodium chloride 0.9%  10 mL Intravenous Q6H     PRN Meds:sodium chloride, sodium chloride 0.9%, oxyCODONE-acetaminophen, sodium chloride 0.9%, Flushing PICC Protocol **AND** sodium chloride 0.9% **AND** sodium chloride 0.9%, sodium chloride 0.9%     Review of patient's allergies indicates:  No Known Allergies  Objective:     Vital Signs (Most Recent):  Temp: 98 °F (36.7 °C) (09/05/19 0614)  Pulse: 84 (09/05/19 0614)  Resp: 18 (09/05/19 0614)  BP: 98/64 (09/05/19 0614)  SpO2: 99 % (09/05/19 0614) Vital Signs (24h Range):  Temp:  [97.4 °F (36.3 °C)-98.8 °F (37.1 °C)] 98 °F (36.7 °C)  Pulse:  [74-96] 84  Resp:  [16-18] 18  SpO2:  [99 %-100 %] 99 %  BP: ()/(57-69) 98/64     Weight: 55.2 kg (121 lb 9.3 oz)  Body mass index is 20.87 kg/m².    Intake/Output - Last 3 Shifts       09/03 0700 - 09/04 0659 09/04 0700 - 09/05 0659 09/05 0700 - 09/06 0659    P.O. 600 240     I.V. (mL/kg) 33 (0.6)      Blood  608.8     IV Piggyback 200      Total Intake(mL/kg) 833 (15.1) 848.8 (15.4)     Urine (mL/kg/hr) 2150 (1.6) 1800 (1.4)     Drains 220 255     Stool 0 0     Total Output 2370 2055     Net -1537 -1206.3            Stool Occurrence 1 x 1 x           Physical Exam   Constitutional: He appears well-developed and well-nourished. No distress.   HENT:   Head: Normocephalic and atraumatic.   Cardiovascular: Normal rate and regular rhythm.   Pulmonary/Chest: Effort normal. No respiratory distress.   Abdominal: Soft. He exhibits no distension. There is no  tenderness.   Soft, NTND  IR drain and judy drain in place. SS. Incisions clean, dry, and intact.        Significant Labs:  CBC:   Recent Labs   Lab 09/05/19  0450   WBC 9.60   RBC 3.56*   HGB 9.2*   HCT 30.6*   *   MCV 86   MCH 25.8*   MCHC 30.1*     CMP:   Recent Labs   Lab 09/05/19  0450      CALCIUM 7.5*   ALBUMIN 1.2*   PROT 5.9*      K 4.0   CO2 26      BUN 11   CREATININE 0.6   ALKPHOS 210*   ALT 22   AST 31   BILITOT 0.4       Significant Diagnostics:  I have reviewed all pertinent imaging results/findings within the past 24 hours.

## 2019-09-05 NOTE — PLAN OF CARE
09/05/19 1238   Post-Acute Status   Post-Acute Authorization HME   Sw received signed letter of agreement for pt's IV AB with [facility] via email from , AZALEA Chavez. Letter of agreement has been sent to [facility]'s intake department for review. Medical team notified.    Michela Ordonez LMSW  [facility]  [phone]

## 2019-09-05 NOTE — PLAN OF CARE
Problem: Adult Inpatient Plan of Care  Goal: Plan of Care Review  Outcome: Ongoing (interventions implemented as appropriate)  Patient AAOx4, VSS. Tolerating diet with no n/v/d. Patient received 2 units today, no s/s of transfusion reaction, repeat cbc 1 hour after last bag transfused sent to lab. No complaints of pain today, pt uses urinal to void spontaneously. No BM today. No falls or injuries this shift. thi drain to bulb suction. Bed locked in lowest position, call light in reach, hourly rounding done for patient safety.

## 2019-09-05 NOTE — ASSESSMENT & PLAN NOTE
54 y.o. 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 S/P 8/31 Laparoscopic cyst fenestration.  Cultures grew morganella morganii and pseudomonas.    Plan:  -Transfused two units yesterday, h/h stable  -continue to regular diet  -bulb drains to suction  -Cont IV abx - Will require 2 week course of Zosyn (PICC Line in place)  -Cont to work on Dispo.  Will require IV abx at discharge  -DVT ppx  -daily labs  -PRN pain meds.

## 2019-09-05 NOTE — PLAN OF CARE
09/05/19 1524   Post-Acute Status   Post-Acute Authorization HME   HME Status Set-up Complete   SW spoke with Rochelle from [employer] ([phone]). SCARLET, JENNIFER and [employer] notified that pt cannot read or write. She stated she feels pt was able to comprehend teaching for IVAB (used picture mat and pt demonstrated back). She reported she spoke with pt's sister Vijaya about calling their on call nurse 24/7 if they need more support. Pt will also be going to their infusion suite in [place] weekly and he will be able to get more nursing support there also. Manolo from [employer] will also come back this evening at 6 PM to do more teaching with family present.    Michela Ordonez, TERE  [facility]  [phone]

## 2019-09-05 NOTE — PLAN OF CARE
Problem: Adult Inpatient Plan of Care  Goal: Plan of Care Review  Plan of care reviewed with pt/verbalized understanding, vss, patient c/o of incisional pain and pain to sacrum due to stage 2 wound, patient uses urinal @ bedside, call-light within reach, will continue to monitor.

## 2019-09-06 ENCOUNTER — PATIENT MESSAGE (OUTPATIENT)
Dept: SURGERY | Facility: CLINIC | Age: 54
End: 2019-09-06

## 2019-09-06 NOTE — NURSING
Patient AAOX4. No complaints of pain. Patient's sister Miriam present at bedside. Went over discharge instructions with patient and sister, both verbalized understanding of same. SHELBI bulbs intact. PICC line intact with positive blood return. Transferred safely off of unit via wheelchair.

## 2019-09-09 ENCOUNTER — PATIENT OUTREACH (OUTPATIENT)
Dept: ADMINISTRATIVE | Facility: CLINIC | Age: 54
End: 2019-09-09

## 2019-09-13 ENCOUNTER — TELEPHONE (OUTPATIENT)
Dept: SURGERY | Facility: CLINIC | Age: 54
End: 2019-09-13

## 2019-09-13 NOTE — TELEPHONE ENCOUNTER
----- Message from Puja Graves sent at 9/13/2019 10:01 AM CDT -----  Contact: Nia with [facility] [phone]  She wants to speak with you about iv antibiotics. Please call

## 2019-10-01 LAB — FUNGUS SPEC CULT: NORMAL

## 2019-10-02 PROBLEM — K76.89 LIVER CYST: Status: RESOLVED | Noted: 2019-08-28 | Resolved: 2019-10-02

## 2019-11-02 LAB
ACID FAST MOD KINY STN SPEC: NORMAL
MYCOBACTERIUM SPEC QL CULT: NORMAL

## 2019-11-11 PROBLEM — Z09 HOSPITAL DISCHARGE FOLLOW-UP: Status: RESOLVED | Noted: 2019-08-12 | Resolved: 2019-11-11

## 2020-02-04 PROBLEM — D50.9 MICROCYTIC ANEMIA: Status: ACTIVE | Noted: 2020-02-04

## 2020-02-17 PROBLEM — M25.511 ACUTE PAIN OF BOTH SHOULDERS: Status: ACTIVE | Noted: 2020-02-17

## 2020-02-17 PROBLEM — Z23 NEED FOR PROPHYLACTIC VACCINATION AGAINST STREPTOCOCCUS PNEUMONIAE (PNEUMOCOCCUS): Status: ACTIVE | Noted: 2020-02-17

## 2020-02-17 PROBLEM — M54.50 LOW BACK PAIN WITHOUT SCIATICA: Status: ACTIVE | Noted: 2020-02-17

## 2020-02-17 PROBLEM — M25.512 ACUTE PAIN OF BOTH SHOULDERS: Status: ACTIVE | Noted: 2020-02-17

## 2020-02-17 PROBLEM — Z23 NEED FOR INFLUENZA VACCINATION: Status: ACTIVE | Noted: 2020-02-17

## 2020-03-12 PROBLEM — D64.9 ANEMIA: Status: ACTIVE | Noted: 2020-03-12

## 2020-08-03 NOTE — SUBJECTIVE & OBJECTIVE
No past medical history on file.    No past surgical history on file.    Review of patient's allergies indicates:  No Known Allergies    Current Facility-Administered Medications on File Prior to Encounter   Medication    [COMPLETED] iohexol (OMNIPAQUE 350) injection 80 mL    iohexol (OMNIPAQUE) oral solution 30 mL     No current outpatient medications on file prior to encounter.     Family History     None        Tobacco Use    Smoking status: Never Smoker    Smokeless tobacco: Never Used   Substance and Sexual Activity    Alcohol use: Not Currently     Comment: quick drinking 6 years ago    Drug use: Never    Sexual activity: Not Currently     Partners: Female     Review of Systems   Constitutional: Negative for chills and fever.   HENT: Negative for trouble swallowing.    Eyes: Negative for visual disturbance.   Respiratory: Negative for shortness of breath.    Cardiovascular: Positive for chest pain and leg swelling. Negative for palpitations.   Gastrointestinal: Positive for abdominal distention and abdominal pain. Negative for blood in stool, constipation, diarrhea, nausea and vomiting.   Genitourinary: Positive for penile swelling and scrotal swelling.        Urinary incontinence   Musculoskeletal: Negative for back pain.        Bilateral LE swelling   Skin: Negative for rash.   Neurological: Negative for dizziness and light-headedness.   Psychiatric/Behavioral: Negative for confusion.     Objective:     Vital Signs (Most Recent):  Temp: 98.5 °F (36.9 °C) (07/26/19 1640)  Pulse: 79 (07/26/19 1846)  Resp: 18 (07/26/19 1846)  BP: 111/79 (07/26/19 1846)  SpO2: 100 % (07/26/19 1846) Vital Signs (24h Range):  Temp:  [98.5 °F (36.9 °C)] 98.5 °F (36.9 °C)  Pulse:  [79-95] 79  Resp:  [18-20] 18  SpO2:  [100 %] 100 %  BP: (106-131)/(70-89) 111/79     Weight: 79.8 kg (176 lb)  Body mass index is 29.29 kg/m².    Physical Exam   Constitutional: He is oriented to person, place, and time. He appears well-developed  and well-nourished. No distress.   HENT:   Head: Normocephalic and atraumatic.   Eyes: EOM are normal. No scleral icterus.   Neck: Normal range of motion. No JVD present.   Cardiovascular: Normal rate and normal heart sounds.   Pulmonary/Chest: Effort normal and breath sounds normal. No respiratory distress.   Abdominal: Bowel sounds are normal. He exhibits distension and mass. There is tenderness. There is no rebound and no guarding.   Enlarged and distended abdomen.    Genitourinary:   Genitourinary Comments: Enlarged scrotum more on the L side than R   Musculoskeletal: Normal range of motion. He exhibits edema.   Bilateral lower extremity edema    Neurological: He is alert and oriented to person, place, and time.   Psychiatric: His behavior is normal.         CRANIAL NERVES     CN III, IV, VI   Extraocular motions are normal.        Significant Labs: All pertinent labs within the past 24 hours have been reviewed.    Significant Imaging: I have reviewed and interpreted all pertinent imaging results/findings within the past 24 hours.   Tranexamic Acid Pregnancy And Lactation Text: It is unknown if this medication is safe during pregnancy or breast feeding.

## 2020-10-24 PROCEDURE — 90471 IMMUNIZATION ADMIN: CPT | Mod: PBBFAC

## 2021-10-09 ENCOUNTER — IMMUNIZATION (OUTPATIENT)
Dept: PRIMARY CARE CLINIC | Facility: CLINIC | Age: 56
End: 2021-10-09
Payer: MEDICAID

## 2021-10-09 PROCEDURE — 90686 IIV4 VACC NO PRSV 0.5 ML IM: CPT | Mod: PBBFAC,PN

## 2021-10-19 ENCOUNTER — HOSPITAL ENCOUNTER (OUTPATIENT)
Dept: RADIOLOGY | Facility: HOSPITAL | Age: 56
Discharge: HOME | End: 2021-10-19
Attending: INTERNAL MEDICINE
Payer: MEDICAID

## 2021-10-19 DIAGNOSIS — R52 PAIN: Primary | ICD-10-CM

## 2021-10-19 DIAGNOSIS — R52 PAIN: ICD-10-CM

## 2021-10-19 PROCEDURE — 72220 X-RAY EXAM SACRUM TAILBONE: CPT | Mod: TC

## 2021-10-19 PROCEDURE — 72100 X-RAY EXAM L-S SPINE 2/3 VWS: CPT | Mod: TC

## 2022-01-02 NOTE — PROGRESS NOTES
[facility] Hospital Medicine  Progress Note    Patient Name: Kishan Sánchez  MRN: [id number]  Patient Class: IP- Inpatient   Admission Date: 7/26/2019  Length of Stay: 3 days  Attending Physician: Janie Hampton MD  Primary Care Provider: Primary Doctor [facility] Medicine Team: [facility] 2 Ayana Julian MD    Subjective:     Principal Problem:Generalized abdominal mass      HPI:  Patient is a 52 yo M with no PMH, has not seen a doctor in his life, presents with an increasing abdomen and scrotum. It is unknown exactly when this all started as the patient is a poor historian (most of the history was obtained via his sister and niece), however, it seems like this has been ongoing for the last few years. He has had progressive distension of his abdomen for the last few years, without any pain. Two weeks ago he started to have some chest pain so he decided to go to [facility], from which he was discharged with a follow up CT Abd appointment. Today he went to his CT Abd appointment, and when he got home, his niece noticed that he had an enlarged scrotum while he was using the bathroom, so insisted that he come to [facility] in [place]. He also has had urinary incontinence starting 2 weeks ago which has progressively gotten worse, now wearing diapers. Also, over these past few years of progressive abdominal distension, he has had progressive leg swelling bilaterally. He endorses weight gain but does not know how much. He denies fevers, chills, chest pain, SOB. He denies any changes in his bowel movements and states that his last one was today which was normal. He denies N/V.    Denies ever seeing a doctor. Denies any PMH and PSH.    He works as a  and lives with his sister. Denies tobacco and IV drug use as well as drugs not prescribed to him. He denies any history of blood transfusions. Endorses remote history of drinking 2 beers daily, but stopped in 1989. His mother passed  away from ovarian cancer and his father from lung cancer. Otherwise, family history is unknown.    CT ABD/Pelvis showing a large 26 x 25 x 20 cm simple appearing cystic mass which cannot be clearly defined as extrahepatic, compressing common bile duct in displacing all intra-abdominal organs without evidence of GI or  obstruction.    In the ED, VSS, WBC 8.4, Alk P 918, , , troponin 0.006.     Overview/Hospital Course:  Patient was admitted to hospital medicine on 07/26 for abdominal distension and pain 2/2 cystic abdominal mass. A CEA was ordered and came back elevated at 6.5 and a  was ordered and was elevated at 57. Lipase was ordered and was mildly elevated at 74. UA showed 2+ bilirubin but was otherwise normal and non-concerning for UTI. On 07/27, General Surgery was consulted and recommended that IR place a drain urgently, due to concerns for LE edema. Neurovascular checks were scheduled q4 and IR was consulted who scheduled him for drainage on Monday, 07/29. A Ca 19-9 was ordered which resulted as being low. On 07/28, IR recommended a MRCP and hepatology consult. MRCP was revealing of cystic mass arising from the liver. Hepatology was consulted. He was placed NPO after midnight and started on a regular diet, as he was not complaining of any difficulties swallowing or any trouble eating/passing food.     Interval History:  NAEO. Complaints of mild abdominal pain. Stating that he is hungry. Denies trouble swallowing or any difficulty eating. Denies bowel/bladder issues. Denies fever/chills/N/V.    Interval History: IR drainage of abscess today; 7L fluid removed. Aspirate studies pending.     Review of Systems   Constitutional: Positive for appetite change and unexpected weight change (weight gain). Negative for chills and fever.   Respiratory: Negative for cough and shortness of breath.    Cardiovascular: Positive for leg swelling. Negative for chest pain.   Gastrointestinal: Positive for  abdominal distention. Negative for abdominal pain, blood in stool, constipation, diarrhea, nausea and vomiting.   Genitourinary: Positive for scrotal swelling.   Musculoskeletal: Negative for arthralgias.   Skin: Negative for pallor.   Neurological: Negative for light-headedness and headaches.   Psychiatric/Behavioral: Negative for behavioral problems and confusion.     Objective:     Vital Signs (Most Recent):  Temp: 97.5 °F (36.4 °C) (07/29/19 1149)  Pulse: 74 (07/29/19 1149)  Resp: 13 (07/29/19 1149)  BP: 101/69 (07/29/19 1149)  SpO2: 99 % (07/29/19 1149) Vital Signs (24h Range):  Temp:  [97.5 °F (36.4 °C)-98 °F (36.7 °C)] 97.5 °F (36.4 °C)  Pulse:  [71-86] 74  Resp:  [13-20] 13  SpO2:  [97 %-100 %] 99 %  BP: (101-150)/() 101/69     Weight: 79.8 kg (175 lb 14.8 oz)  Body mass index is 29.28 kg/m².    Intake/Output Summary (Last 24 hours) at 7/29/2019 1516  Last data filed at 7/29/2019 1102  Gross per 24 hour   Intake --   Output 7200 ml   Net -7200 ml      Physical Exam   Constitutional: He is oriented to person, place, and time. He appears well-developed and well-nourished. No distress.   HENT:   Head: Normocephalic and atraumatic.   Eyes: EOM are normal. No scleral icterus.   Neck: Normal range of motion. No JVD present.   Cardiovascular: Normal rate and normal heart sounds.   Pulmonary/Chest: Effort normal and breath sounds normal. No respiratory distress.   Abdominal: Bowel sounds are normal. He exhibits distension and mass. There is tenderness. There is no rebound and no guarding.   Enlarged and distended abdomen.    Genitourinary:   Genitourinary Comments: Enlarged scrotum more on the L side than R   Musculoskeletal: Normal range of motion. He exhibits edema.   Bilateral lower extremity edema    Neurological: He is alert and oriented to person, place, and time.   Psychiatric: His behavior is normal.     Significant Labs:   CBC:   Recent Labs   Lab 07/28/19  0331 07/29/19  0615   WBC 8.71 8.46   HGB  11.3* 11.1*   HCT 35.4* 35.4*   * 447*     CMP:   Recent Labs   Lab 07/28/19  0331 07/29/19  0615    135*   K 4.0 4.5    103   CO2 28 25   GLU 91 101   BUN 13 12   CREATININE 0.6 0.6   CALCIUM 8.1* 8.1*   PROT 6.1 6.0   ALBUMIN 1.7* 1.7*   BILITOT 2.8* 3.1*   ALKPHOS 918* 885*   * 164*   * 148*   ANIONGAP 6* 7*   EGFRNONAA >60.0 >60.0       Significant Imaging: I have reviewed all pertinent imaging results/findings within the past 24 hours.      Assessment/Plan:      * Generalized abdominal mass  -CT ABD/Pelvis showing a large 26 x 25 x 20 cm simple appearing cystic mass which cannot be clearly defined as extrahepatic, compressing common bile duct in displacing all intra-abdominal organs without evidence of GI or  obstruction  - , CEA, and Lipase mildy elevated at 57, 6.5, and 74 respectively  -  WNL  -Gen Surg consulted on 07/26 and recommended urgent drainage by IR with q4 neurovascular checks  -MRCP performed, cystic mass arising from liver    - IR drainage of abscess 7/29; 7L volume removed  - f/u aspirate studies  - hepatology consulted    Bilateral lower extremity edema  -progressive history of bilateral lower extremity swelling over the last few years with skin tearing present on exam  -likely 2/2 IVC compression via abdominal cystic mass  -IR to drain abdominal mass on Monday 07/29  -baseline doppler with q4 neurovascular checks    Scrotal edema  -increased scrotal swelling over the last 2 weeks  -endorses urinary incontinence over this period as well  -UA with 2+ bilirubin, otherwise normal  -likely 2/2 IVC compression by abdominal mass    Weight gain  -weight gain over the last few years, unsure time-course or amount gained  -CT ABD with large abdominal cystic mass  -IR to drain on Monday 07/29      VTE Risk Mitigation (From admission, onward)        Ordered     IP VTE LOW RISK PATIENT  Once      07/27/19 1012     Place sequential compression device  Until  discontinued      07/26/19 1946                Ayana Julian MD  Department of Hospital Medicine   [facility]   Alert and oriented, no focal deficits, no motor or sensory deficits.

## 2023-05-16 ENCOUNTER — HOSPITAL ENCOUNTER (OUTPATIENT)
Dept: RADIOLOGY | Facility: HOSPITAL | Age: 58
Discharge: HOME | End: 2023-05-16
Attending: PHYSICIAN ASSISTANT
Payer: MEDICARE

## 2023-05-16 DIAGNOSIS — R74.8 ELEVATED ALKALINE PHOSPHATASE LEVEL: ICD-10-CM

## 2023-05-16 PROCEDURE — 76705 ECHO EXAM OF ABDOMEN: CPT | Mod: TC

## 2023-06-16 NOTE — PLAN OF CARE
Assessment/Plan:    Diabetes mellitus due to underlying condition with diabetic nephropathy, with long-term current use of insulin ([facility])    Lab Results   Component Value Date    HGBA1C 9 1 (A) 04/11/2023   · Diagnosed 5 years ago  · Most recently glucose has been difficult to controlled despite adjustment in diet as well as regimen  · BGM at home BID with ranges btw 140-160 fasting glucose, 1-2 hrs after dinner time 180-200 however most recent fasting glucose in the morning over the past week around 90 which are consider ideal and at goal    · Current regimen: Metformin 1000 mg twice daily, Lantus 28 to 30 units at bedtime, Trulicity 1 5 mg recently increased about a month ago  · Most recently patient was prescribed on 05/31 Jardiance 10 mg once a day however patient hesitant to start new medication given side effects of dizziness  · Over the last year patient has lost 30 pounds currently 300 pounds  · Patient have modified diet over the past 6-12 months with good results despite this HbA1c have trended up  · Podiatry has been seen in the past month currently mild risk, continue with Gabapentin 100mg once a day with yearly follow up  · Seen by Ophthalmology in April 2023     · Plan:    · Given Hx of GIOVANNI will check HbA1c concurrently with Fructosamine since glycosylate hemoglobin can be affected although patients Hb has been stable  · Patients home glucose monitoring around 11years old  New one prescribed on this appointment  · At this juncture patient will like to hold off any modifications since home glucose at home have been stable and close to goal  Will like to try glucose checks with new device  · If glucose are not at goal can consider increase Trulicity to 3mg/week  · Holding of restart Jardiance as well until next appointment  · Patient was also referred to Diabetic Education for nutrition and insulin education     · Patient is due dentist appointment   · F/u 4 weeks with labs         Diagnoses and Problem: Adult Inpatient Plan of Care  Goal: Plan of Care Review  Outcome: Ongoing (interventions implemented as appropriate)  Received pt lying in bed AAOx4; NAD noted; VS remained stable throughout shift; afebrile; denied pain this shift; POC reviewed with pt; verbalized understanding; held NPO since MN for OR this AM; assessment per flowhseet; meds administered per MAR; continues with IVF, drain care, monitor output & skin care; safety precautions maintained; 0 falls, injury or acute events this shift; denies needs; WCTM    Pending: AM labs, OR this AM       all orders for this visit:    Diabetes mellitus due to underlying condition with diabetic nephropathy, with long-term current use of insulin ([facility] [address] )    Type 2 diabetes mellitus with diabetic polyneuropathy, with long-term current use of insulin (HCC)    Type 2 diabetes mellitus with diabetic polyneuropathy, without long-term current use of insulin (HCC)  -     Fructosamine; Future  -     HEMOGLOBIN A1C W/ EAG ESTIMATION; Future  -     Ambulatory Referral to Diabetic Education; Future  -     Blood Glucose Monitoring Suppl (OneTouch Verio) w/Device KIT; Use 3 (three) times a day          Subjective:      Patient ID: Radha Kendall is a 57 y o  male  Mr  Jasvir Parker 75-year-old male past medical history of type 2 diabetes, smoldering MM, iron deficiency anemia, obesity, KYLEIGH, asthma, hypertension, hyperlipidemia who comes in as a consult for Diabetic management from PCP Dr Manoj Gregg  Patient was diagnosed approximately 5 years ago his glucose  were fairly controlled thought this years, however most recently have been difficult to control  Patient endorse that over the past year has work on his diet with more consious intake of carbohydrates, sodas, juices  Has managed to loose 30 lbs male with diet  Checks glucose at home twice a day fasting glucose ranging 140s to 160 and around 1 to 2 hours after dinner 180-200  Most recently have noticed fasting glucose less than 90 however denies any symptoms such as diaphoresis or tremulousness  denies any polyuria, polyphagia, polydipsia has not had hospitalizations due to uncontrolled diabetes  Recently seen by podiatry due to lower extremity pain/tenderness as well as pins-and-needles sensation which takes gabapentin  Has also been seen by ophthalmology however no signs of microvascular damage at this juncture          The following portions of the patient's history were reviewed and updated as appropriate: allergies, current medications, past family history, past medical history, "past social history, past surgical history and problem list     Review of Systems   Constitutional: Negative for activity change, appetite change, chills and fever  HENT: Negative for ear pain and sore throat  Eyes: Negative for pain and visual disturbance  Respiratory: Negative for cough and shortness of breath  Cardiovascular: Negative for chest pain, palpitations and leg swelling  Gastrointestinal: Negative for abdominal pain, diarrhea, nausea and vomiting  Endocrine: Negative for polydipsia, polyphagia and polyuria  Genitourinary: Negative for dysuria and hematuria  Musculoskeletal: Negative for arthralgias and back pain  Skin: Negative for color change and rash  Neurological: Negative for seizures and syncope  All other systems reviewed and are negative  Objective:      /90 (BP Location: Left arm, Patient Position: Sitting, Cuff Size: Large)   Pulse 94   Temp (!) 97 3 °F (36 3 °C) (Tympanic)   Ht 5' 11 5\" (1 816 m)   Wt (!) 136 kg (300 lb 12 8 oz)   SpO2 96%   BMI 41 37 kg/m²          Physical Exam  Vitals and nursing note reviewed  Constitutional:       General: He is not in acute distress  Appearance: Normal appearance  He is not ill-appearing  HENT:      Head: Normocephalic and atraumatic  Right Ear: External ear normal       Left Ear: External ear normal       Nose: Nose normal       Mouth/Throat:      Mouth: Mucous membranes are moist    Eyes:      Extraocular Movements: Extraocular movements intact  Conjunctiva/sclera: Conjunctivae normal       Pupils: Pupils are equal, round, and reactive to light  Cardiovascular:      Rate and Rhythm: Normal rate and regular rhythm  Pulses: Normal pulses  Heart sounds: Normal heart sounds  Pulmonary:      Effort: Pulmonary effort is normal       Breath sounds: Normal breath sounds  Abdominal:      General: Bowel sounds are normal  There is no distension  Hernia: A hernia is present        " Comments: Ventral hernia not incarcerated   Musculoskeletal:         General: Normal range of motion  Cervical back: Normal range of motion and neck supple  Right lower leg: No edema  Left lower leg: No edema  Neurological:      General: No focal deficit present  Mental Status: He is alert and oriented to person, place, and time     Psychiatric:         Mood and Affect: Mood normal          Behavior: Behavior normal

## 2023-07-17 NOTE — ASSESSMENT & PLAN NOTE
-increased scrotal swelling over the last 2 weeks  -endorses urinary incontinence over this period as well  -UA with 2+ bilirubin, otherwise normal  -likely 2/2 IVC compression by abdominal mass     Yes

## (undated) DEVICE — DRAPE ABDOMINAL TIBURON 14X11

## (undated) DEVICE — TROCAR ENDOPATH XCEL 5X75MM

## (undated) DEVICE — TRAY MINOR GEN SURG

## (undated) DEVICE — SEALER LIGASURE MARYLAND 37CM

## (undated) DEVICE — EVACUATOR WOUND BULB 100CC

## (undated) DEVICE — TROCAR ENDOPATH XCEL 15MM 10CM

## (undated) DEVICE — BLADE SURG CARBON STEEL SZ11

## (undated) DEVICE — SUT 0 VICRYL / UR6 (J603)

## (undated) DEVICE — BAG TISS RETRV MONARCH 10MM

## (undated) DEVICE — TROCAR ENDOPATH XCEL 11MM 10CM

## (undated) DEVICE — SCISSOR 5MMX35CM DIRECT DRIVE

## (undated) DEVICE — TROCAR KII BLLN 12MM 10CM

## (undated) DEVICE — TROCAR ENDOPATH XCEL 12MM 10CM

## (undated) DEVICE — CLOSURE SKIN STERI STRIP 1/8X3

## (undated) DEVICE — ELECTRODE REM PLYHSV RETURN 9

## (undated) DEVICE — IRRIGATOR ENDOSCOPY DISP.

## (undated) DEVICE — SUT MCRYL PLUS 4-0 PS2 27IN

## (undated) DEVICE — TUBE ASPIRATING LUKI 3-1/4IN

## (undated) DEVICE — SOL NS 1000CC

## (undated) DEVICE — Device

## (undated) DEVICE — NDL HYPO REG 25G X 1 1/2

## (undated) DEVICE — ADHESIVE MASTISOL VIAL 48/BX

## (undated) DEVICE — SOL CLEARIFY VISUALIZATION LAP

## (undated) DEVICE — TROCAR ENDOPATH XCEL 5MM 7.5CM

## (undated) DEVICE — SUT 2/0 30IN SILK BLK BRAI

## (undated) DEVICE — TUBING HF INSUFFLATION W/ FLTR